# Patient Record
Sex: MALE | Race: WHITE | Employment: UNEMPLOYED | URBAN - NONMETROPOLITAN AREA
[De-identification: names, ages, dates, MRNs, and addresses within clinical notes are randomized per-mention and may not be internally consistent; named-entity substitution may affect disease eponyms.]

---

## 2017-02-09 RX ORDER — FLUTICASONE PROPIONATE 50 MCG
2 SPRAY, SUSPENSION (ML) NASAL DAILY
Qty: 16 G | Refills: 1 | Status: SHIPPED | OUTPATIENT
Start: 2017-02-09 | End: 2017-03-02 | Stop reason: SDUPTHER

## 2017-02-17 RX ORDER — IBUPROFEN 800 MG/1
800 TABLET ORAL EVERY 6 HOURS PRN
Qty: 120 TABLET | Refills: 0 | Status: SHIPPED | OUTPATIENT
Start: 2017-02-17 | End: 2017-03-22 | Stop reason: SDUPTHER

## 2017-02-21 ENCOUNTER — OFFICE VISIT (OUTPATIENT)
Dept: PRIMARY CARE CLINIC | Age: 52
End: 2017-02-21
Payer: MEDICAID

## 2017-02-21 VITALS
OXYGEN SATURATION: 97 % | HEIGHT: 71 IN | DIASTOLIC BLOOD PRESSURE: 68 MMHG | WEIGHT: 242 LBS | HEART RATE: 83 BPM | BODY MASS INDEX: 33.88 KG/M2 | SYSTOLIC BLOOD PRESSURE: 118 MMHG | TEMPERATURE: 97.3 F

## 2017-02-21 DIAGNOSIS — L73.9 FOLLICULITIS: Primary | ICD-10-CM

## 2017-02-21 PROCEDURE — 99213 OFFICE O/P EST LOW 20 MIN: CPT | Performed by: NURSE PRACTITIONER

## 2017-02-21 RX ORDER — HYDROCODONE BITARTRATE AND ACETAMINOPHEN 10; 325 MG/1; MG/1
1 TABLET ORAL EVERY 8 HOURS PRN
COMMUNITY
End: 2017-12-06

## 2017-02-21 RX ORDER — CEPHALEXIN 500 MG/1
500 CAPSULE ORAL 3 TIMES DAILY
Qty: 30 CAPSULE | Refills: 0 | Status: SHIPPED | OUTPATIENT
Start: 2017-02-21 | End: 2017-06-13

## 2017-02-21 ASSESSMENT — ENCOUNTER SYMPTOMS
BACK PAIN: 0
NAUSEA: 0
CONSTIPATION: 0
SHORTNESS OF BREATH: 0
COUGH: 0
SINUS PRESSURE: 0
DIARRHEA: 0
ABDOMINAL PAIN: 0
SORE THROAT: 0
CHOKING: 0
TROUBLE SWALLOWING: 0
VOICE CHANGE: 0
VOMITING: 0
ABDOMINAL DISTENTION: 0
WHEEZING: 0
CHEST TIGHTNESS: 0

## 2017-03-01 ENCOUNTER — OFFICE VISIT (OUTPATIENT)
Dept: PRIMARY CARE CLINIC | Age: 52
End: 2017-03-01
Payer: MEDICAID

## 2017-03-01 VITALS
WEIGHT: 232.25 LBS | HEIGHT: 70 IN | HEART RATE: 58 BPM | SYSTOLIC BLOOD PRESSURE: 130 MMHG | DIASTOLIC BLOOD PRESSURE: 82 MMHG | TEMPERATURE: 97.5 F | BODY MASS INDEX: 33.25 KG/M2 | OXYGEN SATURATION: 96 %

## 2017-03-01 DIAGNOSIS — L03.317 CELLULITIS OF BUTTOCK: Primary | ICD-10-CM

## 2017-03-01 PROCEDURE — 99213 OFFICE O/P EST LOW 20 MIN: CPT | Performed by: NURSE PRACTITIONER

## 2017-03-01 ASSESSMENT — ENCOUNTER SYMPTOMS
EYE DISCHARGE: 0
SORE THROAT: 0
COUGH: 0
SHORTNESS OF BREATH: 0
NAUSEA: 0
ABDOMINAL PAIN: 0
TROUBLE SWALLOWING: 0
VOMITING: 0

## 2017-03-02 DIAGNOSIS — L03.221 CELLULITIS OF NECK: ICD-10-CM

## 2017-03-03 ENCOUNTER — TELEPHONE (OUTPATIENT)
Dept: PRIMARY CARE CLINIC | Age: 52
End: 2017-03-03

## 2017-03-03 DIAGNOSIS — L03.221 CELLULITIS OF NECK: ICD-10-CM

## 2017-03-03 LAB
MRSA CULTURE ONLY: ABNORMAL
ORGANISM: ABNORMAL

## 2017-03-03 RX ORDER — FLUTICASONE PROPIONATE 50 MCG
2 SPRAY, SUSPENSION (ML) NASAL DAILY
Qty: 16 G | Refills: 1 | Status: SHIPPED | OUTPATIENT
Start: 2017-03-03 | End: 2017-06-07 | Stop reason: SDUPTHER

## 2017-03-06 RX ORDER — HYDROXYZINE HYDROCHLORIDE 25 MG/1
TABLET, FILM COATED ORAL
Qty: 60 TABLET | Refills: 0 | Status: SHIPPED | OUTPATIENT
Start: 2017-03-06 | End: 2017-12-06

## 2017-03-22 RX ORDER — IBUPROFEN 800 MG/1
TABLET ORAL
Qty: 120 TABLET | Refills: 0 | Status: SHIPPED | OUTPATIENT
Start: 2017-03-22 | End: 2017-04-24 | Stop reason: SDUPTHER

## 2017-04-24 RX ORDER — IBUPROFEN 800 MG/1
800 TABLET ORAL EVERY 6 HOURS PRN
Qty: 120 TABLET | Refills: 0 | Status: SHIPPED | OUTPATIENT
Start: 2017-04-24 | End: 2017-10-19 | Stop reason: SDUPTHER
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
## 2017-05-22 DIAGNOSIS — L30.9 DERMATITIS: ICD-10-CM

## 2017-05-22 RX ORDER — CETIRIZINE HYDROCHLORIDE 10 MG/1
10 TABLET ORAL DAILY
Qty: 30 TABLET | Refills: 5 | Status: SHIPPED | OUTPATIENT
Start: 2017-05-22 | End: 2018-01-05 | Stop reason: SDUPTHER

## 2017-06-08 RX ORDER — FLUTICASONE PROPIONATE 50 MCG
2 SPRAY, SUSPENSION (ML) NASAL DAILY
Qty: 1 BOTTLE | Refills: 5 | Status: SHIPPED | OUTPATIENT
Start: 2017-06-08 | End: 2018-07-30 | Stop reason: SDUPTHER

## 2017-06-13 ENCOUNTER — OFFICE VISIT (OUTPATIENT)
Dept: PRIMARY CARE CLINIC | Age: 52
End: 2017-06-13
Payer: MEDICAID

## 2017-06-13 VITALS
WEIGHT: 229 LBS | OXYGEN SATURATION: 95 % | HEIGHT: 72 IN | SYSTOLIC BLOOD PRESSURE: 138 MMHG | TEMPERATURE: 98.9 F | DIASTOLIC BLOOD PRESSURE: 80 MMHG | HEART RATE: 104 BPM | BODY MASS INDEX: 31.02 KG/M2

## 2017-06-13 DIAGNOSIS — I10 ESSENTIAL HYPERTENSION: Primary | ICD-10-CM

## 2017-06-13 DIAGNOSIS — Z12.11 COLON CANCER SCREENING: ICD-10-CM

## 2017-06-13 DIAGNOSIS — T56.0X1S LEAD-INDUCED GOUT, UNSPECIFIED CHRONICITY, UNSPECIFIED SITE, SEQUELA: ICD-10-CM

## 2017-06-13 DIAGNOSIS — M10.10 LEAD-INDUCED GOUT, UNSPECIFIED CHRONICITY, UNSPECIFIED SITE, SEQUELA: ICD-10-CM

## 2017-06-13 PROCEDURE — 99213 OFFICE O/P EST LOW 20 MIN: CPT | Performed by: NURSE PRACTITIONER

## 2017-06-13 RX ORDER — LISINOPRIL 40 MG/1
40 TABLET ORAL DAILY
Qty: 30 TABLET | Refills: 11 | Status: SHIPPED | OUTPATIENT
Start: 2017-06-13 | End: 2017-12-06

## 2017-06-13 RX ORDER — METOPROLOL TARTRATE 100 MG/1
100 TABLET ORAL 2 TIMES DAILY
Qty: 60 TABLET | Refills: 11 | Status: CANCELLED | OUTPATIENT
Start: 2017-06-13

## 2017-06-13 RX ORDER — ALLOPURINOL 300 MG/1
300 TABLET ORAL DAILY
Qty: 30 TABLET | Refills: 5 | Status: SHIPPED | OUTPATIENT
Start: 2017-06-13 | End: 2018-09-17

## 2017-06-13 RX ORDER — HYDROCHLOROTHIAZIDE 25 MG/1
25 TABLET ORAL DAILY
Qty: 30 TABLET | Refills: 11 | Status: SHIPPED | OUTPATIENT
Start: 2017-06-13 | End: 2017-08-23 | Stop reason: ALTCHOICE

## 2017-06-13 ASSESSMENT — ENCOUNTER SYMPTOMS
COUGH: 0
TROUBLE SWALLOWING: 0
ABDOMINAL PAIN: 0
SHORTNESS OF BREATH: 0
NAUSEA: 0
CONSTIPATION: 0
DIARRHEA: 0
VOMITING: 0
SORE THROAT: 0
RHINORRHEA: 0

## 2017-06-26 ENCOUNTER — TELEPHONE (OUTPATIENT)
Dept: GASTROENTEROLOGY | Age: 52
End: 2017-06-26

## 2017-08-23 ENCOUNTER — OFFICE VISIT (OUTPATIENT)
Dept: PRIMARY CARE CLINIC | Age: 52
End: 2017-08-23
Payer: MEDICAID

## 2017-08-23 ENCOUNTER — TELEPHONE (OUTPATIENT)
Dept: PRIMARY CARE CLINIC | Age: 52
End: 2017-08-23

## 2017-08-23 VITALS
DIASTOLIC BLOOD PRESSURE: 96 MMHG | BODY MASS INDEX: 28.99 KG/M2 | HEART RATE: 85 BPM | SYSTOLIC BLOOD PRESSURE: 144 MMHG | OXYGEN SATURATION: 98 % | HEIGHT: 72 IN | WEIGHT: 214 LBS | TEMPERATURE: 98 F

## 2017-08-23 DIAGNOSIS — I10 ESSENTIAL HYPERTENSION: ICD-10-CM

## 2017-08-23 DIAGNOSIS — N17.9 ACUTE RENAL FAILURE, UNSPECIFIED ACUTE RENAL FAILURE TYPE (HCC): Primary | ICD-10-CM

## 2017-08-23 DIAGNOSIS — F41.9 ANXIETY: ICD-10-CM

## 2017-08-23 DIAGNOSIS — N17.9 ACUTE RENAL FAILURE, UNSPECIFIED ACUTE RENAL FAILURE TYPE (HCC): ICD-10-CM

## 2017-08-23 DIAGNOSIS — T67.1XXD HEAT SYNCOPE, SUBSEQUENT ENCOUNTER: ICD-10-CM

## 2017-08-23 LAB
ALBUMIN SERPL-MCNC: 3.7 G/DL (ref 3.5–5.2)
ALP BLD-CCNC: 67 U/L (ref 40–130)
ALT SERPL-CCNC: 10 U/L (ref 5–41)
ANION GAP SERPL CALCULATED.3IONS-SCNC: 16 MMOL/L (ref 7–19)
AST SERPL-CCNC: 16 U/L (ref 5–40)
BASOPHILS ABSOLUTE: 0 K/UL (ref 0–0.2)
BASOPHILS RELATIVE PERCENT: 0.4 % (ref 0–1)
BILIRUB SERPL-MCNC: 0.4 MG/DL (ref 0.2–1.2)
BUN BLDV-MCNC: 14 MG/DL (ref 6–20)
CALCIUM SERPL-MCNC: 8.8 MG/DL (ref 8.6–10)
CHLORIDE BLD-SCNC: 107 MMOL/L (ref 98–111)
CO2: 21 MMOL/L (ref 22–29)
CREAT SERPL-MCNC: 0.8 MG/DL (ref 0.5–1.2)
EOSINOPHILS ABSOLUTE: 0.2 K/UL (ref 0–0.6)
EOSINOPHILS RELATIVE PERCENT: 2.1 % (ref 0–5)
GFR NON-AFRICAN AMERICAN: >60
GLUCOSE BLD-MCNC: 91 MG/DL (ref 74–109)
HCT VFR BLD CALC: 38.4 % (ref 42–52)
HEMOGLOBIN: 12.7 G/DL (ref 14–18)
LYMPHOCYTES ABSOLUTE: 2.8 K/UL (ref 1.1–4.5)
LYMPHOCYTES RELATIVE PERCENT: 27.9 % (ref 20–40)
MCH RBC QN AUTO: 31.8 PG (ref 27–31)
MCHC RBC AUTO-ENTMCNC: 33.1 G/DL (ref 33–37)
MCV RBC AUTO: 96 FL (ref 80–94)
MONOCYTES ABSOLUTE: 0.7 K/UL (ref 0–0.9)
MONOCYTES RELATIVE PERCENT: 7.2 % (ref 0–10)
NEUTROPHILS ABSOLUTE: 6.2 K/UL (ref 1.5–7.5)
NEUTROPHILS RELATIVE PERCENT: 62 % (ref 50–65)
PDW BLD-RTO: 13.4 % (ref 11.5–14.5)
PLATELET # BLD: 217 K/UL (ref 130–400)
PMV BLD AUTO: 10.4 FL (ref 9.4–12.4)
POTASSIUM SERPL-SCNC: 4 MMOL/L (ref 3.5–5)
RBC # BLD: 4 M/UL (ref 4.7–6.1)
SODIUM BLD-SCNC: 144 MMOL/L (ref 136–145)
TOTAL PROTEIN: 7.1 G/DL (ref 6.6–8.7)
WBC # BLD: 10.1 K/UL (ref 4.8–10.8)

## 2017-08-23 PROCEDURE — 99214 OFFICE O/P EST MOD 30 MIN: CPT | Performed by: NURSE PRACTITIONER

## 2017-08-23 RX ORDER — BUSPIRONE HYDROCHLORIDE 7.5 MG/1
7.5 TABLET ORAL 2 TIMES DAILY
Qty: 60 TABLET | Refills: 1 | Status: SHIPPED | OUTPATIENT
Start: 2017-08-23 | End: 2017-11-14 | Stop reason: SDUPTHER

## 2017-08-23 RX ORDER — AMLODIPINE BESYLATE 5 MG/1
5 TABLET ORAL DAILY
Qty: 30 TABLET | Refills: 11 | Status: SHIPPED | OUTPATIENT
Start: 2017-08-23 | End: 2018-07-30 | Stop reason: SDUPTHER

## 2017-08-23 ASSESSMENT — ENCOUNTER SYMPTOMS
COUGH: 0
RHINORRHEA: 0
CONSTIPATION: 0
SORE THROAT: 0
DIARRHEA: 0
NAUSEA: 0
SHORTNESS OF BREATH: 0
TROUBLE SWALLOWING: 0
ABDOMINAL PAIN: 0
VOMITING: 0

## 2017-08-24 ENCOUNTER — TELEPHONE (OUTPATIENT)
Dept: PRIMARY CARE CLINIC | Age: 52
End: 2017-08-24

## 2017-10-20 RX ORDER — IBUPROFEN 800 MG/1
800 TABLET ORAL EVERY 6 HOURS PRN
Qty: 120 TABLET | Refills: 0 | Status: SHIPPED | OUTPATIENT
Start: 2017-10-20 | End: 2017-11-25 | Stop reason: SDUPTHER

## 2017-10-26 ENCOUNTER — OFFICE VISIT (OUTPATIENT)
Dept: PRIMARY CARE CLINIC | Age: 52
End: 2017-10-26
Payer: MEDICAID

## 2017-10-26 VITALS
DIASTOLIC BLOOD PRESSURE: 88 MMHG | BODY MASS INDEX: 29.32 KG/M2 | TEMPERATURE: 99 F | HEIGHT: 72 IN | WEIGHT: 216.5 LBS | HEART RATE: 83 BPM | OXYGEN SATURATION: 96 % | SYSTOLIC BLOOD PRESSURE: 132 MMHG

## 2017-10-26 DIAGNOSIS — R45.4 IRRITABILITY AND ANGER: Primary | ICD-10-CM

## 2017-10-26 PROCEDURE — 99213 OFFICE O/P EST LOW 20 MIN: CPT | Performed by: NURSE PRACTITIONER

## 2017-10-26 PROCEDURE — G8417 CALC BMI ABV UP PARAM F/U: HCPCS | Performed by: NURSE PRACTITIONER

## 2017-10-26 PROCEDURE — G8427 DOCREV CUR MEDS BY ELIG CLIN: HCPCS | Performed by: NURSE PRACTITIONER

## 2017-10-26 PROCEDURE — 3017F COLORECTAL CA SCREEN DOC REV: CPT | Performed by: NURSE PRACTITIONER

## 2017-10-26 PROCEDURE — G8484 FLU IMMUNIZE NO ADMIN: HCPCS | Performed by: NURSE PRACTITIONER

## 2017-10-26 PROCEDURE — 4004F PT TOBACCO SCREEN RCVD TLK: CPT | Performed by: NURSE PRACTITIONER

## 2017-10-26 RX ORDER — ARIPIPRAZOLE 5 MG/1
5 TABLET ORAL DAILY
Qty: 30 TABLET | Refills: 1 | Status: SHIPPED | OUTPATIENT
Start: 2017-10-26 | End: 2017-12-14 | Stop reason: SDUPTHER

## 2017-10-26 ASSESSMENT — ENCOUNTER SYMPTOMS
CONSTIPATION: 0
SHORTNESS OF BREATH: 0
TROUBLE SWALLOWING: 0
SORE THROAT: 0
COUGH: 0
ABDOMINAL PAIN: 0
VOMITING: 0
NAUSEA: 0
DIARRHEA: 0
RHINORRHEA: 0

## 2017-10-26 NOTE — PROGRESS NOTES
(ADVIL;MOTRIN) 800 MG tablet Take 1 tablet by mouth every 6 hours as needed for Pain Yes CHUCKIE Barnes DO   amLODIPine (NORVASC) 5 MG tablet Take 1 tablet by mouth daily Yes CONSTANTINO Rider   busPIRone (BUSPAR) 7.5 MG tablet Take 1 tablet by mouth 2 times daily Yes CONSTANTINO Rider   lisinopril (PRINIVIL;ZESTRIL) 40 MG tablet Take 1 tablet by mouth daily Yes CONSTANTINO Rider   allopurinol (ZYLOPRIM) 300 MG tablet Take 1 tablet by mouth daily Yes CONSTANTINO Rider   fluticasone (FLONASE) 50 MCG/ACT nasal spray 2 sprays by Nasal route daily Yes CONSTANTINO Louie   cetirizine (ZYRTEC) 10 MG tablet Take 1 tablet by mouth daily Yes CONSTANTINO Spaulding   hydrOXYzine (ATARAX) 25 MG tablet TAKE 1 TO 2 TABLETS BY MOUTH AS NEEDED FOR SLEEP Yes CHUCKIE Barnes DO   HYDROcodone-acetaminophen (NORCO)  MG per tablet Take 1 tablet by mouth every 8 hours as needed for Pain . Yes Historical Provider, MD   FLUoxetine (PROZAC) 40 MG capsule Take 1 capsule by mouth daily Yes CONSTANTINO Rider   baclofen (LIORESAL) 10 MG tablet Take 10 mg by mouth 3 times daily  Yes Historical Provider, MD   gabapentin (NEURONTIN) 600 MG tablet 600 mg 3 times daily  Yes Historical Provider, MD       Allergies: Review of patient's allergies indicates no known allergies. Past Medical History:   Diagnosis Date    Chronic back pain     Chronic neck pain     Gout     Hypertension        No past surgical history on file. Social History   Substance Use Topics    Smoking status: Current Every Day Smoker     Packs/day: 0.50     Types: Cigarettes    Smokeless tobacco: Never Used      Comment: trying to quit    Alcohol use Yes      Comment: 2 days a week        Review of Systems   Constitutional: Negative for activity change, appetite change, fatigue, fever and unexpected weight change.    HENT: Negative for ear pain, rhinorrhea, sore throat and trouble

## 2017-10-26 NOTE — TELEPHONE ENCOUNTER
[facility] called, states pt thought he was getting an increase in his prozac and another rx other than abilify as well today.

## 2017-11-14 DIAGNOSIS — N17.9 ACUTE RENAL FAILURE, UNSPECIFIED ACUTE RENAL FAILURE TYPE (HCC): ICD-10-CM

## 2017-11-14 RX ORDER — BUSPIRONE HYDROCHLORIDE 7.5 MG/1
7.5 TABLET ORAL 2 TIMES DAILY
Qty: 60 TABLET | Refills: 11 | Status: SHIPPED | OUTPATIENT
Start: 2017-11-14 | End: 2018-07-30 | Stop reason: SDUPTHER

## 2017-11-14 NOTE — TELEPHONE ENCOUNTER
Pt last seen 10/26/17      Requested Prescriptions     Signed Prescriptions Disp Refills    busPIRone (BUSPAR) 7.5 MG tablet 60 tablet 11     Sig: Take 1 tablet by mouth 2 times daily     Authorizing Provider: Misbah Rousseua     Ordering User: JAMES SANTIAGO

## 2017-11-27 RX ORDER — IBUPROFEN 800 MG/1
800 TABLET ORAL EVERY 6 HOURS PRN
Qty: 120 TABLET | Refills: 0 | Status: SHIPPED | OUTPATIENT
Start: 2017-11-27 | End: 2018-08-03 | Stop reason: SDUPTHER

## 2017-12-06 ENCOUNTER — OFFICE VISIT (OUTPATIENT)
Dept: PRIMARY CARE CLINIC | Age: 52
End: 2017-12-06
Payer: MEDICAID

## 2017-12-06 VITALS
SYSTOLIC BLOOD PRESSURE: 138 MMHG | OXYGEN SATURATION: 97 % | HEART RATE: 91 BPM | DIASTOLIC BLOOD PRESSURE: 86 MMHG | WEIGHT: 218.75 LBS | HEIGHT: 72 IN | BODY MASS INDEX: 29.63 KG/M2 | TEMPERATURE: 97.5 F

## 2017-12-06 DIAGNOSIS — F41.1 GAD (GENERALIZED ANXIETY DISORDER): Primary | ICD-10-CM

## 2017-12-06 PROCEDURE — G8417 CALC BMI ABV UP PARAM F/U: HCPCS | Performed by: NURSE PRACTITIONER

## 2017-12-06 PROCEDURE — 99213 OFFICE O/P EST LOW 20 MIN: CPT | Performed by: NURSE PRACTITIONER

## 2017-12-06 PROCEDURE — 3017F COLORECTAL CA SCREEN DOC REV: CPT | Performed by: NURSE PRACTITIONER

## 2017-12-06 PROCEDURE — G8427 DOCREV CUR MEDS BY ELIG CLIN: HCPCS | Performed by: NURSE PRACTITIONER

## 2017-12-06 PROCEDURE — G8484 FLU IMMUNIZE NO ADMIN: HCPCS | Performed by: NURSE PRACTITIONER

## 2017-12-06 PROCEDURE — 4004F PT TOBACCO SCREEN RCVD TLK: CPT | Performed by: NURSE PRACTITIONER

## 2017-12-06 RX ORDER — DULOXETIN HYDROCHLORIDE 60 MG/1
60 CAPSULE, DELAYED RELEASE ORAL DAILY
Qty: 30 CAPSULE | Refills: 3 | Status: SHIPPED | OUTPATIENT
Start: 2017-12-06 | End: 2018-07-30 | Stop reason: SDUPTHER

## 2017-12-06 RX ORDER — OXYCODONE AND ACETAMINOPHEN 7.5; 325 MG/1; MG/1
1 TABLET ORAL 3 TIMES DAILY PRN
Refills: 0 | COMMUNITY
Start: 2017-11-22 | End: 2018-11-21

## 2017-12-06 ASSESSMENT — ENCOUNTER SYMPTOMS
COUGH: 0
TROUBLE SWALLOWING: 0
RHINORRHEA: 0
DIARRHEA: 0
CONSTIPATION: 0
ABDOMINAL PAIN: 0
SHORTNESS OF BREATH: 0
VOMITING: 0
SORE THROAT: 0
NAUSEA: 0

## 2017-12-06 NOTE — PROGRESS NOTES
[address]  [place], [address]  Phone [phone]   Fax [phone]      OFFICE VISIT: 2017    Hermilo Angel- : 1965        Reason For Visit:  Madelin Ramey is a 47 y.o. male who is here for Follow-up (medications do not seem to be helping. feels more nervous and anxious) and Hypertension (blood pressure has been running ok. hasnt taken lisinopril in a couple months. 137-70)         HPI    Pt is here for f/u on anxiety  Started abilify last visit. Pt doesn't feel like it is helping. He states he still feels nervous and anxious. Has been to counseling at [facility] - has been 4-5 years ago. Get around people get anxiety  Dr Dusty Chamberlain in the past put me on valium and it really helped. HTN  On lisinopril and norvasc  He states he hasn't been taking his lisinopril for a couple of months. Blood pressure has been running good. height is 6' (1.829 m) and weight is 218 lb 12 oz (99.2 kg). His temporal temperature is 97.5 °F (36.4 °C). His blood pressure is 138/86 and his pulse is 91. His oxygen saturation is 97%. Body mass index is 29.67 kg/m².     Results for orders placed or performed in visit on 17   CBC Auto Differential   Result Value Ref Range    WBC 10.1 4.8 - 10.8 K/uL    RBC 4.00 (L) 4.70 - 6.10 M/uL    Hemoglobin 12.7 (L) 14.0 - 18.0 g/dL    Hematocrit 38.4 (L) 42.0 - 52.0 %    MCV 96.0 (H) 80.0 - 94.0 fL    MCH 31.8 (H) 27.0 - 31.0 pg    MCHC 33.1 33.0 - 37.0 g/dL    RDW 13.4 11.5 - 14.5 %    Platelets 736 726 - 194 K/uL    MPV 10.4 9.4 - 12.4 fL    Neutrophils % 62.0 50.0 - 65.0 %    Lymphocytes % 27.9 20.0 - 40.0 %    Monocytes % 7.2 0.0 - 10.0 %    Eosinophils % 2.1 0.0 - 5.0 %    Basophils % 0.4 0.0 - 1.0 %    Neutrophils # 6.2 1.5 - 7.5 K/uL    Lymphocytes # 2.8 1.1 - 4.5 K/uL    Monocytes # 0.70 0.00 - 0.90 K/uL    Eosinophils # 0.20 0.00 - 0.60 K/uL    Basophils # 0.00 0.00 - 0.20 K/uL   Comprehensive Metabolic Panel   Result Value Ref Range    Sodium 144 136 - 145 nausea and vomiting. Genitourinary: Negative for flank pain. Musculoskeletal: Negative for arthralgias, myalgias, neck pain and neck stiffness. Neurological: Negative for headaches. Psychiatric/Behavioral: Negative for decreased concentration and sleep disturbance. The patient is nervous/anxious. Physical Exam   Constitutional: He is oriented to person, place, and time. He appears well-developed and well-nourished. HENT:   Head: Normocephalic and atraumatic. Eyes: No scleral icterus. Neck: Normal range of motion. Neck supple. No edema present. Cardiovascular: Normal rate, regular rhythm, normal heart sounds and intact distal pulses. No murmur heard. Pulmonary/Chest: Effort normal and breath sounds normal.   Abdominal: Soft. Normal appearance and bowel sounds are normal. He exhibits no distension. There is no hepatosplenomegaly. There is no tenderness. Musculoskeletal: Normal range of motion. He exhibits no edema. Neurological: He is alert and oriented to person, place, and time. Skin: Skin is warm, dry and intact. No rash noted. Psychiatric: He has a normal mood and affect. His speech is normal and behavior is normal. Judgment and thought content normal.   Vitals reviewed. ASSESSMENT      ICD-10-CM ICD-9-CM    1. MAI (generalized anxiety disorder) F41.1 300.02 DULoxetine (CYMBALTA) 60 MG extended release capsule      [facility] - [address]  1. MAI (generalized anxiety disorder)  D/c prozac  Starting on cymbalta  Going to have him see [facility] for counseling. I discussed that with him being on gabapentin and percocet would like to avoid benzos. - DULoxetine (CYMBALTA) 60 MG extended release capsule; Take 1 capsule by mouth daily  Dispense: 30 capsule;  Refill: 3  - [facility] - Chad FLOREZ      Orders Placed This Encounter   Procedures    [facility] - [address]        Return

## 2017-12-14 DIAGNOSIS — R45.4 IRRITABILITY AND ANGER: ICD-10-CM

## 2017-12-14 RX ORDER — ARIPIPRAZOLE 5 MG/1
5 TABLET ORAL DAILY
Qty: 30 TABLET | Refills: 1 | Status: SHIPPED | OUTPATIENT
Start: 2017-12-14 | End: 2018-07-30 | Stop reason: SDUPTHER
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
## 2018-01-05 DIAGNOSIS — L30.9 DERMATITIS: ICD-10-CM

## 2018-01-05 RX ORDER — CETIRIZINE HYDROCHLORIDE 10 MG/1
10 TABLET ORAL DAILY
Qty: 30 TABLET | Refills: 11 | Status: SHIPPED | OUTPATIENT
Start: 2018-01-05 | End: 2018-07-30 | Stop reason: SDUPTHER

## 2018-07-30 ENCOUNTER — OFFICE VISIT (OUTPATIENT)
Dept: PRIMARY CARE CLINIC | Age: 53
End: 2018-07-30
Payer: MEDICAID

## 2018-07-30 VITALS
OXYGEN SATURATION: 96 % | HEART RATE: 94 BPM | WEIGHT: 197 LBS | SYSTOLIC BLOOD PRESSURE: 154 MMHG | BODY MASS INDEX: 26.68 KG/M2 | TEMPERATURE: 97.6 F | DIASTOLIC BLOOD PRESSURE: 96 MMHG | HEIGHT: 72 IN

## 2018-07-30 DIAGNOSIS — B35.4 TINEA CORPORIS: ICD-10-CM

## 2018-07-30 DIAGNOSIS — F33.1 MODERATE EPISODE OF RECURRENT MAJOR DEPRESSIVE DISORDER (HCC): Primary | ICD-10-CM

## 2018-07-30 DIAGNOSIS — Z13.31 POSITIVE DEPRESSION SCREENING: ICD-10-CM

## 2018-07-30 DIAGNOSIS — J30.9 ALLERGIC RHINITIS, UNSPECIFIED CHRONICITY, UNSPECIFIED SEASONALITY, UNSPECIFIED TRIGGER: ICD-10-CM

## 2018-07-30 DIAGNOSIS — Z00.00 ROUTINE PHYSICAL EXAMINATION: ICD-10-CM

## 2018-07-30 DIAGNOSIS — I10 ESSENTIAL HYPERTENSION: ICD-10-CM

## 2018-07-30 DIAGNOSIS — F41.1 GAD (GENERALIZED ANXIETY DISORDER): ICD-10-CM

## 2018-07-30 PROCEDURE — G0444 DEPRESSION SCREEN ANNUAL: HCPCS | Performed by: NURSE PRACTITIONER

## 2018-07-30 PROCEDURE — 3017F COLORECTAL CA SCREEN DOC REV: CPT | Performed by: NURSE PRACTITIONER

## 2018-07-30 PROCEDURE — 4004F PT TOBACCO SCREEN RCVD TLK: CPT | Performed by: NURSE PRACTITIONER

## 2018-07-30 PROCEDURE — 99214 OFFICE O/P EST MOD 30 MIN: CPT | Performed by: NURSE PRACTITIONER

## 2018-07-30 PROCEDURE — G8431 POS CLIN DEPRES SCRN F/U DOC: HCPCS | Performed by: NURSE PRACTITIONER

## 2018-07-30 PROCEDURE — G8417 CALC BMI ABV UP PARAM F/U: HCPCS | Performed by: NURSE PRACTITIONER

## 2018-07-30 PROCEDURE — G8427 DOCREV CUR MEDS BY ELIG CLIN: HCPCS | Performed by: NURSE PRACTITIONER

## 2018-07-30 RX ORDER — DULOXETIN HYDROCHLORIDE 60 MG/1
60 CAPSULE, DELAYED RELEASE ORAL DAILY
Qty: 30 CAPSULE | Refills: 11 | Status: SHIPPED | OUTPATIENT
Start: 2018-07-30 | End: 2019-12-13

## 2018-07-30 RX ORDER — LISINOPRIL 40 MG/1
40 TABLET ORAL DAILY
Qty: 30 TABLET | Refills: 11 | Status: SHIPPED | OUTPATIENT
Start: 2018-07-30 | End: 2019-09-20 | Stop reason: SDUPTHER

## 2018-07-30 RX ORDER — KETOCONAZOLE 20 MG/G
CREAM TOPICAL 2 TIMES DAILY
Qty: 60 G | Refills: 0 | Status: SHIPPED | OUTPATIENT
Start: 2018-07-30 | End: 2018-11-21

## 2018-07-30 RX ORDER — ARIPIPRAZOLE 5 MG/1
5 TABLET ORAL DAILY
Qty: 30 TABLET | Refills: 11 | Status: SHIPPED | OUTPATIENT
Start: 2018-07-30 | End: 2019-12-13

## 2018-07-30 RX ORDER — LISINOPRIL 40 MG/1
40 TABLET ORAL DAILY
COMMUNITY
End: 2018-07-30 | Stop reason: SDUPTHER

## 2018-07-30 RX ORDER — CETIRIZINE HYDROCHLORIDE 10 MG/1
10 TABLET ORAL DAILY
Qty: 30 TABLET | Refills: 11 | Status: SHIPPED | OUTPATIENT
Start: 2018-07-30 | End: 2018-08-06 | Stop reason: ALTCHOICE

## 2018-07-30 RX ORDER — FLUTICASONE PROPIONATE 50 MCG
2 SPRAY, SUSPENSION (ML) NASAL DAILY
Qty: 1 BOTTLE | Refills: 5 | Status: SHIPPED | OUTPATIENT
Start: 2018-07-30 | End: 2019-12-13

## 2018-07-30 RX ORDER — BUSPIRONE HYDROCHLORIDE 7.5 MG/1
7.5 TABLET ORAL 2 TIMES DAILY
Qty: 60 TABLET | Refills: 11 | Status: SHIPPED | OUTPATIENT
Start: 2018-07-30 | End: 2019-03-27 | Stop reason: SDUPTHER

## 2018-07-30 RX ORDER — AMLODIPINE BESYLATE 5 MG/1
5 TABLET ORAL DAILY
Qty: 30 TABLET | Refills: 11 | Status: SHIPPED | OUTPATIENT
Start: 2018-07-30 | End: 2019-09-20 | Stop reason: SDUPTHER

## 2018-07-30 ASSESSMENT — PATIENT HEALTH QUESTIONNAIRE - PHQ9
3. TROUBLE FALLING OR STAYING ASLEEP: 2
SUM OF ALL RESPONSES TO PHQ9 QUESTIONS 1 & 2: 4
9. THOUGHTS THAT YOU WOULD BE BETTER OFF DEAD, OR OF HURTING YOURSELF: 0
5. POOR APPETITE OR OVEREATING: 0
4. FEELING TIRED OR HAVING LITTLE ENERGY: 3
8. MOVING OR SPEAKING SO SLOWLY THAT OTHER PEOPLE COULD HAVE NOTICED. OR THE OPPOSITE, BEING SO FIGETY OR RESTLESS THAT YOU HAVE BEEN MOVING AROUND A LOT MORE THAN USUAL: 2
2. FEELING DOWN, DEPRESSED OR HOPELESS: 3
6. FEELING BAD ABOUT YOURSELF - OR THAT YOU ARE A FAILURE OR HAVE LET YOURSELF OR YOUR FAMILY DOWN: 3
SUM OF ALL RESPONSES TO PHQ QUESTIONS 1-9: 17
10. IF YOU CHECKED OFF ANY PROBLEMS, HOW DIFFICULT HAVE THESE PROBLEMS MADE IT FOR YOU TO DO YOUR WORK, TAKE CARE OF THINGS AT HOME, OR GET ALONG WITH OTHER PEOPLE: 2
1. LITTLE INTEREST OR PLEASURE IN DOING THINGS: 1
7. TROUBLE CONCENTRATING ON THINGS, SUCH AS READING THE NEWSPAPER OR WATCHING TELEVISION: 3

## 2018-07-30 ASSESSMENT — ENCOUNTER SYMPTOMS
VOMITING: 0
SHORTNESS OF BREATH: 0
COUGH: 0
ABDOMINAL PAIN: 0
NAUSEA: 0
DIARRHEA: 0
CONSTIPATION: 0
RHINORRHEA: 0
TROUBLE SWALLOWING: 0
SORE THROAT: 0

## 2018-07-30 NOTE — PROGRESS NOTES
11    amLODIPine (NORVASC) 5 MG tablet Take 1 tablet by mouth daily 30 tablet 11    fluticasone (FLONASE) 50 MCG/ACT nasal spray 2 sprays by Nasal route daily 1 Bottle 5    ketoconazole (NIZORAL) 2 % cream Apply topically 2 times daily 60 g 0    allopurinol (ZYLOPRIM) 300 MG tablet Take 1 tablet by mouth daily 30 tablet 5    oxyCODONE-acetaminophen (PERCOCET) 7.5-325 MG per tablet Take 1 tablet by mouth 3 times daily as needed . 0    ibuprofen (ADVIL;MOTRIN) 800 MG tablet Take 1 tablet by mouth every 6 hours as needed for Pain 120 tablet 0    baclofen (LIORESAL) 10 MG tablet Take 10 mg by mouth 3 times daily       gabapentin (NEURONTIN) 600 MG tablet 600 mg 3 times daily        No current facility-administered medications for this visit. No Known Allergies    Health Maintenance   Topic Date Due    Potassium monitoring  1965    Creatinine monitoring  1965    HIV screen  08/17/1981    DTaP/Tdap/Td vaccine (1 - Tdap) 08/17/1985    Pneumococcal med risk (1 of 1 - PPSV23) 08/17/1985    Shingles Vaccine (1 of 2 - 2 Dose Series) 08/17/2016    Colon cancer screen colonoscopy  08/17/2016    Flu vaccine (1) 09/01/2018    Lipid screen  12/20/2021        Subjective:      Review of Systems   Constitutional: Negative for activity change, appetite change, fatigue, fever and unexpected weight change. HENT: Negative for ear pain, rhinorrhea, sore throat and trouble swallowing. Eyes: Negative for visual disturbance. Respiratory: Negative for cough and shortness of breath. Cardiovascular: Negative for chest pain, palpitations and leg swelling. Gastrointestinal: Negative for abdominal pain, constipation, diarrhea, nausea and vomiting. Genitourinary: Negative for flank pain. Musculoskeletal: Negative for arthralgias, myalgias, neck pain and neck stiffness. Neurological: Negative for headaches. Psychiatric/Behavioral: Positive for decreased concentration and dysphoric mood.  Negative for sleep disturbance and suicidal ideas. The patient is nervous/anxious. Objective:     Physical Exam   Constitutional: He is oriented to person, place, and time. He appears well-developed and well-nourished. HENT:   Head: Normocephalic and atraumatic. Right Ear: External ear normal.   Left Ear: External ear normal.   Nose: Nose normal.   Mouth/Throat: Oropharynx is clear and moist.   Eyes: Conjunctivae are normal. Pupils are equal, round, and reactive to light. No scleral icterus. Neck: Normal range of motion. Neck supple. No edema present. Cardiovascular: Normal rate, regular rhythm, normal heart sounds and intact distal pulses. No murmur heard. Pulmonary/Chest: Effort normal and breath sounds normal.   Abdominal: Soft. Normal appearance and bowel sounds are normal. He exhibits no distension. There is no hepatosplenomegaly. There is no tenderness. Musculoskeletal: Normal range of motion. He exhibits no edema. Hands:       Legs:  Neurological: He is alert and oriented to person, place, and time. Skin: Skin is warm, dry and intact. No rash noted. Psychiatric: He has a normal mood and affect. His speech is normal and behavior is normal. Judgment and thought content normal.   Vitals reviewed. BP (!) 154/96   Pulse 94   Temp 97.6 °F (36.4 °C) (Temporal)   Ht 6' (1.829 m)   Wt 197 lb (89.4 kg)   SpO2 96%   BMI 26.72 kg/m²     Assessment:        ICD-10-CM ICD-9-CM    1. Moderate episode of recurrent major depressive disorder (HCC) F33.1 296.32 DULoxetine (CYMBALTA) 60 MG extended release capsule      busPIRone (BUSPAR) 7.5 MG tablet   2. MAI (generalized anxiety disorder) F41.1 300.02 DULoxetine (CYMBALTA) 60 MG extended release capsule      busPIRone (BUSPAR) 7.5 MG tablet   3.  Essential hypertension I10 401.9 lisinopril (PRINIVIL;ZESTRIL) 40 MG tablet      amLODIPine (NORVASC) 5 MG tablet      CBC Auto Differential      Comprehensive Metabolic Panel      Lipid Panel Microalbumin / Creatinine Urine Ratio    Starting back on norvasc. Patient is asked to monitor BP at home or work, several times per month and return with written values at next office visit. 4. Tinea corporis B35.4 110.5 ketoconazole (NIZORAL) 2 % cream   5. Allergic rhinitis, unspecified chronicity, unspecified seasonality, unspecified trigger J30.9 477.9 cetirizine (ZYRTEC) 10 MG tablet      fluticasone (FLONASE) 50 MCG/ACT nasal spray   6. Routine physical examination Z00.00 V70.0 CBC Auto Differential      Comprehensive Metabolic Panel      Lipid Panel      TSH without Reflex      T4, Free      Microalbumin / Creatinine Urine Ratio   7. Positive depression screening Z13.89 796.4 Positive Screen for Clinical Depression with a Documented Follow-up Plan        Plan:      Return in about 6 weeks (around 9/10/2018) for mood follow up, high blood pressure.     Orders Placed This Encounter   Procedures    CBC Auto Differential     Standing Status:   Future     Standing Expiration Date:   7/31/2019    Comprehensive Metabolic Panel     Standing Status:   Future     Standing Expiration Date:   7/30/2019    Lipid Panel     Standing Status:   Future     Standing Expiration Date:   7/31/2019     Order Specific Question:   Is Patient Fasting?/# of Hours     Answer:   12 HOURS    TSH without Reflex     Standing Status:   Future     Standing Expiration Date:   7/31/2019    T4, Free     Standing Status:   Future     Standing Expiration Date:   7/31/2019    Microalbumin / Creatinine Urine Ratio     Standing Status:   Future     Standing Expiration Date:   7/31/2019    Positive Screen for Clinical Depression with a Documented Follow-up Plan      Orders Placed This Encounter   Medications    lisinopril (PRINIVIL;ZESTRIL) 40 MG tablet     Sig: Take 1 tablet by mouth daily     Dispense:  30 tablet     Refill:  11    cetirizine (ZYRTEC) 10 MG tablet     Sig: Take 1 tablet by mouth daily     Dispense:  30

## 2018-08-03 RX ORDER — IBUPROFEN 800 MG/1
800 TABLET ORAL EVERY 6 HOURS PRN
Qty: 120 TABLET | Refills: 0 | Status: SHIPPED | OUTPATIENT
Start: 2018-08-03 | End: 2018-09-17 | Stop reason: SDUPTHER

## 2018-08-03 NOTE — TELEPHONE ENCOUNTER
Pt seen 7/30/18 with a follow up on 9/10/18.       Requested Prescriptions     Pending Prescriptions Disp Refills    ibuprofen (ADVIL;MOTRIN) 800 MG tablet 120 tablet 0     Sig: Take 1 tablet by mouth every 6 hours as needed for Pain

## 2018-08-06 RX ORDER — LORATADINE 10 MG/1
10 TABLET ORAL DAILY
Qty: 30 TABLET | Refills: 11 | Status: SHIPPED | OUTPATIENT
Start: 2018-08-06 | End: 2019-12-13

## 2018-09-17 ENCOUNTER — OFFICE VISIT (OUTPATIENT)
Dept: PRIMARY CARE CLINIC | Age: 53
End: 2018-09-17
Payer: MEDICAID

## 2018-09-17 VITALS
HEART RATE: 94 BPM | WEIGHT: 185 LBS | HEIGHT: 72 IN | OXYGEN SATURATION: 94 % | DIASTOLIC BLOOD PRESSURE: 75 MMHG | SYSTOLIC BLOOD PRESSURE: 116 MMHG | BODY MASS INDEX: 25.06 KG/M2 | TEMPERATURE: 98.6 F

## 2018-09-17 DIAGNOSIS — Z00.00 ROUTINE PHYSICAL EXAMINATION: Primary | ICD-10-CM

## 2018-09-17 DIAGNOSIS — Z11.4 ENCOUNTER FOR SCREENING FOR HIV: ICD-10-CM

## 2018-09-17 DIAGNOSIS — M54.42 CHRONIC LEFT-SIDED LOW BACK PAIN WITH LEFT-SIDED SCIATICA: ICD-10-CM

## 2018-09-17 DIAGNOSIS — F41.1 GAD (GENERALIZED ANXIETY DISORDER): ICD-10-CM

## 2018-09-17 DIAGNOSIS — G89.29 CHRONIC LEFT-SIDED LOW BACK PAIN WITH LEFT-SIDED SCIATICA: ICD-10-CM

## 2018-09-17 DIAGNOSIS — Z12.11 SCREENING FOR COLON CANCER: ICD-10-CM

## 2018-09-17 PROCEDURE — 99396 PREV VISIT EST AGE 40-64: CPT | Performed by: NURSE PRACTITIONER

## 2018-09-17 RX ORDER — IBUPROFEN 800 MG/1
800 TABLET ORAL EVERY 6 HOURS PRN
Qty: 120 TABLET | Refills: 0 | Status: SHIPPED | OUTPATIENT
Start: 2018-09-17 | End: 2018-10-28 | Stop reason: SDUPTHER

## 2018-09-17 RX ORDER — M-VIT,TX,IRON,MINS/CALC/FOLIC 27MG-0.4MG
1 TABLET ORAL DAILY
COMMUNITY
End: 2019-12-13

## 2018-09-17 RX ORDER — CHLORAL HYDRATE 500 MG
1000 CAPSULE ORAL DAILY
COMMUNITY
End: 2019-12-13

## 2018-09-17 ASSESSMENT — ENCOUNTER SYMPTOMS
NAUSEA: 0
SHORTNESS OF BREATH: 0
VOMITING: 0
RHINORRHEA: 0
DIARRHEA: 0
COUGH: 0
SORE THROAT: 0
ABDOMINAL PAIN: 0
CONSTIPATION: 0
TROUBLE SWALLOWING: 0

## 2018-09-17 NOTE — PROGRESS NOTES
normal and behavior is normal. Judgment and thought content normal.   Vitals reviewed. /75 (Site: Left Upper Arm, Position: Sitting, Cuff Size: Large Adult)   Pulse 94   Temp 98.6 °F (37 °C) (Temporal)   Ht 5' 11.5\" (1.816 m)   Wt 185 lb (83.9 kg)   SpO2 94%   BMI 25.44 kg/m²     Assessment:        ICD-10-CM ICD-9-CM    1. Routine physical examination Z00.00 V70.0 PSA Screening      HIV Rapid 1&2      [facility]- Dwayne Elena MD   2. Encounter for screening for HIV Z11.4 V73.89 HIV Rapid 1&2   3. Chronic left-sided low back pain with left-sided sciatica M54.42 724.2 ibuprofen (ADVIL;MOTRIN) 800 MG tablet    G89.29 724.3      338.29    4. Screening for colon cancer Z12.11 V76.51 [facility]- Dwayne Elena MD   5. MAI (generalized anxiety disorder) F41.1 300.02 I explained to patient that I would not give him any benzos, if he needs them he will have to see psychiatry. Advised for him to see [address] [place] [address]. Plan:   Advised to check with health department or pharmacy for flu, pnuemonia, and shingles vaccines    Return in about 6 months (around 3/17/2019). Orders Placed This Encounter   Procedures    PSA Screening     Standing Status:   Future     Standing Expiration Date:   9/17/2019    HIV Rapid 1&2     Standing Status:   Future     Standing Expiration Date:   9/17/2019     Order Specific Question:   Specify Date & Time of Incident     Answer:   .    [facility]- Dwayne Eelna MD     Referral Priority:   Routine     Referral Type:   Eval and Treat     Referral Reason:   Specialty Services Required     Referred to Provider:   Sandra Galindo MD     Requested Specialty:   Gastroenterology     Number of Visits Requested:   1     Orders Placed This Encounter   Medications    ibuprofen (ADVIL;MOTRIN) 800 MG tablet     Sig: Take 1 tablet by mouth every 6 hours as needed for Pain     Dispense:  120 tablet     Refill:  0         Discussed use, benefit, and side effects of prescribed medications. All patient questions answered. Pt voiced understanding. Reviewed health maintenance. .  Patient agreed with treatment plan. Follow up as directed. Patient Instructions     Patient Education     Check with health department or pharmacy for flu, pnuemonia, and shingles vaccines    Fasting labs     Well Visit, Men 48 to 72: Care Instructions  Your Care Instructions    Physical exams can help you stay healthy. Your doctor has checked your overall health and may have suggested ways to take good care of yourself. He or she also may have recommended tests. At home, you can help prevent illness with healthy eating, regular exercise, and other steps. Follow-up care is a key part of your treatment and safety. Be sure to make and go to all appointments, and call your doctor if you are having problems. It's also a good idea to know your test results and keep a list of the medicines you take. How can you care for yourself at home? · Reach and stay at a healthy weight. This will lower your risk for many problems, such as obesity, diabetes, heart disease, and high blood pressure. · Get at least 30 minutes of exercise on most days of the week. Walking is a good choice. You also may want to do other activities, such as running, swimming, cycling, or playing tennis or team sports. · Do not smoke. Smoking can make health problems worse. If you need help quitting, talk to your doctor about stop-smoking programs and medicines. These can increase your chances of quitting for good. · Protect your skin from too much sun. When you're outdoors from 10 a.m. to 4 p.m., stay in the shade or cover up with clothing and a hat with a wide brim. Wear sunglasses that block UV rays. Even when it's cloudy, put broad-spectrum sunscreen (SPF 30 or higher) on any exposed skin. · See a dentist one or two times a year for checkups and to have your teeth cleaned. · Wear a seat belt in the car.   · Limit alcohol to 2 drinks a day. Too much alcohol can cause health problems. Follow your doctor's advice about when to have certain tests. These tests can spot problems early. · Cholesterol. Your doctor will tell you how often to have this done based on your overall health and other things that can increase your risk for heart attack and stroke. · Blood pressure. Have your blood pressure checked during a routine doctor visit. Your doctor will tell you how often to check your blood pressure based on your age, your blood pressure results, and other factors. · Prostate exam. Talk to your doctor about whether you should have a blood test (called a PSA test) for prostate cancer. Experts disagree on whether men should have this test. Some experts recommend that you discuss the benefits and risks of the test with your doctor. · Diabetes. Ask your doctor whether you should have tests for diabetes. · Vision. Some experts recommend that you have yearly exams for glaucoma and other age-related eye problems starting at age 48. · Hearing. Tell your doctor if you notice any change in your hearing. You can have tests to find out how well you hear. · Colon cancer. You should begin tests for colon cancer at age 48. You may have one of several tests. Your doctor will tell you how often to have tests based on your age and risk. Risks include whether you already had a precancerous polyp removed from your colon or whether your parent, brother, sister, or child has had colon cancer. · Heart attack and stroke risk. At least every 4 to 6 years, you should have your risk for heart attack and stroke assessed. Your doctor uses factors such as your age, blood pressure, cholesterol, and whether you smoke or have diabetes to show what your risk for a heart attack or stroke is over the next 10 years. · Abdominal aortic aneurysm. Ask your doctor whether you should have a test to check for an aneurysm.  You may need a test if you ever smoked or

## 2018-09-17 NOTE — PATIENT INSTRUCTIONS
often to have this done based on your overall health and other things that can increase your risk for heart attack and stroke. · Blood pressure. Have your blood pressure checked during a routine doctor visit. Your doctor will tell you how often to check your blood pressure based on your age, your blood pressure results, and other factors. · Prostate exam. Talk to your doctor about whether you should have a blood test (called a PSA test) for prostate cancer. Experts disagree on whether men should have this test. Some experts recommend that you discuss the benefits and risks of the test with your doctor. · Diabetes. Ask your doctor whether you should have tests for diabetes. · Vision. Some experts recommend that you have yearly exams for glaucoma and other age-related eye problems starting at age 48. · Hearing. Tell your doctor if you notice any change in your hearing. You can have tests to find out how well you hear. · Colon cancer. You should begin tests for colon cancer at age 48. You may have one of several tests. Your doctor will tell you how often to have tests based on your age and risk. Risks include whether you already had a precancerous polyp removed from your colon or whether your parent, brother, sister, or child has had colon cancer. · Heart attack and stroke risk. At least every 4 to 6 years, you should have your risk for heart attack and stroke assessed. Your doctor uses factors such as your age, blood pressure, cholesterol, and whether you smoke or have diabetes to show what your risk for a heart attack or stroke is over the next 10 years. · Abdominal aortic aneurysm. Ask your doctor whether you should have a test to check for an aneurysm. You may need a test if you ever smoked or if your parent, brother, sister, or child has had an aneurysm. When should you call for help?   Watch closely for changes in your health, and be sure to contact your doctor if you have any problems or symptoms that

## 2018-10-18 ENCOUNTER — TELEPHONE (OUTPATIENT)
Dept: GASTROENTEROLOGY | Age: 53
End: 2018-10-18

## 2018-10-28 DIAGNOSIS — G89.29 CHRONIC LEFT-SIDED LOW BACK PAIN WITH LEFT-SIDED SCIATICA: ICD-10-CM

## 2018-10-28 DIAGNOSIS — M54.42 CHRONIC LEFT-SIDED LOW BACK PAIN WITH LEFT-SIDED SCIATICA: ICD-10-CM

## 2018-10-29 RX ORDER — IBUPROFEN 800 MG/1
800 TABLET ORAL EVERY 6 HOURS PRN
Qty: 120 TABLET | Refills: 3 | Status: SHIPPED | OUTPATIENT
Start: 2018-10-29 | End: 2019-03-02 | Stop reason: SDUPTHER

## 2018-11-21 ENCOUNTER — OFFICE VISIT (OUTPATIENT)
Dept: PRIMARY CARE CLINIC | Age: 53
End: 2018-11-21
Payer: MEDICAID

## 2018-11-21 VITALS
SYSTOLIC BLOOD PRESSURE: 125 MMHG | OXYGEN SATURATION: 97 % | BODY MASS INDEX: 27.09 KG/M2 | DIASTOLIC BLOOD PRESSURE: 83 MMHG | HEART RATE: 94 BPM | TEMPERATURE: 97.8 F | HEIGHT: 72 IN | WEIGHT: 200 LBS

## 2018-11-21 DIAGNOSIS — M54.2 CHRONIC NECK PAIN: ICD-10-CM

## 2018-11-21 DIAGNOSIS — M51.26 LUMBAR DISC HERNIATION: Primary | ICD-10-CM

## 2018-11-21 DIAGNOSIS — G89.29 CHRONIC NECK PAIN: ICD-10-CM

## 2018-11-21 DIAGNOSIS — M50.20 CERVICAL DISC HERNIATION: ICD-10-CM

## 2018-11-21 DIAGNOSIS — M54.42 CHRONIC LEFT-SIDED LOW BACK PAIN WITH LEFT-SIDED SCIATICA: ICD-10-CM

## 2018-11-21 DIAGNOSIS — G89.29 CHRONIC LEFT-SIDED LOW BACK PAIN WITH LEFT-SIDED SCIATICA: ICD-10-CM

## 2018-11-21 PROCEDURE — G8484 FLU IMMUNIZE NO ADMIN: HCPCS | Performed by: NURSE PRACTITIONER

## 2018-11-21 PROCEDURE — G8417 CALC BMI ABV UP PARAM F/U: HCPCS | Performed by: NURSE PRACTITIONER

## 2018-11-21 PROCEDURE — 3017F COLORECTAL CA SCREEN DOC REV: CPT | Performed by: NURSE PRACTITIONER

## 2018-11-21 PROCEDURE — 99213 OFFICE O/P EST LOW 20 MIN: CPT | Performed by: NURSE PRACTITIONER

## 2018-11-21 PROCEDURE — G8427 DOCREV CUR MEDS BY ELIG CLIN: HCPCS | Performed by: NURSE PRACTITIONER

## 2018-11-21 PROCEDURE — 4004F PT TOBACCO SCREEN RCVD TLK: CPT | Performed by: NURSE PRACTITIONER

## 2018-11-21 ASSESSMENT — ENCOUNTER SYMPTOMS
DIARRHEA: 0
BACK PAIN: 1
SORE THROAT: 0
RHINORRHEA: 0
CONSTIPATION: 0
TROUBLE SWALLOWING: 0
COUGH: 0
VOMITING: 0
ABDOMINAL PAIN: 0
SHORTNESS OF BREATH: 0
NAUSEA: 0

## 2018-12-04 ENCOUNTER — TELEPHONE (OUTPATIENT)
Dept: PRIMARY CARE CLINIC | Age: 53
End: 2018-12-04

## 2018-12-13 ENCOUNTER — HOSPITAL ENCOUNTER (OUTPATIENT)
Dept: PAIN MANAGEMENT | Age: 53
Discharge: HOME | End: 2018-12-13
Payer: MEDICAID

## 2018-12-13 ENCOUNTER — HOSPITAL ENCOUNTER (OUTPATIENT)
Dept: GENERAL RADIOLOGY | Age: 53
Discharge: HOME | End: 2018-12-13
Payer: MEDICAID

## 2018-12-13 VITALS
WEIGHT: 200 LBS | HEIGHT: 72 IN | HEART RATE: 82 BPM | OXYGEN SATURATION: 98 % | TEMPERATURE: 98.6 F | BODY MASS INDEX: 27.09 KG/M2 | RESPIRATION RATE: 18 BRPM | SYSTOLIC BLOOD PRESSURE: 146 MMHG | DIASTOLIC BLOOD PRESSURE: 92 MMHG

## 2018-12-13 DIAGNOSIS — M54.42 CHRONIC LEFT-SIDED LOW BACK PAIN WITH LEFT-SIDED SCIATICA: ICD-10-CM

## 2018-12-13 DIAGNOSIS — M79.18 BILATERAL MYOFASCIAL PAIN: ICD-10-CM

## 2018-12-13 DIAGNOSIS — M46.1 SACROILIITIS (HCC): ICD-10-CM

## 2018-12-13 DIAGNOSIS — G89.29 CHRONIC LEFT-SIDED LOW BACK PAIN WITH LEFT-SIDED SCIATICA: ICD-10-CM

## 2018-12-13 DIAGNOSIS — M54.16 LUMBAR RADICULOPATHY: Primary | ICD-10-CM

## 2018-12-13 PROCEDURE — 99214 OFFICE O/P EST MOD 30 MIN: CPT | Performed by: NURSE PRACTITIONER

## 2018-12-13 PROCEDURE — 99205 OFFICE O/P NEW HI 60 MIN: CPT

## 2018-12-13 RX ORDER — GABAPENTIN 300 MG/1
900 CAPSULE ORAL 3 TIMES DAILY
Qty: 90 CAPSULE | Refills: 2 | Status: SHIPPED | OUTPATIENT
Start: 2018-12-13 | End: 2019-02-08 | Stop reason: SDUPTHER

## 2018-12-13 RX ORDER — EMOLLIENT BASE
CREAM (GRAM) TOPICAL
Qty: 300 G | Refills: 5 | Status: SHIPPED | OUTPATIENT
Start: 2018-12-13

## 2018-12-13 RX ORDER — HYDROCODONE BITARTRATE AND ACETAMINOPHEN 7.5; 325 MG/1; MG/1
1 TABLET ORAL EVERY 8 HOURS PRN
Qty: 90 TABLET | Refills: 0 | Status: SHIPPED | OUTPATIENT
Start: 2018-12-13 | End: 2019-01-10 | Stop reason: SDUPTHER

## 2018-12-13 RX ORDER — AMITRIPTYLINE HYDROCHLORIDE 25 MG/1
TABLET, FILM COATED ORAL
Qty: 75 TABLET | Refills: 1 | Status: SHIPPED | OUTPATIENT
Start: 2018-12-13 | End: 2019-02-11 | Stop reason: SDUPTHER

## 2018-12-13 ASSESSMENT — PAIN DESCRIPTION - ONSET: ONSET: ON-GOING

## 2018-12-13 ASSESSMENT — ENCOUNTER SYMPTOMS
CONSTIPATION: 0
BACK PAIN: 1

## 2018-12-13 ASSESSMENT — PAIN DESCRIPTION - ORIENTATION: ORIENTATION: LOWER

## 2018-12-13 ASSESSMENT — ACTIVITIES OF DAILY LIVING (ADL): EFFECT OF PAIN ON DAILY ACTIVITIES: LIMITS ACTIVITIES

## 2018-12-13 ASSESSMENT — PAIN DESCRIPTION - PROGRESSION: CLINICAL_PROGRESSION: NOT CHANGED

## 2018-12-13 ASSESSMENT — PAIN DESCRIPTION - LOCATION: LOCATION: BACK

## 2018-12-13 ASSESSMENT — PAIN DESCRIPTION - PAIN TYPE: TYPE: CHRONIC PAIN

## 2018-12-13 ASSESSMENT — PAIN SCALES - GENERAL: PAINLEVEL_OUTOF10: 6

## 2018-12-13 ASSESSMENT — PAIN DESCRIPTION - FREQUENCY: FREQUENCY: CONTINUOUS

## 2018-12-13 NOTE — H&P
no suspicious focal bony mass or marrow edema. There is no abnormal enhancement of the vertebra. There is no abnormal signal seen within the cervical spinal cord. A  small arachnoid cyst may be present within the posterior fossa based on the sagittal images. There is no abnormal enhancement within the  cervical spinal cord. At C2/3, there is no central canal stenosis or neural foramen narrowing. At C3/4, there is mild posterior lateral endplate spurring and facet  osteoarthropathy. There is no central spinal canal stenosis with mild to  moderate right neural foramen narrowing. At C4/5, there is mild posterior lateral endplate spurring and mild to  moderate facet osteoarthropathy. There is no central spinal canal  stenosis. There is mild left and borderline right neural foramen  narrowing. At C5/6, there is right paracentral endplate spurring and disc bulging. There is overall moderate central spinal canal stenosis. There is  uncinate process hypertrophy with mild facet osteoarthropathy. I suspect there is moderate bilateral neural foramen narrowing at this level. At C6/7, there is left foraminal endplate spurring and disc bulging. There is only mild central spinal canal stenosis. There is severe left  and moderate right neural foramen narrowing. At C7-T1, there is no central canal stenosis or neural foramen narrowing identified. IMPRESSION-  1. Multilevel degenerative disc disease with uncinate process  hypertrophy and facet osteoarthropathy. There is moderate spinal canal  stenosis at C5/6 due to right paracentral endplate spurring and disc  bulging. There is moderate bilateral C5/6 neural foramen narrowing. The degenerative changes at C6/7 result in severe left-sided neural foramen narrowing. Please refer to dictation above. 2. No abnormal signal seen in the cervical spinal cord. No abnormal  enhancement. 3. Questionable arachnoid cyst in the posterior fossa.     Physical exam: Patient Vitals for the past 24 hrs:   BP Temp Temp src Pulse Resp SpO2 Height Weight   12/13/18 1145 (!) 146/92 98.6 °F (37 °C) Oral 82 18 98 % 6' (1.829 m) 200 lb (90.7 kg)       Body mass index is 27.12 kg/m². Physical Exam   Constitutional: He is oriented to person, place, and time. He appears well-developed and well-nourished. No distress. HENT:   Head: Normocephalic. Right Ear: External ear normal.   Left Ear: External ear normal.   Nose: Nose normal.   Eyes: Pupils are equal, round, and reactive to light. Conjunctivae and EOM are normal.   Neck: Normal range of motion. Neck supple. Cardiovascular: Normal rate. Pulmonary/Chest: Effort normal.   Abdominal: Soft. Bowel sounds are normal. There is no hepatosplenomegaly. Musculoskeletal:        Lumbar back: He exhibits decreased range of motion, tenderness, pain and spasm. Back:    Patient has positive straight leg raises with increased lower back pain and on the left he does have pain that goes down the back of his leg. Patient has positive Rainbow maneuver with left being worse than right. patient is tender to palpation in both SI joints with left worse than right   Neurological: He is alert and oriented to person, place, and time. He has normal strength. No sensory deficit. Skin: Skin is warm and dry. Psychiatric: He has a normal mood and affect. His behavior is normal. Judgment and thought content normal.   Nursing note and vitals reviewed. Assessment:                                                                                                               Active Problems:    Restless leg syndrome    Chronic left-sided low back pain with left-sided sciatica    Sacroiliitis (HCC)    Bilateral myofascial pain  Resolved Problems:    * No resolved hospital problems. *       PLAN:  1. Schedule bilateral SI injections with US guidance  2. Obtain x-ray of lumbar spine with flexion and extension pelvis and bilateral hips.   3. PT to

## 2019-01-09 ENCOUNTER — HOSPITAL ENCOUNTER (OUTPATIENT)
Dept: GENERAL RADIOLOGY | Age: 54
Discharge: HOME | End: 2019-01-09
Payer: MEDICAID

## 2019-01-09 ENCOUNTER — HOSPITAL ENCOUNTER (OUTPATIENT)
Dept: PAIN MANAGEMENT | Age: 54
Discharge: HOME | End: 2019-01-09
Payer: MEDICAID

## 2019-01-09 VITALS
HEART RATE: 107 BPM | RESPIRATION RATE: 20 BRPM | DIASTOLIC BLOOD PRESSURE: 79 MMHG | SYSTOLIC BLOOD PRESSURE: 126 MMHG | TEMPERATURE: 97.9 F | OXYGEN SATURATION: 96 %

## 2019-01-09 DIAGNOSIS — G89.29 CHRONIC LEFT-SIDED LOW BACK PAIN WITH LEFT-SIDED SCIATICA: ICD-10-CM

## 2019-01-09 DIAGNOSIS — M54.42 CHRONIC LEFT-SIDED LOW BACK PAIN WITH LEFT-SIDED SCIATICA: ICD-10-CM

## 2019-01-09 PROCEDURE — 72100 X-RAY EXAM L-S SPINE 2/3 VWS: CPT

## 2019-01-09 PROCEDURE — 76942 ECHO GUIDE FOR BIOPSY: CPT | Performed by: NURSE PRACTITIONER

## 2019-01-09 PROCEDURE — 20611 DRAIN/INJ JOINT/BURSA W/US: CPT

## 2019-01-09 PROCEDURE — 2500000003 HC RX 250 WO HCPCS

## 2019-01-09 PROCEDURE — 72170 X-RAY EXAM OF PELVIS: CPT

## 2019-01-09 PROCEDURE — 20553 NJX 1/MLT TRIGGER POINTS 3/>: CPT | Performed by: NURSE PRACTITIONER

## 2019-01-09 PROCEDURE — 6360000002 HC RX W HCPCS

## 2019-01-09 RX ORDER — LIDOCAINE HYDROCHLORIDE 10 MG/ML
INJECTION, SOLUTION EPIDURAL; INFILTRATION; INTRACAUDAL; PERINEURAL
Status: COMPLETED | OUTPATIENT
Start: 2019-01-09 | End: 2019-01-09

## 2019-01-09 RX ORDER — BUPIVACAINE HYDROCHLORIDE 5 MG/ML
INJECTION, SOLUTION EPIDURAL; INTRACAUDAL
Status: COMPLETED | OUTPATIENT
Start: 2019-01-09 | End: 2019-01-09

## 2019-01-09 RX ADMIN — BUPIVACAINE HYDROCHLORIDE 2 ML: 5 INJECTION, SOLUTION EPIDURAL; INTRACAUDAL at 14:49

## 2019-01-09 RX ADMIN — LIDOCAINE HYDROCHLORIDE 2 ML: 10 INJECTION, SOLUTION EPIDURAL; INFILTRATION; INTRACAUDAL; PERINEURAL at 14:49

## 2019-01-09 ASSESSMENT — PAIN - FUNCTIONAL ASSESSMENT: PAIN_FUNCTIONAL_ASSESSMENT: 0-10

## 2019-01-10 DIAGNOSIS — M51.36 DDD (DEGENERATIVE DISC DISEASE), LUMBAR: Primary | ICD-10-CM

## 2019-01-10 DIAGNOSIS — M54.16 LUMBAR RADICULOPATHY: ICD-10-CM

## 2019-01-10 RX ORDER — HYDROCODONE BITARTRATE AND ACETAMINOPHEN 7.5; 325 MG/1; MG/1
1 TABLET ORAL EVERY 8 HOURS PRN
Qty: 90 TABLET | Refills: 0 | Status: SHIPPED | OUTPATIENT
Start: 2019-01-12 | End: 2019-02-11 | Stop reason: SDUPTHER

## 2019-01-17 ENCOUNTER — TELEPHONE (OUTPATIENT)
Dept: PAIN MANAGEMENT | Age: 54
End: 2019-01-17

## 2019-01-17 DIAGNOSIS — M54.16 LUMBAR RADICULOPATHY: Primary | ICD-10-CM

## 2019-01-25 ENCOUNTER — HOSPITAL ENCOUNTER (OUTPATIENT)
Dept: MRI IMAGING | Age: 54
Discharge: HOME | End: 2019-01-25
Payer: MEDICAID

## 2019-01-25 DIAGNOSIS — M54.16 LUMBAR RADICULOPATHY: ICD-10-CM

## 2019-01-25 PROCEDURE — 72148 MRI LUMBAR SPINE W/O DYE: CPT

## 2019-02-08 DIAGNOSIS — M54.16 LUMBAR RADICULOPATHY: ICD-10-CM

## 2019-02-09 RX ORDER — GABAPENTIN 300 MG/1
900 CAPSULE ORAL 3 TIMES DAILY
Qty: 270 CAPSULE | Refills: 2 | Status: SHIPPED | OUTPATIENT
Start: 2019-02-11 | End: 2019-02-21 | Stop reason: SDUPTHER

## 2019-02-11 DIAGNOSIS — M54.16 LUMBAR RADICULOPATHY: ICD-10-CM

## 2019-02-11 RX ORDER — HYDROCODONE BITARTRATE AND ACETAMINOPHEN 7.5; 325 MG/1; MG/1
1 TABLET ORAL EVERY 8 HOURS PRN
Qty: 90 TABLET | Refills: 0 | Status: SHIPPED | OUTPATIENT
Start: 2019-02-12 | End: 2019-02-21 | Stop reason: SDUPTHER

## 2019-02-11 RX ORDER — AMITRIPTYLINE HYDROCHLORIDE 25 MG/1
TABLET, FILM COATED ORAL
Qty: 75 TABLET | Refills: 2 | Status: SHIPPED | OUTPATIENT
Start: 2019-02-11 | End: 2019-02-21 | Stop reason: SDUPTHER

## 2019-02-21 ENCOUNTER — HOSPITAL ENCOUNTER (OUTPATIENT)
Dept: PAIN MANAGEMENT | Age: 54
Discharge: HOME | End: 2019-02-21
Payer: MEDICAID

## 2019-02-21 VITALS
RESPIRATION RATE: 18 BRPM | HEART RATE: 85 BPM | BODY MASS INDEX: 28.44 KG/M2 | OXYGEN SATURATION: 97 % | SYSTOLIC BLOOD PRESSURE: 141 MMHG | DIASTOLIC BLOOD PRESSURE: 76 MMHG | HEIGHT: 72 IN | WEIGHT: 210 LBS | TEMPERATURE: 98.6 F

## 2019-02-21 DIAGNOSIS — M54.16 LUMBAR RADICULOPATHY: ICD-10-CM

## 2019-02-21 DIAGNOSIS — F15.10 METHAMPHETAMINE USE (HCC): Primary | ICD-10-CM

## 2019-02-21 PROCEDURE — 99213 OFFICE O/P EST LOW 20 MIN: CPT

## 2019-02-21 PROCEDURE — 99214 OFFICE O/P EST MOD 30 MIN: CPT | Performed by: NURSE PRACTITIONER

## 2019-02-21 RX ORDER — HYDROCODONE BITARTRATE AND ACETAMINOPHEN 7.5; 325 MG/1; MG/1
1 TABLET ORAL EVERY 8 HOURS PRN
Qty: 90 TABLET | Refills: 0 | Status: SHIPPED | OUTPATIENT
Start: 2019-03-12 | End: 2019-06-19

## 2019-02-21 RX ORDER — AMITRIPTYLINE HYDROCHLORIDE 25 MG/1
TABLET, FILM COATED ORAL
Qty: 75 TABLET | Refills: 2 | Status: SHIPPED | OUTPATIENT
Start: 2019-02-21 | End: 2019-12-13

## 2019-02-21 RX ORDER — GABAPENTIN 300 MG/1
900 CAPSULE ORAL 3 TIMES DAILY
Qty: 270 CAPSULE | Refills: 2 | Status: SHIPPED | OUTPATIENT
Start: 2019-03-10 | End: 2019-08-27

## 2019-02-21 ASSESSMENT — PAIN DESCRIPTION - LOCATION: LOCATION: BACK

## 2019-02-21 ASSESSMENT — PAIN - FUNCTIONAL ASSESSMENT: PAIN_FUNCTIONAL_ASSESSMENT: PREVENTS OR INTERFERES SOME ACTIVE ACTIVITIES AND ADLS

## 2019-02-21 ASSESSMENT — PAIN DESCRIPTION - PROGRESSION: CLINICAL_PROGRESSION: NOT CHANGED

## 2019-02-21 ASSESSMENT — PAIN SCALES - GENERAL: PAINLEVEL_OUTOF10: 5

## 2019-02-21 ASSESSMENT — PAIN DESCRIPTION - ONSET: ONSET: ON-GOING

## 2019-02-21 ASSESSMENT — PAIN DESCRIPTION - FREQUENCY: FREQUENCY: CONTINUOUS

## 2019-02-21 ASSESSMENT — PAIN DESCRIPTION - ORIENTATION: ORIENTATION: LOWER

## 2019-02-21 ASSESSMENT — PAIN DESCRIPTION - PAIN TYPE: TYPE: CHRONIC PAIN

## 2019-02-21 ASSESSMENT — ACTIVITIES OF DAILY LIVING (ADL): EFFECT OF PAIN ON DAILY ACTIVITIES: LIMITS ACTIVITY

## 2019-02-25 ASSESSMENT — ENCOUNTER SYMPTOMS
BACK PAIN: 1
CONSTIPATION: 0

## 2019-02-26 ENCOUNTER — TELEPHONE (OUTPATIENT)
Dept: PAIN MANAGEMENT | Age: 54
End: 2019-02-26

## 2019-03-02 DIAGNOSIS — M54.42 CHRONIC LEFT-SIDED LOW BACK PAIN WITH LEFT-SIDED SCIATICA: ICD-10-CM

## 2019-03-02 DIAGNOSIS — G89.29 CHRONIC LEFT-SIDED LOW BACK PAIN WITH LEFT-SIDED SCIATICA: ICD-10-CM

## 2019-03-04 RX ORDER — IBUPROFEN 800 MG/1
800 TABLET ORAL EVERY 6 HOURS PRN
Qty: 120 TABLET | Refills: 3 | Status: SHIPPED | OUTPATIENT
Start: 2019-03-04 | End: 2019-08-05 | Stop reason: SDUPTHER

## 2019-03-11 ENCOUNTER — TELEPHONE (OUTPATIENT)
Dept: PAIN MANAGEMENT | Age: 54
End: 2019-03-11

## 2019-03-27 ENCOUNTER — OFFICE VISIT (OUTPATIENT)
Dept: PRIMARY CARE CLINIC | Age: 54
End: 2019-03-27
Payer: MEDICAID

## 2019-03-27 VITALS
OXYGEN SATURATION: 98 % | TEMPERATURE: 97.4 F | SYSTOLIC BLOOD PRESSURE: 125 MMHG | BODY MASS INDEX: 29.12 KG/M2 | WEIGHT: 215 LBS | HEIGHT: 72 IN | HEART RATE: 78 BPM | DIASTOLIC BLOOD PRESSURE: 76 MMHG

## 2019-03-27 DIAGNOSIS — Z12.11 COLON CANCER SCREENING: ICD-10-CM

## 2019-03-27 DIAGNOSIS — M20.5X2 OVERLAPPING TOE, LEFT: ICD-10-CM

## 2019-03-27 DIAGNOSIS — M20.5X1 OVERLAPPING TOE, RIGHT: Primary | ICD-10-CM

## 2019-03-27 DIAGNOSIS — F41.1 GAD (GENERALIZED ANXIETY DISORDER): ICD-10-CM

## 2019-03-27 DIAGNOSIS — F33.1 MODERATE EPISODE OF RECURRENT MAJOR DEPRESSIVE DISORDER (HCC): ICD-10-CM

## 2019-03-27 PROCEDURE — 3017F COLORECTAL CA SCREEN DOC REV: CPT | Performed by: NURSE PRACTITIONER

## 2019-03-27 PROCEDURE — 99214 OFFICE O/P EST MOD 30 MIN: CPT | Performed by: NURSE PRACTITIONER

## 2019-03-27 PROCEDURE — G8427 DOCREV CUR MEDS BY ELIG CLIN: HCPCS | Performed by: NURSE PRACTITIONER

## 2019-03-27 PROCEDURE — G8417 CALC BMI ABV UP PARAM F/U: HCPCS | Performed by: NURSE PRACTITIONER

## 2019-03-27 PROCEDURE — 4004F PT TOBACCO SCREEN RCVD TLK: CPT | Performed by: NURSE PRACTITIONER

## 2019-03-27 PROCEDURE — G8484 FLU IMMUNIZE NO ADMIN: HCPCS | Performed by: NURSE PRACTITIONER

## 2019-03-27 RX ORDER — BUSPIRONE HYDROCHLORIDE 7.5 MG/1
7.5 TABLET ORAL 2 TIMES DAILY
Qty: 60 TABLET | Refills: 11 | Status: SHIPPED | OUTPATIENT
Start: 2019-03-27 | End: 2019-08-27 | Stop reason: DRUGHIGH

## 2019-03-27 ASSESSMENT — ENCOUNTER SYMPTOMS
TROUBLE SWALLOWING: 0
VOMITING: 0
COUGH: 0
ABDOMINAL PAIN: 0
RHINORRHEA: 0
NAUSEA: 0
DIARRHEA: 0
SHORTNESS OF BREATH: 0
CONSTIPATION: 0
SORE THROAT: 0

## 2019-03-27 ASSESSMENT — PATIENT HEALTH QUESTIONNAIRE - PHQ9
SUM OF ALL RESPONSES TO PHQ9 QUESTIONS 1 & 2: 2
1. LITTLE INTEREST OR PLEASURE IN DOING THINGS: 1
2. FEELING DOWN, DEPRESSED OR HOPELESS: 1
SUM OF ALL RESPONSES TO PHQ QUESTIONS 1-9: 2
SUM OF ALL RESPONSES TO PHQ QUESTIONS 1-9: 2

## 2019-05-01 ENCOUNTER — TELEPHONE (OUTPATIENT)
Dept: PRIMARY CARE CLINIC | Age: 54
End: 2019-05-01

## 2019-05-27 ENCOUNTER — APPOINTMENT (OUTPATIENT)
Dept: CT IMAGING | Facility: HOSPITAL | Age: 54
End: 2019-05-27

## 2019-05-27 ENCOUNTER — HOSPITAL ENCOUNTER (EMERGENCY)
Facility: HOSPITAL | Age: 54
Discharge: HOME | End: 2019-05-28
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
## 2019-05-27 LAB
ALBUMIN SERPL-MCNC: 4.2 G/DL (ref 3.5–5)
ALBUMIN/GLOB SERPL: 1 G/DL (ref 1.1–2.5)
ALP SERPL-CCNC: 84 U/L (ref 24–120)
ALT SERPL W P-5'-P-CCNC: 16 U/L (ref 0–54)
ANION GAP SERPL CALCULATED.3IONS-SCNC: 11 MMOL/L (ref 4–13)
APTT PPP: 32.2 SECONDS (ref 24.1–35)
AST SERPL-CCNC: 42 U/L (ref 7–45)
BASOPHILS # BLD AUTO: 0.05 10*3/MM3 (ref 0–0.2)
BASOPHILS NFR BLD AUTO: 0.3 % (ref 0–2)
BILIRUB SERPL-MCNC: 0.4 MG/DL (ref 0.1–1)
BUN BLD-MCNC: 16 MG/DL (ref 5–21)
BUN/CREAT SERPL: 17.4 (ref 7–25)
CALCIUM SPEC-SCNC: 9.4 MG/DL (ref 8.4–10.4)
CHLORIDE SERPL-SCNC: 107 MMOL/L (ref 98–110)
CO2 SERPL-SCNC: 22 MMOL/L (ref 24–31)
CREAT BLD-MCNC: 0.92 MG/DL (ref 0.5–1.4)
DEPRECATED RDW RBC AUTO: 40.6 FL (ref 40–54)
EOSINOPHIL # BLD AUTO: 0.1 10*3/MM3 (ref 0–0.7)
EOSINOPHIL NFR BLD AUTO: 0.6 % (ref 0–4)
ERYTHROCYTE [DISTWIDTH] IN BLOOD BY AUTOMATED COUNT: 13.2 % (ref 12–15)
ETHANOL UR QL: <0.01 %
GFR SERPL CREATININE-BSD FRML MDRD: 86 ML/MIN/1.73
GLOBULIN UR ELPH-MCNC: 4.2 GM/DL
GLUCOSE BLD-MCNC: 110 MG/DL (ref 70–100)
HCT VFR BLD AUTO: 35.2 % (ref 40–52)
HGB BLD-MCNC: 11.8 G/DL (ref 14–18)
IMM GRANULOCYTES # BLD AUTO: 0.05 10*3/MM3 (ref 0–0.05)
IMM GRANULOCYTES NFR BLD AUTO: 0.3 % (ref 0–5)
INR PPP: 1 (ref 0.91–1.09)
LYMPHOCYTES # BLD AUTO: 2.04 10*3/MM3 (ref 0.72–4.86)
LYMPHOCYTES NFR BLD AUTO: 13 % (ref 15–45)
MCH RBC QN AUTO: 28.3 PG (ref 28–32)
MCHC RBC AUTO-ENTMCNC: 33.5 G/DL (ref 33–36)
MCV RBC AUTO: 84.4 FL (ref 82–95)
MONOCYTES # BLD AUTO: 0.94 10*3/MM3 (ref 0.19–1.3)
MONOCYTES NFR BLD AUTO: 6 % (ref 4–12)
NEUTROPHILS # BLD AUTO: 12.53 10*3/MM3 (ref 1.87–8.4)
NEUTROPHILS NFR BLD AUTO: 79.8 % (ref 39–78)
NRBC BLD AUTO-RTO: 0 /100 WBC (ref 0–0.2)
PLATELET # BLD AUTO: 280 10*3/MM3 (ref 130–400)
PMV BLD AUTO: 10.1 FL (ref 6–12)
POTASSIUM BLD-SCNC: 4.2 MMOL/L (ref 3.5–5.3)
PROT SERPL-MCNC: 8.4 G/DL (ref 6.3–8.7)
PROTHROMBIN TIME: 13.5 SECONDS (ref 11.9–14.6)
RBC # BLD AUTO: 4.17 10*6/MM3 (ref 4.8–5.9)
SODIUM BLD-SCNC: 140 MMOL/L (ref 135–145)
WBC NRBC COR # BLD: 15.71 10*3/MM3 (ref 4.8–10.8)

## 2019-05-27 PROCEDURE — 96365 THER/PROPH/DIAG IV INF INIT: CPT

## 2019-05-27 PROCEDURE — 70450 CT HEAD/BRAIN W/O DYE: CPT

## 2019-05-27 PROCEDURE — 85025 COMPLETE CBC W/AUTO DIFF WBC: CPT | Performed by: NURSE PRACTITIONER

## 2019-05-27 PROCEDURE — 25010000002 RABIES VACCINE,HUMAN DIPLOID INJECTION: Performed by: EMERGENCY MEDICINE

## 2019-05-27 PROCEDURE — 90375 RABIES IG IM/SC: CPT | Performed by: EMERGENCY MEDICINE

## 2019-05-27 PROCEDURE — 90472 IMMUNIZATION ADMIN EACH ADD: CPT

## 2019-05-27 PROCEDURE — 87147 CULTURE TYPE IMMUNOLOGIC: CPT | Performed by: NURSE PRACTITIONER

## 2019-05-27 PROCEDURE — 90715 TDAP VACCINE 7 YRS/> IM: CPT | Performed by: NURSE PRACTITIONER

## 2019-05-27 PROCEDURE — 87040 BLOOD CULTURE FOR BACTERIA: CPT | Performed by: NURSE PRACTITIONER

## 2019-05-27 PROCEDURE — 25010000003 CEFAZOLIN PER 500 MG: Performed by: NURSE PRACTITIONER

## 2019-05-27 PROCEDURE — 85610 PROTHROMBIN TIME: CPT | Performed by: NURSE PRACTITIONER

## 2019-05-27 PROCEDURE — 85730 THROMBOPLASTIN TIME PARTIAL: CPT | Performed by: NURSE PRACTITIONER

## 2019-05-27 PROCEDURE — 90471 IMMUNIZATION ADMIN: CPT | Performed by: NURSE PRACTITIONER

## 2019-05-27 PROCEDURE — 25010000002 ONDANSETRON PER 1 MG: Performed by: NURSE PRACTITIONER

## 2019-05-27 PROCEDURE — 96375 TX/PRO/DX INJ NEW DRUG ADDON: CPT

## 2019-05-27 PROCEDURE — 87150 DNA/RNA AMPLIFIED PROBE: CPT | Performed by: NURSE PRACTITIONER

## 2019-05-27 PROCEDURE — 72125 CT NECK SPINE W/O DYE: CPT

## 2019-05-27 PROCEDURE — 96372 THER/PROPH/DIAG INJ SC/IM: CPT

## 2019-05-27 PROCEDURE — 25010000002 TDAP 5-2.5-18.5 LF-MCG/0.5 SUSPENSION: Performed by: NURSE PRACTITIONER

## 2019-05-27 PROCEDURE — 70486 CT MAXILLOFACIAL W/O DYE: CPT

## 2019-05-27 PROCEDURE — 80307 DRUG TEST PRSMV CHEM ANLYZR: CPT | Performed by: NURSE PRACTITIONER

## 2019-05-27 PROCEDURE — 99285 EMERGENCY DEPT VISIT HI MDM: CPT

## 2019-05-27 PROCEDURE — 80053 COMPREHEN METABOLIC PANEL: CPT | Performed by: NURSE PRACTITIONER

## 2019-05-27 PROCEDURE — 25010000002 RABIES IMMUNE GLOBULIN PER 150 INT'L UNITS: Performed by: EMERGENCY MEDICINE

## 2019-05-27 PROCEDURE — 90675 RABIES VACCINE IM: CPT | Performed by: EMERGENCY MEDICINE

## 2019-05-27 RX ORDER — ONDANSETRON 2 MG/ML
4 INJECTION INTRAMUSCULAR; INTRAVENOUS ONCE
Status: COMPLETED | OUTPATIENT
Start: 2019-05-27 | End: 2019-05-27

## 2019-05-27 RX ORDER — LIDOCAINE HYDROCHLORIDE 10 MG/ML
5 INJECTION, SOLUTION EPIDURAL; INFILTRATION; INTRACAUDAL; PERINEURAL ONCE
Status: COMPLETED | OUTPATIENT
Start: 2019-05-27 | End: 2019-05-27

## 2019-05-27 RX ADMIN — LIDOCAINE HYDROCHLORIDE 5 ML: 10 INJECTION, SOLUTION EPIDURAL; INFILTRATION; INTRACAUDAL; PERINEURAL at 23:51

## 2019-05-27 RX ADMIN — RABIES IMMUNE GLOBULIN (HUMAN) 1905 UNITS: 300 INJECTION, SOLUTION INFILTRATION; INTRAMUSCULAR at 23:05

## 2019-05-27 RX ADMIN — SODIUM CHLORIDE 1 G: 900 INJECTION INTRAVENOUS at 22:54

## 2019-05-27 RX ADMIN — TETANUS TOXOID, REDUCED DIPHTHERIA TOXOID AND ACELLULAR PERTUSSIS VACCINE, ADSORBED 0.5 ML: 5; 2.5; 8; 8; 2.5 SUSPENSION INTRAMUSCULAR at 22:14

## 2019-05-27 RX ADMIN — RABIES VIRUS STRAIN PM-1503-3M ANTIGEN (PROPIOLACTONE INACTIVATED) AND WATER 2.5 UNITS: KIT at 23:24

## 2019-05-27 RX ADMIN — ONDANSETRON 4 MG: 2 INJECTION INTRAMUSCULAR; INTRAVENOUS at 22:11

## 2019-05-27 RX ADMIN — HYDROMORPHONE HYDROCHLORIDE 1 MG: 1 INJECTION, SOLUTION INTRAMUSCULAR; INTRAVENOUS; SUBCUTANEOUS at 23:49

## 2019-05-27 RX ADMIN — ONDANSETRON 4 MG: 2 INJECTION INTRAMUSCULAR; INTRAVENOUS at 23:48

## 2019-05-27 RX ADMIN — HYDROMORPHONE HYDROCHLORIDE 1 MG: 1 INJECTION, SOLUTION INTRAMUSCULAR; INTRAVENOUS; SUBCUTANEOUS at 22:11

## 2019-05-28 ENCOUNTER — ANESTHESIA EVENT (OUTPATIENT)
Dept: PERIOP | Facility: HOSPITAL | Age: 54
End: 2019-05-28

## 2019-05-28 ENCOUNTER — PREP FOR SURGERY (OUTPATIENT)
Dept: OTHER | Facility: HOSPITAL | Age: 54
End: 2019-05-28

## 2019-05-28 ENCOUNTER — HOSPITAL ENCOUNTER (EMERGENCY)
Facility: HOSPITAL | Age: 54
End: 2019-05-28

## 2019-05-28 ENCOUNTER — ANESTHESIA (OUTPATIENT)
Dept: PERIOP | Facility: HOSPITAL | Age: 54
End: 2019-05-28

## 2019-05-28 ENCOUNTER — HOSPITAL ENCOUNTER (OUTPATIENT)
Facility: HOSPITAL | Age: 54
Setting detail: HOSPITAL OUTPATIENT SURGERY
Discharge: HOME | End: 2019-05-28
Attending: OTOLARYNGOLOGY | Admitting: OTOLARYNGOLOGY

## 2019-05-28 VITALS
BODY MASS INDEX: 29.23 KG/M2 | HEART RATE: 86 BPM | TEMPERATURE: 98.9 F | RESPIRATION RATE: 16 BRPM | OXYGEN SATURATION: 95 % | SYSTOLIC BLOOD PRESSURE: 127 MMHG | HEIGHT: 71 IN | WEIGHT: 208.78 LBS | DIASTOLIC BLOOD PRESSURE: 88 MMHG

## 2019-05-28 VITALS
SYSTOLIC BLOOD PRESSURE: 140 MMHG | OXYGEN SATURATION: 98 % | HEART RATE: 98 BPM | WEIGHT: 210 LBS | BODY MASS INDEX: 28.44 KG/M2 | RESPIRATION RATE: 22 BRPM | TEMPERATURE: 98.6 F | HEIGHT: 72 IN | DIASTOLIC BLOOD PRESSURE: 84 MMHG

## 2019-05-28 DIAGNOSIS — S08.0XXA: Primary | ICD-10-CM

## 2019-05-28 LAB — BACTERIA BLD CULT: ABNORMAL

## 2019-05-28 PROCEDURE — 93010 ELECTROCARDIOGRAM REPORT: CPT | Performed by: INTERNAL MEDICINE

## 2019-05-28 PROCEDURE — 25010000002 ONDANSETRON PER 1 MG: Performed by: OTOLARYNGOLOGY

## 2019-05-28 PROCEDURE — 25010000002 FENTANYL CITRATE (PF) 100 MCG/2ML SOLUTION: Performed by: NURSE ANESTHETIST, CERTIFIED REGISTERED

## 2019-05-28 PROCEDURE — 25010000002 SUCCINYLCHOLINE PER 20 MG: Performed by: NURSE ANESTHETIST, CERTIFIED REGISTERED

## 2019-05-28 PROCEDURE — 93005 ELECTROCARDIOGRAM TRACING: CPT | Performed by: OTOLARYNGOLOGY

## 2019-05-28 PROCEDURE — 99203 OFFICE O/P NEW LOW 30 MIN: CPT | Performed by: OTOLARYNGOLOGY

## 2019-05-28 PROCEDURE — 25010000002 DEXAMETHASONE PER 1 MG: Performed by: ANESTHESIOLOGY

## 2019-05-28 PROCEDURE — 25010000002 ONDANSETRON PER 1 MG: Performed by: NURSE ANESTHETIST, CERTIFIED REGISTERED

## 2019-05-28 PROCEDURE — 25010000003 CEFAZOLIN PER 500 MG: Performed by: NURSE ANESTHETIST, CERTIFIED REGISTERED

## 2019-05-28 PROCEDURE — 14301 TIS TRNFR ANY 30.1-60 SQ CM: CPT | Performed by: OTOLARYNGOLOGY

## 2019-05-28 PROCEDURE — 25010000002 DEXAMETHASONE PER 1 MG: Performed by: NURSE ANESTHETIST, CERTIFIED REGISTERED

## 2019-05-28 PROCEDURE — 25010000002 FENTANYL CITRATE (PF) 100 MCG/2ML SOLUTION: Performed by: ANESTHESIOLOGY

## 2019-05-28 PROCEDURE — 25010000002 MIDAZOLAM PER 1 MG: Performed by: ANESTHESIOLOGY

## 2019-05-28 PROCEDURE — 25010000002 PROPOFOL 10 MG/ML EMULSION: Performed by: NURSE ANESTHETIST, CERTIFIED REGISTERED

## 2019-05-28 RX ORDER — ONDANSETRON 2 MG/ML
4 INJECTION INTRAMUSCULAR; INTRAVENOUS ONCE AS NEEDED
Status: COMPLETED | OUTPATIENT
Start: 2019-05-28 | End: 2019-05-28

## 2019-05-28 RX ORDER — BUSPIRONE HYDROCHLORIDE 10 MG/1
10 TABLET ORAL 3 TIMES DAILY
Status: ON HOLD | COMMUNITY
End: 2019-05-28

## 2019-05-28 RX ORDER — ACETAMINOPHEN 325 MG/1
650 TABLET ORAL EVERY 4 HOURS PRN
COMMUNITY
Start: 2019-05-28 | End: 2021-04-08

## 2019-05-28 RX ORDER — BUSPIRONE HYDROCHLORIDE 7.5 MG/1
7.5 TABLET ORAL 2 TIMES DAILY
COMMUNITY
End: 2020-03-26

## 2019-05-28 RX ORDER — SODIUM CHLORIDE 0.9 % (FLUSH) 0.9 %
1-10 SYRINGE (ML) INJECTION AS NEEDED
Status: DISCONTINUED | OUTPATIENT
Start: 2019-05-28 | End: 2019-05-28 | Stop reason: HOSPADM

## 2019-05-28 RX ORDER — FLUTICASONE PROPIONATE 50 MCG
2 SPRAY, SUSPENSION (ML) NASAL DAILY
COMMUNITY
End: 2020-06-18 | Stop reason: SDUPTHER

## 2019-05-28 RX ORDER — MEPERIDINE HYDROCHLORIDE 25 MG/ML
12.5 INJECTION INTRAMUSCULAR; INTRAVENOUS; SUBCUTANEOUS
Status: DISCONTINUED | OUTPATIENT
Start: 2019-05-28 | End: 2019-05-28 | Stop reason: HOSPADM

## 2019-05-28 RX ORDER — HYDROCODONE BITARTRATE AND ACETAMINOPHEN 5; 325 MG/1; MG/1
1-2 TABLET ORAL EVERY 4 HOURS PRN
Qty: 15 TABLET | Refills: 0 | Status: SHIPPED | OUTPATIENT
Start: 2019-05-28 | End: 2019-05-31

## 2019-05-28 RX ORDER — LIDOCAINE HYDROCHLORIDE 40 MG/ML
SOLUTION TOPICAL AS NEEDED
Status: DISCONTINUED | OUTPATIENT
Start: 2019-05-28 | End: 2019-05-28 | Stop reason: SURG

## 2019-05-28 RX ORDER — SODIUM CHLORIDE, SODIUM LACTATE, POTASSIUM CHLORIDE, CALCIUM CHLORIDE 600; 310; 30; 20 MG/100ML; MG/100ML; MG/100ML; MG/100ML
1000 INJECTION, SOLUTION INTRAVENOUS CONTINUOUS
Status: DISCONTINUED | OUTPATIENT
Start: 2019-05-28 | End: 2019-05-28 | Stop reason: HOSPADM

## 2019-05-28 RX ORDER — NALOXONE HCL 0.4 MG/ML
0.4 VIAL (ML) INJECTION AS NEEDED
Status: DISCONTINUED | OUTPATIENT
Start: 2019-05-28 | End: 2019-05-28 | Stop reason: HOSPADM

## 2019-05-28 RX ORDER — MIDAZOLAM HYDROCHLORIDE 1 MG/ML
1 INJECTION INTRAMUSCULAR; INTRAVENOUS
Status: DISCONTINUED | OUTPATIENT
Start: 2019-05-28 | End: 2019-05-28 | Stop reason: HOSPADM

## 2019-05-28 RX ORDER — ROCURONIUM BROMIDE 10 MG/ML
INJECTION, SOLUTION INTRAVENOUS AS NEEDED
Status: DISCONTINUED | OUTPATIENT
Start: 2019-05-28 | End: 2019-05-28 | Stop reason: SURG

## 2019-05-28 RX ORDER — ONDANSETRON 2 MG/ML
INJECTION INTRAMUSCULAR; INTRAVENOUS AS NEEDED
Status: DISCONTINUED | OUTPATIENT
Start: 2019-05-28 | End: 2019-05-28 | Stop reason: SURG

## 2019-05-28 RX ORDER — AMLODIPINE BESYLATE 5 MG/1
5 TABLET ORAL DAILY
COMMUNITY
End: 2021-04-08

## 2019-05-28 RX ORDER — ACETAMINOPHEN 500 MG
1000 TABLET ORAL ONCE
Status: COMPLETED | OUTPATIENT
Start: 2019-05-28 | End: 2019-05-28

## 2019-05-28 RX ORDER — DEXAMETHASONE SODIUM PHOSPHATE 4 MG/ML
4 INJECTION, SOLUTION INTRA-ARTICULAR; INTRALESIONAL; INTRAMUSCULAR; INTRAVENOUS; SOFT TISSUE ONCE AS NEEDED
Status: COMPLETED | OUTPATIENT
Start: 2019-05-28 | End: 2019-05-28

## 2019-05-28 RX ORDER — SUCCINYLCHOLINE CHLORIDE 20 MG/ML
INJECTION INTRAMUSCULAR; INTRAVENOUS AS NEEDED
Status: DISCONTINUED | OUTPATIENT
Start: 2019-05-28 | End: 2019-05-28 | Stop reason: SURG

## 2019-05-28 RX ORDER — HYDROCODONE BITARTRATE AND ACETAMINOPHEN 7.5; 325 MG/1; MG/1
1 TABLET ORAL EVERY 4 HOURS PRN
Qty: 15 TABLET | Refills: 0 | Status: SHIPPED | OUTPATIENT
Start: 2019-05-28 | End: 2019-07-29

## 2019-05-28 RX ORDER — FLUOXETINE HYDROCHLORIDE 20 MG/1
20 CAPSULE ORAL DAILY
Status: ON HOLD | COMMUNITY
End: 2019-05-28

## 2019-05-28 RX ORDER — SODIUM CHLORIDE, SODIUM LACTATE, POTASSIUM CHLORIDE, CALCIUM CHLORIDE 600; 310; 30; 20 MG/100ML; MG/100ML; MG/100ML; MG/100ML
100 INJECTION, SOLUTION INTRAVENOUS CONTINUOUS
Status: DISCONTINUED | OUTPATIENT
Start: 2019-05-28 | End: 2019-05-28 | Stop reason: HOSPADM

## 2019-05-28 RX ORDER — LISINOPRIL 10 MG/1
40 TABLET ORAL DAILY
COMMUNITY
End: 2020-06-18 | Stop reason: SDUPTHER

## 2019-05-28 RX ORDER — ARIPIPRAZOLE 5 MG/1
5 TABLET ORAL DAILY
COMMUNITY
End: 2021-04-08

## 2019-05-28 RX ORDER — DULOXETIN HYDROCHLORIDE 60 MG/1
60 CAPSULE, DELAYED RELEASE ORAL DAILY
COMMUNITY
End: 2021-04-02 | Stop reason: SDUPTHER

## 2019-05-28 RX ORDER — ONDANSETRON 2 MG/ML
4 INJECTION INTRAMUSCULAR; INTRAVENOUS ONCE AS NEEDED
Status: DISCONTINUED | OUTPATIENT
Start: 2019-05-28 | End: 2019-05-28 | Stop reason: HOSPADM

## 2019-05-28 RX ORDER — CETIRIZINE HYDROCHLORIDE 10 MG/1
10 TABLET ORAL DAILY
Status: ON HOLD | COMMUNITY
End: 2019-05-28

## 2019-05-28 RX ORDER — IPRATROPIUM BROMIDE AND ALBUTEROL SULFATE 2.5; .5 MG/3ML; MG/3ML
3 SOLUTION RESPIRATORY (INHALATION) ONCE AS NEEDED
Status: DISCONTINUED | OUTPATIENT
Start: 2019-05-28 | End: 2019-05-28 | Stop reason: HOSPADM

## 2019-05-28 RX ORDER — DEXAMETHASONE SODIUM PHOSPHATE 4 MG/ML
INJECTION, SOLUTION INTRA-ARTICULAR; INTRALESIONAL; INTRAMUSCULAR; INTRAVENOUS; SOFT TISSUE AS NEEDED
Status: DISCONTINUED | OUTPATIENT
Start: 2019-05-28 | End: 2019-05-28 | Stop reason: SURG

## 2019-05-28 RX ORDER — IBUPROFEN 600 MG/1
600 TABLET ORAL ONCE AS NEEDED
Status: DISCONTINUED | OUTPATIENT
Start: 2019-05-28 | End: 2019-05-28 | Stop reason: HOSPADM

## 2019-05-28 RX ORDER — BACITRACIN ZINC 500 [USP'U]/G
OINTMENT TOPICAL AS NEEDED
Status: DISCONTINUED | OUTPATIENT
Start: 2019-05-28 | End: 2019-05-28 | Stop reason: HOSPADM

## 2019-05-28 RX ORDER — CHLORAL HYDRATE 500 MG
1000 CAPSULE ORAL
COMMUNITY

## 2019-05-28 RX ORDER — LIDOCAINE HYDROCHLORIDE AND EPINEPHRINE 10; 10 MG/ML; UG/ML
INJECTION, SOLUTION INFILTRATION; PERINEURAL AS NEEDED
Status: DISCONTINUED | OUTPATIENT
Start: 2019-05-28 | End: 2019-05-28 | Stop reason: HOSPADM

## 2019-05-28 RX ORDER — LABETALOL HYDROCHLORIDE 5 MG/ML
5 INJECTION, SOLUTION INTRAVENOUS
Status: DISCONTINUED | OUTPATIENT
Start: 2019-05-28 | End: 2019-05-28 | Stop reason: HOSPADM

## 2019-05-28 RX ORDER — MIDAZOLAM HYDROCHLORIDE 1 MG/ML
2 INJECTION INTRAMUSCULAR; INTRAVENOUS
Status: DISCONTINUED | OUTPATIENT
Start: 2019-05-28 | End: 2019-05-28 | Stop reason: HOSPADM

## 2019-05-28 RX ORDER — OXYCODONE AND ACETAMINOPHEN 10; 325 MG/1; MG/1
1 TABLET ORAL ONCE AS NEEDED
Status: DISCONTINUED | OUTPATIENT
Start: 2019-05-28 | End: 2019-05-28 | Stop reason: HOSPADM

## 2019-05-28 RX ORDER — OXYCODONE AND ACETAMINOPHEN 7.5; 325 MG/1; MG/1
2 TABLET ORAL EVERY 4 HOURS PRN
Status: DISCONTINUED | OUTPATIENT
Start: 2019-05-28 | End: 2019-05-28 | Stop reason: HOSPADM

## 2019-05-28 RX ORDER — SODIUM CHLORIDE 0.9 % (FLUSH) 0.9 %
3 SYRINGE (ML) INJECTION AS NEEDED
Status: DISCONTINUED | OUTPATIENT
Start: 2019-05-28 | End: 2019-05-28 | Stop reason: HOSPADM

## 2019-05-28 RX ORDER — OXYCODONE AND ACETAMINOPHEN 7.5; 325 MG/1; MG/1
1 TABLET ORAL ONCE AS NEEDED
Status: DISCONTINUED | OUTPATIENT
Start: 2019-05-28 | End: 2019-05-28 | Stop reason: HOSPADM

## 2019-05-28 RX ORDER — CEFAZOLIN SODIUM 1 G/3ML
INJECTION, POWDER, FOR SOLUTION INTRAMUSCULAR; INTRAVENOUS AS NEEDED
Status: DISCONTINUED | OUTPATIENT
Start: 2019-05-28 | End: 2019-05-28 | Stop reason: SURG

## 2019-05-28 RX ORDER — PROPOFOL 10 MG/ML
VIAL (ML) INTRAVENOUS AS NEEDED
Status: DISCONTINUED | OUTPATIENT
Start: 2019-05-28 | End: 2019-05-28 | Stop reason: SURG

## 2019-05-28 RX ORDER — METOCLOPRAMIDE HYDROCHLORIDE 5 MG/ML
5 INJECTION INTRAMUSCULAR; INTRAVENOUS
Status: DISCONTINUED | OUTPATIENT
Start: 2019-05-28 | End: 2019-05-28 | Stop reason: HOSPADM

## 2019-05-28 RX ORDER — LIDOCAINE HYDROCHLORIDE 20 MG/ML
INJECTION, SOLUTION INFILTRATION; PERINEURAL AS NEEDED
Status: DISCONTINUED | OUTPATIENT
Start: 2019-05-28 | End: 2019-05-28 | Stop reason: SURG

## 2019-05-28 RX ORDER — SODIUM CHLORIDE 0.9 % (FLUSH) 0.9 %
3 SYRINGE (ML) INJECTION EVERY 12 HOURS SCHEDULED
Status: DISCONTINUED | OUTPATIENT
Start: 2019-05-28 | End: 2019-05-28 | Stop reason: HOSPADM

## 2019-05-28 RX ORDER — BACITRACIN ZINC AND POLYMYXIN B SULFATE 500; 1000 [USP'U]/G; [USP'U]/G
OINTMENT TOPICAL 2 TIMES DAILY
Qty: 14.2 G | Refills: 0 | Status: SHIPPED | OUTPATIENT
Start: 2019-05-28 | End: 2019-06-25 | Stop reason: SDUPTHER

## 2019-05-28 RX ORDER — AMOXICILLIN AND CLAVULANATE POTASSIUM 875; 125 MG/1; MG/1
1 TABLET, FILM COATED ORAL EVERY 12 HOURS
Qty: 20 TABLET | Refills: 0 | Status: SHIPPED | OUTPATIENT
Start: 2019-05-28 | End: 2019-06-07

## 2019-05-28 RX ORDER — FENTANYL CITRATE 50 UG/ML
INJECTION, SOLUTION INTRAMUSCULAR; INTRAVENOUS AS NEEDED
Status: DISCONTINUED | OUTPATIENT
Start: 2019-05-28 | End: 2019-05-28 | Stop reason: SURG

## 2019-05-28 RX ORDER — FENTANYL CITRATE 50 UG/ML
25 INJECTION, SOLUTION INTRAMUSCULAR; INTRAVENOUS AS NEEDED
Status: COMPLETED | OUTPATIENT
Start: 2019-05-28 | End: 2019-05-28

## 2019-05-28 RX ADMIN — FENTANYL CITRATE 25 MCG: 50 INJECTION INTRAMUSCULAR; INTRAVENOUS at 12:48

## 2019-05-28 RX ADMIN — ONDANSETRON HYDROCHLORIDE 4 MG: 2 SOLUTION INTRAMUSCULAR; INTRAVENOUS at 11:37

## 2019-05-28 RX ADMIN — FENTANYL CITRATE 25 MCG: 50 INJECTION INTRAMUSCULAR; INTRAVENOUS at 12:43

## 2019-05-28 RX ADMIN — DEXAMETHASONE SODIUM PHOSPHATE 4 MG: 4 INJECTION, SOLUTION INTRAMUSCULAR; INTRAVENOUS at 10:30

## 2019-05-28 RX ADMIN — LIDOCAINE HYDROCHLORIDE 100 MG: 20 INJECTION, SOLUTION INFILTRATION; PERINEURAL at 10:49

## 2019-05-28 RX ADMIN — PROPOFOL 200 MG: 10 INJECTION, EMULSION INTRAVENOUS at 10:49

## 2019-05-28 RX ADMIN — ACETAMINOPHEN 1000 MG: 500 TABLET, FILM COATED ORAL at 10:30

## 2019-05-28 RX ADMIN — OXYCODONE HYDROCHLORIDE AND ACETAMINOPHEN 2 TABLET: 7.5; 325 TABLET ORAL at 12:43

## 2019-05-28 RX ADMIN — SUCCINYLCHOLINE CHLORIDE 200 MG: 20 INJECTION, SOLUTION INTRAMUSCULAR; INTRAVENOUS at 10:49

## 2019-05-28 RX ADMIN — DEXAMETHASONE SODIUM PHOSPHATE 4 MG: 4 INJECTION, SOLUTION INTRAMUSCULAR; INTRAVENOUS at 11:37

## 2019-05-28 RX ADMIN — MIDAZOLAM 2 MG: 1 INJECTION INTRAMUSCULAR; INTRAVENOUS at 10:30

## 2019-05-28 RX ADMIN — LIDOCAINE HYDROCHLORIDE 1 EACH: 40 SOLUTION TOPICAL at 10:49

## 2019-05-28 RX ADMIN — ROCURONIUM BROMIDE 10 MG: 10 INJECTION INTRAVENOUS at 10:49

## 2019-05-28 RX ADMIN — CEFAZOLIN 2 G: 1 INJECTION, POWDER, FOR SOLUTION INTRAVENOUS at 11:03

## 2019-05-28 RX ADMIN — FENTANYL CITRATE 25 MCG: 50 INJECTION INTRAMUSCULAR; INTRAVENOUS at 12:50

## 2019-05-28 RX ADMIN — FENTANYL CITRATE 25 MCG: 50 INJECTION INTRAMUSCULAR; INTRAVENOUS at 12:45

## 2019-05-28 RX ADMIN — FENTANYL CITRATE 100 MCG: 50 INJECTION, SOLUTION INTRAMUSCULAR; INTRAVENOUS at 10:49

## 2019-05-28 RX ADMIN — FENTANYL CITRATE 100 MCG: 50 INJECTION, SOLUTION INTRAMUSCULAR; INTRAVENOUS at 11:37

## 2019-05-28 RX ADMIN — SODIUM CHLORIDE, POTASSIUM CHLORIDE, SODIUM LACTATE AND CALCIUM CHLORIDE 1000 ML: 600; 310; 30; 20 INJECTION, SOLUTION INTRAVENOUS at 09:54

## 2019-05-28 RX ADMIN — ONDANSETRON 4 MG: 2 INJECTION INTRAMUSCULAR; INTRAVENOUS at 13:04

## 2019-05-28 NOTE — ANESTHESIA PREPROCEDURE EVALUATION
Anesthesia Evaluation     Patient summary reviewed   no history of anesthetic complications:  NPO Solid Status: > 8 hours  NPO Liquid Status: > 4 hours           Airway   Mallampati: III  TM distance: >3 FB  Neck ROM: limited  Comment: Neck extension and mouth opening limited 2/2 pain and pulling of wound  Dental          Pulmonary - normal exam    breath sounds clear to auscultation  (+) a smoker (1 ppd) Current Smoked day of surgery,   (-) asthma, recent URI, sleep apnea  Cardiovascular - normal exam  Exercise tolerance: good (4-7 METS)    ECG reviewed  Rhythm: regular  Rate: normal    (+) hypertension, hyperlipidemia,   (-) pacemaker, past MI, angina, cardiac stents, CABG      Neuro/Psych  (+) psychiatric history Anxiety and Depression,     (-) seizures, TIA, CVA  GI/Hepatic/Renal/Endo    (-) GERD, liver disease, no renal disease, diabetes, hypothyroidism, hyperthyroidism    Musculoskeletal     Abdominal    Substance History      OB/GYN          Other                        Anesthesia Plan    ASA 2     general     intravenous induction   Anesthetic plan, all risks, benefits, and alternatives have been provided, discussed and informed consent has been obtained with: patient.

## 2019-05-28 NOTE — ANESTHESIA PROCEDURE NOTES
Airway  Urgency: elective    Airway not difficult    General Information and Staff    Patient location during procedure: OR  CRNA: Jasmyn Broussard CRNA    Indications and Patient Condition  Indications for airway management: airway protection    Preoxygenated: yes  MILS maintained throughout  Mask difficulty assessment: 1 - vent by mask    Final Airway Details  Final airway type: endotracheal airway      Successful airway: ETT  Cuffed: yes   Successful intubation technique: direct laryngoscopy  Facilitating devices/methods: intubating stylet  Endotracheal tube insertion site: oral  Blade: Mc  Blade size: 2  ETT size (mm): 7.5  Cormack-Lehane Classification: grade I - full view of glottis  Placement verified by: chest auscultation, capnometry and palpation of cuff   Cuff volume (mL): 6  Measured from: teeth  Number of attempts at approach: 1

## 2019-05-28 NOTE — ANESTHESIA POSTPROCEDURE EVALUATION
"Patient: Raimundo Ramos    Procedure Summary     Date:  05/28/19 Room / Location:  [facility] OR  /  [facility] OR    Anesthesia Start:  1046 Anesthesia Stop:  1229    Procedure:  Complex closure of open scalp wound avulsion defect 7x 4.5 cm with rotation/ advancement flap closure 9cm x 5 cm (Right ) Diagnosis:       Scalp avulsion      (Scalp avulsion [S08.0XXA])    Surgeon:  Fam Cardoso MD Provider:  Jasmyn Broussard CRNA    Anesthesia Type:  general ASA Status:  2          Anesthesia Type: general  Last vitals  BP   127/88 (05/28/19 1358)   Temp   98.9 °F (37.2 °C) (05/28/19 1310)   Pulse   86 (05/28/19 1358)   Resp   16 (05/28/19 1358)     SpO2   95 % (05/28/19 1358)     Post Anesthesia Care and Evaluation    PONV Status: none  Comments: Patient d/c from PACU prior to anes eval based on Desiree score.  Please see RN notes for details of d/c criteria.    Blood pressure 127/88, pulse 86, temperature 98.9 °F (37.2 °C), temperature source Temporal, resp. rate 16, height 180 cm (70.87\"), weight 94.7 kg (208 lb 12.4 oz), SpO2 95 %.          "

## 2019-05-29 LAB
BACTERIA SPEC AEROBE CULT: ABNORMAL
GRAM STN SPEC: ABNORMAL
ISOLATED FROM: ABNORMAL

## 2019-05-30 ENCOUNTER — OFFICE VISIT (OUTPATIENT)
Dept: PRIMARY CARE CLINIC | Age: 54
End: 2019-05-30
Payer: MEDICAID

## 2019-05-30 VITALS
HEART RATE: 80 BPM | DIASTOLIC BLOOD PRESSURE: 77 MMHG | WEIGHT: 212 LBS | HEIGHT: 72 IN | OXYGEN SATURATION: 98 % | BODY MASS INDEX: 28.71 KG/M2 | SYSTOLIC BLOOD PRESSURE: 124 MMHG | TEMPERATURE: 96.6 F
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
## 2019-05-30 DIAGNOSIS — F43.10 PTSD (POST-TRAUMATIC STRESS DISORDER): ICD-10-CM

## 2019-05-30 PROCEDURE — 4004F PT TOBACCO SCREEN RCVD TLK: CPT | Performed by: NURSE PRACTITIONER

## 2019-05-30 PROCEDURE — G8427 DOCREV CUR MEDS BY ELIG CLIN: HCPCS | Performed by: NURSE PRACTITIONER

## 2019-05-30 PROCEDURE — G8417 CALC BMI ABV UP PARAM F/U: HCPCS | Performed by: NURSE PRACTITIONER

## 2019-05-30 PROCEDURE — 99214 OFFICE O/P EST MOD 30 MIN: CPT | Performed by: NURSE PRACTITIONER

## 2019-05-30 PROCEDURE — 1111F DSCHRG MED/CURRENT MED MERGE: CPT | Performed by: NURSE PRACTITIONER

## 2019-05-30 PROCEDURE — 3017F COLORECTAL CA SCREEN DOC REV: CPT | Performed by: NURSE PRACTITIONER

## 2019-05-30 RX ORDER — PRAZOSIN HYDROCHLORIDE 1 MG/1
1 CAPSULE ORAL NIGHTLY
Qty: 30 CAPSULE | Refills: 1 | Status: SHIPPED | OUTPATIENT
Start: 2019-05-30 | End: 2019-07-21 | Stop reason: SDUPTHER

## 2019-05-30 RX ORDER — AMOXICILLIN AND CLAVULANATE POTASSIUM 875; 125 MG/1; MG/1
1 TABLET, FILM COATED ORAL 2 TIMES DAILY
COMMUNITY
Start: 2019-05-28 | End: 2019-06-07

## 2019-05-30 ASSESSMENT — ENCOUNTER SYMPTOMS
DIARRHEA: 0
SHORTNESS OF BREATH: 0
VOMITING: 0
TROUBLE SWALLOWING: 0
ABDOMINAL PAIN: 0
COUGH: 0
NAUSEA: 0
CONSTIPATION: 0
SORE THROAT: 0
RHINORRHEA: 0
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
## 2019-05-30 NOTE — PATIENT INSTRUCTIONS
Follow up with Dr Samm Pires as scheduled for suture/staple removal.   Finish antibiotics  Return for any signs of infection

## 2019-06-01 LAB — BACTERIA SPEC AEROBE CULT: NORMAL

## 2019-06-05 ENCOUNTER — TELEPHONE (OUTPATIENT)
Dept: OTOLARYNGOLOGY | Facility: CLINIC | Age: 54
End: 2019-06-05

## 2019-06-05 RX ORDER — OXYCODONE HYDROCHLORIDE AND ACETAMINOPHEN 5; 325 MG/1; MG/1
1 TABLET ORAL EVERY 4 HOURS PRN
Qty: 15 TABLET | Refills: 0 | Status: SHIPPED | OUTPATIENT
Start: 2019-06-05 | End: 2019-06-08

## 2019-06-05 NOTE — TELEPHONE ENCOUNTER
New Medications Ordered This Visit   Medications   • oxyCODONE-acetaminophen (PERCOCET) 5-325 MG per tablet     Sig: Take 1 tablet by mouth Every 4 (Four) Hours As Needed for Moderate Pain  or Severe Pain  for up to 3 days.     Dispense:  15 tablet     Refill:  0     Convert to tylenol and Motrin,   Follow up as scheduled unless fever, drainage or breakdown of wound    Fam Cardoso MD  06/05/19  12:14 PM

## 2019-06-05 NOTE — TELEPHONE ENCOUNTER
Patient called with complaints of pain from surgical site.  He has been taking tylenol and motrin without significant relief.  He is requesting refill of pain medication.

## 2019-06-10 ENCOUNTER — OFFICE VISIT (OUTPATIENT)
Dept: OTOLARYNGOLOGY | Facility: CLINIC | Age: 54
End: 2019-06-10

## 2019-06-10 DIAGNOSIS — S08.0XXS AVULSION OF SCALP, SEQUELA: Primary | ICD-10-CM

## 2019-06-10 PROCEDURE — 99024 POSTOP FOLLOW-UP VISIT: CPT | Performed by: OTOLARYNGOLOGY

## 2019-06-10 NOTE — PROGRESS NOTES
Procedure   Suture Removal  Date/Time: 6/10/2019 10:37 AM  Performed by: Catarina Lim RN  Authorized by: Fam Cardoso MD   Consent: Verbal consent obtained.  Consent given by: patient  Patient identity confirmed: verbally with patient  Body area: head/neck  Location details: scalp  Comments: Patient presents for staple removal. The scalp is intact with moderate scabbing. There is no redness or swelling. Patient instructed on wound care with vaseline gauze to help wound healing

## 2019-06-19 ENCOUNTER — OFFICE VISIT (OUTPATIENT)
Dept: PRIMARY CARE CLINIC | Age: 54
End: 2019-06-19
Payer: MEDICAID

## 2019-06-19 VITALS
DIASTOLIC BLOOD PRESSURE: 78 MMHG | HEART RATE: 83 BPM | TEMPERATURE: 98.1 F | SYSTOLIC BLOOD PRESSURE: 131 MMHG | WEIGHT: 218 LBS | OXYGEN SATURATION: 98 % | BODY MASS INDEX: 29.57 KG/M2

## 2019-06-19 DIAGNOSIS — S01.05XD: Primary | ICD-10-CM

## 2019-06-19 PROCEDURE — G8417 CALC BMI ABV UP PARAM F/U: HCPCS | Performed by: NURSE PRACTITIONER

## 2019-06-19 PROCEDURE — G8427 DOCREV CUR MEDS BY ELIG CLIN: HCPCS | Performed by: NURSE PRACTITIONER

## 2019-06-19 PROCEDURE — 4004F PT TOBACCO SCREEN RCVD TLK: CPT | Performed by: NURSE PRACTITIONER

## 2019-06-19 PROCEDURE — 3017F COLORECTAL CA SCREEN DOC REV: CPT | Performed by: NURSE PRACTITIONER

## 2019-06-19 PROCEDURE — 99213 OFFICE O/P EST LOW 20 MIN: CPT | Performed by: NURSE PRACTITIONER

## 2019-06-19 NOTE — PROGRESS NOTES
[address], [address]  Phone [phone]   Fax [phone]      OFFICE VISIT: 6/19/2019    Cece Melgoza is a 47 y.o. male whopresents today for his medical conditions/complaints as noted below. Cece Melgoza isc/o of Wound Check (here for follow up on head wound from dog bite. )        :     HPI  Here for wound recheck  He is concerned about scalp wound from dog bite that occurred Memorial day. No fever or drainage. States that his head has been numb since incident. He has been using antibiotic duane every other day. Past Medical History:   Diagnosis Date    Chronic back pain     Chronic neck pain     Gout     Hypertension       No past surgical history on file. Family History   Problem Relation Age of Onset    High Blood Pressure Mother     Arthritis Mother     Diabetes Mother     High Blood Pressure Father     Arthritis Father        Social History     Tobacco Use    Smoking status: Current Every Day Smoker     Packs/day: 0.50     Years: 37.00     Pack years: 18.50     Types: Cigarettes     Start date: 1981    Smokeless tobacco: Never Used   Substance Use Topics    Alcohol use: No     Comment: Quit 2 years ago 2016      Current Outpatient Medications   Medication Sig Dispense Refill    prazosin (MINIPRESS) 1 MG capsule Take 1 capsule by mouth nightly 30 capsule 1    busPIRone (BUSPAR) 7.5 MG tablet Take 1 tablet by mouth 2 times daily 60 tablet 11    ibuprofen (ADVIL;MOTRIN) 800 MG tablet Take 1 tablet by mouth every 6 hours as needed for Pain 120 tablet 3    amitriptyline (ELAVIL) 25 MG tablet Take 1-4 tablets at bedtime for sleep and radicular pain 75 tablet 2    gabapentin (NEURONTIN) 300 MG capsule Take 3 capsules by mouth 3 times daily. . 270 capsule 2    Cream Base CREA [place] Pain Cream #4 Add Gabapentin 6% Apply 1-2 pumps to affected area 3-4 times a day 300 g 5    Multiple Vitamins-Minerals (THERAPEUTIC MULTIVITAMIN-MINERALS) tablet Take 1 tablet by mouth daily      Omega-3 Fatty Acids (FISH OIL) 1000 MG CAPS Take 1,000 mg by mouth daily      loratadine (CLARITIN) 10 MG tablet Take 1 tablet by mouth daily 30 tablet 11    lisinopril (PRINIVIL;ZESTRIL) 40 MG tablet Take 1 tablet by mouth daily 30 tablet 11    ARIPiprazole (ABILIFY) 5 MG tablet Take 1 tablet by mouth daily 30 tablet 11    DULoxetine (CYMBALTA) 60 MG extended release capsule Take 1 capsule by mouth daily 30 capsule 11    amLODIPine (NORVASC) 5 MG tablet Take 1 tablet by mouth daily 30 tablet 11    fluticasone (FLONASE) 50 MCG/ACT nasal spray 2 sprays by Nasal route daily 1 Bottle 5    baclofen (LIORESAL) 10 MG tablet Take 10 mg by mouth 3 times daily        No current facility-administered medications for this visit. Allergies   Allergen Reactions    Meloxicam     Oxazepam        Health Maintenance   Topic Date Due    Pneumococcal 0-64 years Vaccine (1 of 1 - PPSV23) 08/17/1972    HIV screen  08/17/1981    DTaP/Tdap/Td vaccine (1 - Tdap) 08/17/1985    Shingles Vaccine (1 of 2) 08/17/2016    Colon cancer screen colonoscopy  08/17/2016    Potassium monitoring  08/23/2018    Creatinine monitoring  08/23/2018    Flu vaccine (Season Ended) 09/01/2019    Lipid screen  12/20/2021        :     Review of Systems   All other systems reviewed and are negative.      :     Physical Exam   Constitutional: He appears well-developed and well-nourished. HENT:   Head: Normocephalic. Neck: Normal range of motion. Neck supple. Cardiovascular: Normal rate, regular rhythm, normal heart sounds and intact distal pulses. Pulmonary/Chest: Effort normal and breath sounds normal.     /78 (Site: Left Upper Arm, Position: Sitting, Cuff Size: Large Adult)   Pulse 83   Temp 98.1 °F (36.7 °C) (Temporal)   Wt 218 lb (98.9 kg)   SpO2 98%   BMI 29.57 kg/m²     :        ICD-10-CM    1. Open bite of scalp, subsequent encounter S01. 05XD        :      Return if symptoms worsen or fail to improve. No orders of the defined types were placed in this encounter. No orders of the defined types were placed in this encounter. Discussed use, benefit, and side effectsof prescribed medications. All patient questions answered. Pt voiced understanding. Reviewed health maintenance. .  Patient agreed with treatment plan. Follow up asdirected. There are no Patient Instructions on file for this visit. No flowsheet data found.     Electronically signed by CONSTANTINO Sethi on6/19/2019 at 11:03 AM

## 2019-06-24 ENCOUNTER — OFFICE VISIT (OUTPATIENT)
Dept: OTOLARYNGOLOGY | Facility: CLINIC | Age: 54
End: 2019-06-24

## 2019-06-24 DIAGNOSIS — S08.0XXS AVULSION OF SCALP, SEQUELA: Primary | ICD-10-CM

## 2019-06-24 PROCEDURE — 99024 POSTOP FOLLOW-UP VISIT: CPT | Performed by: OTOLARYNGOLOGY

## 2019-06-24 NOTE — PROGRESS NOTES
Procedure   Wound Assessment  Date/Time: 6/24/2019 9:58 AM  Performed by: Catarina Lim RN  Authorized by: Fam Cardoso MD   Consent: Verbal consent obtained.  Consent given by: patient  Patient identity confirmed: verbally with patient  Comments: Patient presents for wound check. Patient has moderate scabbing which has improved since the time of his staple removal. He still has numbness of scalp and lip. He has been using ointment and vaseline gauze and will continue to do so. He has no signs of infection. Patient will follow up with Dr. Cardoso and call if any problems.

## 2019-06-25 RX ORDER — BACITRACIN ZINC, POLYMYXIN B SULFATE 500; 10000 [USP'U]/G; [USP'U]/G
OINTMENT TOPICAL
Qty: 28 G | Refills: 0 | Status: SHIPPED | OUTPATIENT
Start: 2019-06-25 | End: 2021-06-10
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
## 2019-07-21 DIAGNOSIS — F43.10 PTSD (POST-TRAUMATIC STRESS DISORDER): ICD-10-CM
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
## 2019-07-22 RX ORDER — PRAZOSIN HYDROCHLORIDE 1 MG/1
CAPSULE ORAL
Qty: 30 CAPSULE | Refills: 1 | Status: SHIPPED | OUTPATIENT
Start: 2019-07-22 | End: 2019-09-13 | Stop reason: SDUPTHER

## 2019-07-29 ENCOUNTER — OFFICE VISIT (OUTPATIENT)
Dept: OTOLARYNGOLOGY | Facility: CLINIC | Age: 54
End: 2019-07-29

## 2019-07-29 VITALS
WEIGHT: 230 LBS | SYSTOLIC BLOOD PRESSURE: 125 MMHG | TEMPERATURE: 98.8 F | BODY MASS INDEX: 32.2 KG/M2 | HEART RATE: 91 BPM | HEIGHT: 71 IN | DIASTOLIC BLOOD PRESSURE: 48 MMHG

## 2019-07-29 DIAGNOSIS — S08.0XXS AVULSION OF SCALP, SEQUELA: Primary | ICD-10-CM

## 2019-07-29 PROCEDURE — 99024 POSTOP FOLLOW-UP VISIT: CPT | Performed by: NURSE PRACTITIONER

## 2019-07-29 NOTE — PROGRESS NOTES
"Raimundo Ramos returns to the office for a routine postoperative visit following complex closure of open scalp wound avulsion defect with rotation/advancement flap closure on 5/28/19.    SUBJECTIVE:  Since surgery, he has been doing relatively well. He denies pain, fever, chills, bleeding, drainage.     OBJECTIVE:  /48   Pulse 91   Temp 98.8 °F (37.1 °C)   Ht 180 cm (70.87\")   Wt 104 kg (230 lb)   BMI 32.20 kg/m²   Incisions are well healed. There is large area approximately 5 cm to the right parietal scalp with pink, healthy granulation tissue in the wound bed. Some crusting was debrided around the peripheral edges of the wound bed. No purulent drainage noted.     ASSESSMENT:  Raimundo was seen today for follow-up.    Diagnoses and all orders for this visit:    Avulsion of scalp, sequela    Other orders  -     mupirocin (BACTROBAN) 2 % ointment; Apply  topically to the appropriate area as directed 3 (Three) Times a Day for 7 days.      PLAN:     Clean wound with gentle soap and water BID. Apply Mupirocin QID. Call for any problems.  Follow-up in 2 weeks.       Alia Mendieta, PEPITO   07/29/2019  9:57 AM      "

## 2019-08-05 DIAGNOSIS — G89.29 CHRONIC LEFT-SIDED LOW BACK PAIN WITH LEFT-SIDED SCIATICA: ICD-10-CM

## 2019-08-05 DIAGNOSIS — M54.42 CHRONIC LEFT-SIDED LOW BACK PAIN WITH LEFT-SIDED SCIATICA: ICD-10-CM

## 2019-08-06 RX ORDER — IBUPROFEN 800 MG/1
800 TABLET ORAL EVERY 6 HOURS PRN
Qty: 120 TABLET | Refills: 3 | Status: SHIPPED | OUTPATIENT
Start: 2019-08-06 | End: 2019-11-25 | Stop reason: SDUPTHER

## 2019-08-13 ENCOUNTER — OFFICE VISIT (OUTPATIENT)
Dept: PRIMARY CARE CLINIC | Age: 54
End: 2019-08-13
Payer: MEDICAID

## 2019-08-13 VITALS
DIASTOLIC BLOOD PRESSURE: 78 MMHG | SYSTOLIC BLOOD PRESSURE: 134 MMHG | HEART RATE: 74 BPM | WEIGHT: 224 LBS | TEMPERATURE: 98.4 F | HEIGHT: 72 IN | OXYGEN SATURATION: 95 % | BODY MASS INDEX: 30.34 KG/M2

## 2019-08-13 DIAGNOSIS — J01.00 ACUTE MAXILLARY SINUSITIS, RECURRENCE NOT SPECIFIED: Primary | ICD-10-CM

## 2019-08-13 PROCEDURE — G8417 CALC BMI ABV UP PARAM F/U: HCPCS | Performed by: NURSE PRACTITIONER

## 2019-08-13 PROCEDURE — 4004F PT TOBACCO SCREEN RCVD TLK: CPT | Performed by: NURSE PRACTITIONER

## 2019-08-13 PROCEDURE — 99213 OFFICE O/P EST LOW 20 MIN: CPT | Performed by: NURSE PRACTITIONER

## 2019-08-13 PROCEDURE — 3017F COLORECTAL CA SCREEN DOC REV: CPT | Performed by: NURSE PRACTITIONER

## 2019-08-13 PROCEDURE — G8427 DOCREV CUR MEDS BY ELIG CLIN: HCPCS | Performed by: NURSE PRACTITIONER

## 2019-08-13 RX ORDER — AMOXICILLIN AND CLAVULANATE POTASSIUM 875; 125 MG/1; MG/1
1 TABLET, FILM COATED ORAL 2 TIMES DAILY
Qty: 20 TABLET | Refills: 0 | Status: SHIPPED | OUTPATIENT
Start: 2019-08-13 | End: 2019-08-23

## 2019-08-13 ASSESSMENT — ENCOUNTER SYMPTOMS
NAUSEA: 0
VOMITING: 0
CONSTIPATION: 0
ABDOMINAL PAIN: 0
COUGH: 1
RHINORRHEA: 0
SORE THROAT: 1
TROUBLE SWALLOWING: 0
DIARRHEA: 0
SHORTNESS OF BREATH: 0

## 2019-08-27 ENCOUNTER — OFFICE VISIT (OUTPATIENT)
Dept: PRIMARY CARE CLINIC | Age: 54
End: 2019-08-27
Payer: MEDICAID

## 2019-08-27 VITALS
SYSTOLIC BLOOD PRESSURE: 148 MMHG | WEIGHT: 236 LBS | BODY MASS INDEX: 31.97 KG/M2 | HEIGHT: 72 IN | TEMPERATURE: 98.1 F | DIASTOLIC BLOOD PRESSURE: 74 MMHG | OXYGEN SATURATION: 96 % | HEART RATE: 93 BPM

## 2019-08-27 DIAGNOSIS — B02.9 HERPES ZOSTER WITHOUT COMPLICATION: Primary | ICD-10-CM

## 2019-08-27 PROCEDURE — 3017F COLORECTAL CA SCREEN DOC REV: CPT | Performed by: NURSE PRACTITIONER

## 2019-08-27 PROCEDURE — 4004F PT TOBACCO SCREEN RCVD TLK: CPT | Performed by: NURSE PRACTITIONER

## 2019-08-27 PROCEDURE — 99213 OFFICE O/P EST LOW 20 MIN: CPT | Performed by: NURSE PRACTITIONER

## 2019-08-27 PROCEDURE — G8417 CALC BMI ABV UP PARAM F/U: HCPCS | Performed by: NURSE PRACTITIONER

## 2019-08-27 PROCEDURE — G8427 DOCREV CUR MEDS BY ELIG CLIN: HCPCS | Performed by: NURSE PRACTITIONER

## 2019-08-27 RX ORDER — MIRTAZAPINE 30 MG/1
TABLET, FILM COATED ORAL
Refills: 1 | COMMUNITY
Start: 2019-08-06 | End: 2019-12-13

## 2019-08-27 RX ORDER — BUSPIRONE HYDROCHLORIDE 30 MG/1
TABLET ORAL
Refills: 1 | COMMUNITY
Start: 2019-08-07 | End: 2019-12-13

## 2019-08-27 RX ORDER — VALACYCLOVIR HYDROCHLORIDE 1 G/1
1000 TABLET, FILM COATED ORAL 3 TIMES DAILY
Qty: 21 TABLET | Refills: 0 | Status: SHIPPED | OUTPATIENT
Start: 2019-08-27 | End: 2019-09-03

## 2019-08-27 RX ORDER — BUPRENORPHINE HYDROCHLORIDE AND NALOXONE HYDROCHLORIDE DIHYDRATE 8; 2 MG/1; MG/1
TABLET SUBLINGUAL
Refills: 0 | COMMUNITY
Start: 2019-08-19

## 2019-08-27 ASSESSMENT — ENCOUNTER SYMPTOMS
EYE REDNESS: 0
VOMITING: 0
COUGH: 0
CONSTIPATION: 0
SORE THROAT: 0
SHORTNESS OF BREATH: 0
RHINORRHEA: 0
DIARRHEA: 0

## 2019-08-27 NOTE — PATIENT INSTRUCTIONS
acetaminophen (Tylenol), ibuprofen (Advil, Motrin), or naproxen (Aleve). Read and follow all instructions on the label. · Avoid close contact with people until the blisters have healed. It is very important for you to avoid contact with anyone who has never had chickenpox or the chickenpox vaccine. Pregnant women, young babies, and anyone else who has a hard time fighting infection (such as someone with HIV, diabetes, or cancer) is especially at risk. When should you call for help? Call your doctor now or seek immediate medical care if:    · You have a new or higher fever.     · You have a severe headache and a stiff neck.     · You lose the ability to think clearly.     · The rash spreads to your forehead, nose, eyes, or eyelids.     · You have eye pain, or your vision gets worse.     · You have new pain in your face, or you cannot move the muscles in your face.     · Blisters spread to new parts of your body.    Watch closely for changes in your health, and be sure to contact your doctor if:    · The rash has not healed after 2 to 4 weeks.     · You still have pain after the rash has healed. Where can you learn more? Go to [email or web address] and sign in to your [employer] account. Shahram Butler in the [facility] box to learn more about \"Shingles: Care Instructions. \"     If you do not have an account, please click on the \"Sign Up Now\" link. Current as of: July 30, 2018  Content Version: 12.1  © 7160-3844 Healthwise, Incorporated. Care instructions adapted under license by [facility] [place] ([facility]). If you have questions about a medical condition or this instruction, always ask your healthcare professional. [address] any warranty or liability for your use of this information.

## 2019-08-27 NOTE — PROGRESS NOTES
Total Bilirubin 0.4 0.2 - 1.2 mg/dL    Alkaline Phosphatase 67 40 - 130 U/L    ALT 10 5 - 41 U/L    AST 16 5 - 40 U/L     have reviewed the following with the Mr. Juliette Montelongo   Lab Review   No visits with results within 6 Month(s) from this visit. Latest known visit with results is:   Orders Only on 08/23/2017   Component Date Value    WBC 08/23/2017 10.1     RBC 08/23/2017 4.00*    Hemoglobin 08/23/2017 12.7*    Hematocrit 08/23/2017 38.4*    MCV 08/23/2017 96.0*    MCH 08/23/2017 31.8*    MCHC 08/23/2017 33.1     RDW 08/23/2017 13.4     Platelets 01/11/1467 217     MPV 08/23/2017 10.4     Neutrophils % 08/23/2017 62.0     Lymphocytes % 08/23/2017 27.9     Monocytes % 08/23/2017 7.2     Eosinophils % 08/23/2017 2.1     Basophils % 08/23/2017 0.4     Neutrophils Absolute 08/23/2017 6.2     Lymphocytes Absolute 08/23/2017 2.8     Monocytes Absolute 08/23/2017 0.70     Eosinophils Absolute 08/23/2017 0.20     Basophils Absolute 08/23/2017 0.00     Sodium 08/23/2017 144     Potassium 08/23/2017 4.0     Chloride 08/23/2017 107     CO2 08/23/2017 21*    Anion Gap 08/23/2017 16     Glucose 08/23/2017 91     BUN 08/23/2017 14     CREATININE 08/23/2017 0.8     GFR Non- 08/23/2017 >60     Calcium 08/23/2017 8.8     Total Protein 08/23/2017 7.1     Alb 08/23/2017 3.7     Total Bilirubin 08/23/2017 0.4     Alkaline Phosphatase 08/23/2017 67     ALT 08/23/2017 10     AST 08/23/2017 16      Copies of these are in the chart. Prior to Visit Medications    Medication Sig Taking?  Authorizing Provider   buprenorphine-naloxone (SUBOXONE) 8-2 MG SUBL SL tablet TAKE 1 TABLET UNDER THE TONGUE FOR 2 DAYS, THEN TAKE 2 TABLETS UNDER THE TONGUE EVERY DAY Yes Historical Provider, MD   busPIRone (BUSPAR) 30 MG tablet TAKE 1 TABLET BY ORAL ROUTE 2 TIMES PER DAY Yes Historical Provider, MD   mirtazapine (REMERON) 30 MG tablet TAKE 1 TABLET BY MOUTH BEFORE BEDTIME Yes Historical Provider, MD
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
## 2019-09-13 DIAGNOSIS — F43.10 PTSD (POST-TRAUMATIC STRESS DISORDER): ICD-10-CM

## 2019-09-13 RX ORDER — PRAZOSIN HYDROCHLORIDE 1 MG/1
1 CAPSULE ORAL NIGHTLY
Qty: 30 CAPSULE | Refills: 5 | Status: SHIPPED | OUTPATIENT
Start: 2019-09-13 | End: 2019-12-13

## 2019-09-20 DIAGNOSIS — I10 ESSENTIAL HYPERTENSION: ICD-10-CM

## 2019-09-20 RX ORDER — AMLODIPINE BESYLATE 5 MG/1
5 TABLET ORAL DAILY
Qty: 90 TABLET | Refills: 3 | Status: SHIPPED | OUTPATIENT
Start: 2019-09-20 | End: 2020-08-25

## 2019-09-20 RX ORDER — LISINOPRIL 40 MG/1
40 TABLET ORAL DAILY
Qty: 90 TABLET | Refills: 3 | Status: SHIPPED | OUTPATIENT
Start: 2019-09-20

## 2019-11-25 DIAGNOSIS — M54.42 CHRONIC LEFT-SIDED LOW BACK PAIN WITH LEFT-SIDED SCIATICA: ICD-10-CM

## 2019-11-25 DIAGNOSIS — G89.29 CHRONIC LEFT-SIDED LOW BACK PAIN WITH LEFT-SIDED SCIATICA: ICD-10-CM

## 2019-11-26 RX ORDER — IBUPROFEN 800 MG/1
800 TABLET ORAL EVERY 6 HOURS PRN
Qty: 120 TABLET | Refills: 3 | Status: SHIPPED | OUTPATIENT
Start: 2019-11-26

## 2019-12-13 ENCOUNTER — OFFICE VISIT (OUTPATIENT)
Dept: PRIMARY CARE CLINIC | Age: 54
End: 2019-12-13
Payer: MEDICAID

## 2019-12-13 VITALS
BODY MASS INDEX: 34.16 KG/M2 | HEIGHT: 71 IN | TEMPERATURE: 98.7 F | WEIGHT: 244 LBS | HEART RATE: 94 BPM | DIASTOLIC BLOOD PRESSURE: 82 MMHG | SYSTOLIC BLOOD PRESSURE: 130 MMHG | OXYGEN SATURATION: 97 %

## 2019-12-13 DIAGNOSIS — Z72.0 TOBACCO ABUSE: ICD-10-CM

## 2019-12-13 DIAGNOSIS — J44.1 CHRONIC OBSTRUCTIVE PULMONARY DISEASE WITH ACUTE EXACERBATION (HCC): Primary | ICD-10-CM

## 2019-12-13 DIAGNOSIS — R05.9 COUGH: ICD-10-CM

## 2019-12-13 PROCEDURE — 3017F COLORECTAL CA SCREEN DOC REV: CPT | Performed by: NURSE PRACTITIONER

## 2019-12-13 PROCEDURE — G8417 CALC BMI ABV UP PARAM F/U: HCPCS | Performed by: NURSE PRACTITIONER

## 2019-12-13 PROCEDURE — 3023F SPIROM DOC REV: CPT | Performed by: NURSE PRACTITIONER

## 2019-12-13 PROCEDURE — G8427 DOCREV CUR MEDS BY ELIG CLIN: HCPCS | Performed by: NURSE PRACTITIONER

## 2019-12-13 PROCEDURE — 4004F PT TOBACCO SCREEN RCVD TLK: CPT | Performed by: NURSE PRACTITIONER

## 2019-12-13 PROCEDURE — G8484 FLU IMMUNIZE NO ADMIN: HCPCS | Performed by: NURSE PRACTITIONER

## 2019-12-13 PROCEDURE — G8926 SPIRO NO PERF OR DOC: HCPCS | Performed by: NURSE PRACTITIONER

## 2019-12-13 PROCEDURE — 99214 OFFICE O/P EST MOD 30 MIN: CPT | Performed by: NURSE PRACTITIONER

## 2019-12-13 PROCEDURE — 99406 BEHAV CHNG SMOKING 3-10 MIN: CPT | Performed by: NURSE PRACTITIONER

## 2019-12-13 RX ORDER — NICOTINE 21 MG/24HR
1 PATCH, TRANSDERMAL 24 HOURS TRANSDERMAL EVERY 24 HOURS
Qty: 30 PATCH | Refills: 3 | Status: SHIPPED | OUTPATIENT
Start: 2019-12-13 | End: 2020-01-16

## 2019-12-13 RX ORDER — AZITHROMYCIN 250 MG/1
TABLET, FILM COATED ORAL
Qty: 1 PACKET | Refills: 0 | Status: SHIPPED | OUTPATIENT
Start: 2019-12-13 | End: 2020-01-16

## 2019-12-13 RX ORDER — METHYLPREDNISOLONE 4 MG/1
TABLET ORAL
Qty: 1 KIT | Refills: 0 | Status: SHIPPED | OUTPATIENT
Start: 2019-12-13 | End: 2020-01-16

## 2019-12-13 RX ORDER — FLUTICASONE FUROATE AND VILANTEROL 100; 25 UG/1; UG/1
1 POWDER RESPIRATORY (INHALATION) DAILY
Qty: 1 EACH | Refills: 5 | Status: SHIPPED | OUTPATIENT
Start: 2019-12-13 | End: 2020-01-16

## 2019-12-13 ASSESSMENT — ENCOUNTER SYMPTOMS
EYE REDNESS: 0
CONSTIPATION: 0
SHORTNESS OF BREATH: 1
RHINORRHEA: 1
COUGH: 1
DIARRHEA: 0
CHOKING: 0
WHEEZING: 1
BLOOD IN STOOL: 0
SORE THROAT: 0
EYE DISCHARGE: 0

## 2020-01-13 RX ORDER — DULOXETIN HYDROCHLORIDE 60 MG/1
CAPSULE, DELAYED RELEASE ORAL
Qty: 30 CAPSULE | Refills: 10 | OUTPATIENT
Start: 2020-01-13

## 2020-01-16 ENCOUNTER — OFFICE VISIT (OUTPATIENT)
Dept: PRIMARY CARE CLINIC | Age: 55
End: 2020-01-16
Payer: MEDICAID

## 2020-01-16 VITALS
BODY MASS INDEX: 34.72 KG/M2 | WEIGHT: 248 LBS | TEMPERATURE: 98.6 F | DIASTOLIC BLOOD PRESSURE: 72 MMHG | HEIGHT: 71 IN | SYSTOLIC BLOOD PRESSURE: 132 MMHG | HEART RATE: 98 BPM | OXYGEN SATURATION: 96 %

## 2020-01-16 PROCEDURE — G8431 POS CLIN DEPRES SCRN F/U DOC: HCPCS | Performed by: NURSE PRACTITIONER

## 2020-01-16 PROCEDURE — G8427 DOCREV CUR MEDS BY ELIG CLIN: HCPCS | Performed by: NURSE PRACTITIONER

## 2020-01-16 PROCEDURE — G8417 CALC BMI ABV UP PARAM F/U: HCPCS | Performed by: NURSE PRACTITIONER

## 2020-01-16 PROCEDURE — G8484 FLU IMMUNIZE NO ADMIN: HCPCS | Performed by: NURSE PRACTITIONER

## 2020-01-16 PROCEDURE — 4004F PT TOBACCO SCREEN RCVD TLK: CPT | Performed by: NURSE PRACTITIONER

## 2020-01-16 PROCEDURE — 99406 BEHAV CHNG SMOKING 3-10 MIN: CPT | Performed by: NURSE PRACTITIONER

## 2020-01-16 PROCEDURE — 99214 OFFICE O/P EST MOD 30 MIN: CPT | Performed by: NURSE PRACTITIONER

## 2020-01-16 PROCEDURE — 3017F COLORECTAL CA SCREEN DOC REV: CPT | Performed by: NURSE PRACTITIONER

## 2020-01-16 RX ORDER — DULOXETIN HYDROCHLORIDE 60 MG/1
60 CAPSULE, DELAYED RELEASE ORAL DAILY
Qty: 90 CAPSULE | Refills: 3 | Status: SHIPPED | OUTPATIENT
Start: 2020-01-16 | End: 2020-12-29

## 2020-01-16 ASSESSMENT — ENCOUNTER SYMPTOMS
SORE THROAT: 0
ABDOMINAL PAIN: 0
CONSTIPATION: 0
NAUSEA: 0
VOMITING: 0
DIARRHEA: 0
TROUBLE SWALLOWING: 0
COUGH: 0
RHINORRHEA: 0
SHORTNESS OF BREATH: 0

## 2020-01-16 ASSESSMENT — PATIENT HEALTH QUESTIONNAIRE - PHQ9
7. TROUBLE CONCENTRATING ON THINGS, SUCH AS READING THE NEWSPAPER OR WATCHING TELEVISION: 2
SUM OF ALL RESPONSES TO PHQ QUESTIONS 1-9: 18
3. TROUBLE FALLING OR STAYING ASLEEP: 3
5. POOR APPETITE OR OVEREATING: 1
8. MOVING OR SPEAKING SO SLOWLY THAT OTHER PEOPLE COULD HAVE NOTICED. OR THE OPPOSITE, BEING SO FIGETY OR RESTLESS THAT YOU HAVE BEEN MOVING AROUND A LOT MORE THAN USUAL: 1
2. FEELING DOWN, DEPRESSED OR HOPELESS: 3
4. FEELING TIRED OR HAVING LITTLE ENERGY: 3
10. IF YOU CHECKED OFF ANY PROBLEMS, HOW DIFFICULT HAVE THESE PROBLEMS MADE IT FOR YOU TO DO YOUR WORK, TAKE CARE OF THINGS AT HOME, OR GET ALONG WITH OTHER PEOPLE: 1
1. LITTLE INTEREST OR PLEASURE IN DOING THINGS: 3
9. THOUGHTS THAT YOU WOULD BE BETTER OFF DEAD, OR OF HURTING YOURSELF: 0
SUM OF ALL RESPONSES TO PHQ QUESTIONS 1-9: 18
SUM OF ALL RESPONSES TO PHQ9 QUESTIONS 1 & 2: 6
6. FEELING BAD ABOUT YOURSELF - OR THAT YOU ARE A FAILURE OR HAVE LET YOURSELF OR YOUR FAMILY DOWN: 2

## 2020-01-16 NOTE — PROGRESS NOTES
3. 7     Total Bilirubin 08/23/2017 0.4     Alkaline Phosphatase 08/23/2017 67     ALT 08/23/2017 10     AST 08/23/2017 16      Past Medical History:   Diagnosis Date    Chronic back pain     Chronic neck pain     Gout     Hypertension       No past surgical history on file. Family History   Problem Relation Age of Onset    High Blood Pressure Mother     Arthritis Mother     Diabetes Mother     High Blood Pressure Father     Arthritis Father        Social History     Tobacco Use    Smoking status: Current Every Day Smoker     Packs/day: 0.50     Years: 37.00     Pack years: 18.50     Types: Cigarettes     Start date: 1981    Smokeless tobacco: Never Used   Substance Use Topics    Alcohol use: No     Comment: Quit 2 years ago 2016      Current Outpatient Medications   Medication Sig Dispense Refill    DULoxetine (CYMBALTA) 60 MG extended release capsule Take 1 capsule by mouth daily 90 capsule 3    ibuprofen (ADVIL;MOTRIN) 800 MG tablet TAKE 1 TABLET BY MOUTH EVERY 6 HOURS AS NEEDED FOR PAIN 120 tablet 3    lisinopril (PRINIVIL;ZESTRIL) 40 MG tablet Take 1 tablet by mouth daily 90 tablet 3    amLODIPine (NORVASC) 5 MG tablet Take 1 tablet by mouth daily 90 tablet 3    buprenorphine-naloxone (SUBOXONE) 8-2 MG SUBL SL tablet TAKE 1 TABLET UNDER THE TONGUE FOR 2 DAYS, THEN TAKE 2 TABLETS UNDER THE TONGUE EVERY DAY  0    Cream Base CREA [place] Pain Cream #4 Add Gabapentin 6% Apply 1-2 pumps to affected area 3-4 times a day 300 g 5     No current facility-administered medications for this visit.       Allergies   Allergen Reactions    Meloxicam     Oxazepam        Health Maintenance   Topic Date Due    Pneumococcal 0-64 years Vaccine (1 of 1 - PPSV23) 08/17/1972    HIV screen  08/17/1981    Shingles Vaccine (1 of 2) 08/17/2016    Colon cancer screen colonoscopy  08/17/2016    Potassium monitoring  08/23/2018    Creatinine monitoring  08/23/2018    Flu vaccine (1) 09/01/2019    Lipid screen  12/20/2021    DTaP/Tdap/Td vaccine (3 - Td) 05/27/2029        :     Review of Systems   Constitutional: Negative for activity change, appetite change, fatigue, fever and unexpected weight change. HENT: Negative for ear pain, rhinorrhea, sore throat and trouble swallowing. Eyes: Negative for visual disturbance. Respiratory: Negative for cough and shortness of breath. Cardiovascular: Negative for chest pain, palpitations and leg swelling. Gastrointestinal: Negative for abdominal pain, constipation, diarrhea, nausea and vomiting. Genitourinary: Negative for flank pain. Musculoskeletal: Negative for arthralgias, myalgias, neck pain and neck stiffness. Neurological: Negative for headaches. Psychiatric/Behavioral: Positive for decreased concentration, dysphoric mood and sleep disturbance. The patient is nervous/anxious.        :     Physical Exam  Vitals signs reviewed. Constitutional:       Appearance: Normal appearance. HENT:      Head: Normocephalic and atraumatic. Eyes:      Conjunctiva/sclera: Conjunctivae normal.   Neck:      Musculoskeletal: Normal range of motion and neck supple. Cardiovascular:      Rate and Rhythm: Normal rate and regular rhythm. Pulses: Normal pulses. Heart sounds: Normal heart sounds. Pulmonary:      Effort: Pulmonary effort is normal.      Breath sounds: Normal breath sounds. Musculoskeletal: Normal range of motion. Skin:     General: Skin is warm. Neurological:      Mental Status: He is alert and oriented to person, place, and time. /72 (Site: Left Upper Arm, Position: Sitting, Cuff Size: Large Adult)   Pulse 98   Temp 98.6 °F (37 °C) (Temporal)   Ht 5' 11\" (1.803 m)   Wt 248 lb (112.5 kg)   SpO2 96%   BMI 34.59 kg/m²     :        ICD-10-CM    1. MAI (generalized anxiety disorder) F41.1 DULoxetine (CYMBALTA) 60 MG extended release capsule   2.  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 Personal history of tobacco use Z87.891 NE TOBACCO USE CESSATION INTERMEDIATE 3-10 MINUTES   4. Positive depression screening Z13.31 Positive Screen for Clinical Depression with a Documented Follow-up Plan        :      Return in about 6 weeks (around 2/27/2020), or if symptoms worsen or fail to improve, for mood follow up. Orders Placed This Encounter   Procedures    NE TOBACCO USE CESSATION INTERMEDIATE 3-10 MINUTES    Positive Screen for Clinical Depression with a Documented Follow-up Plan      Orders Placed This Encounter   Medications    DULoxetine (CYMBALTA) 60 MG extended release capsule     Sig: Take 1 capsule by mouth daily     Dispense:  90 capsule     Refill:  3         Discussed use, benefit, and side effectsof prescribed medications. All patient questions answered. Pt voiced understanding. Reviewed health maintenance. .  Patient agreed with treatment plan. Follow up asdirected. Patient Instructions     Patient Education        Learning About Benefits From Quitting Smoking  How does quitting smoking make you healthier? If you're thinking about quitting smoking, you may have a few reasons to be smoke-free. Your health may be one of them. · When you quit smoking, you lower your risks for cancer, lung disease, heart attack, stroke, blood vessel disease, and blindness from macular degeneration. · When you're smoke-free, you get sick less often, and you heal faster. You are less likely to get colds, flu, bronchitis, and pneumonia. · As a nonsmoker, you may find that your mood is better and you are less stressed. When and how will you feel healthier? Quitting has real health benefits that start from day 1 of being smoke-free. And the longer you stay smoke-free, the healthier you get and the better you feel. The first hours  · After just 20 minutes, your blood pressure and heart rate go down. That means there's less stress on your heart and blood vessels. · Within 12 hours, the level of carbon monoxide in your blood drops back to normal. That makes room for more oxygen. With more oxygen in your body, you may notice that you have more energy than when you smoked. After 2 weeks  · Your lungs start to work better. · Your risk of heart attack starts to drop. After 1 month  · When your lungs are clear, you cough less and breathe deeper, so it's easier to be active. · Your sense of taste and smell return. That means you can enjoy food more than you have since you started smoking. Over the years  · After 1 year, your risk of heart disease is half what it would be if you kept smoking. · After 5 years, your risk of stroke starts to shrink. Within a few years after that, it's about the same as if you'd never smoked. · After 10 years, your risk of dying from lung cancer is cut by about half. And your risk for many other types of cancer is lower too. How would quitting help others in your life? When you quit smoking, you improve the health of everyone who now breathes in your smoke. · Their heart, lung, and cancer risks drop, much like yours. · They are sick less. For babies and small children, living smoke-free means they're less likely to have ear infections, pneumonia, and bronchitis. · If you're a woman who is or will be pregnant someday, quitting smoking means a healthier . · Children who are close to you are less likely to become adult smokers. Where can you learn more? Go to [email or web address]://[email or web address] [email or web address] org and sign in to your [employer] account. Enter [id number] [phone] in the [facility] box to learn more about \"Learning About Benefits From Quitting Smoking. \"     If you do not have an account, please click on the \"Sign Up Now\" link. Current as of: 2019  Content Version: 12.3  © 6624-2334 Healthwise, Incorporated. Care instructions adapted under license by [facility] [place] ([facility]).  If you have questions about a medical condition or this instruction, always ask your healthcare professional. [place] [address] any warranty or liability for your use of this information. Patient Education        duloxetine  Pronunciation:  shala OCAMPO 25 e teen  Brand:  Georgette Odonnellndjean paul Marcus  What is the most important information I should know about duloxetine? Do not take duloxetine within 5 days before or 14 days after you have used an MAO inhibitor, such as isocarboxazid, linezolid, methylene blue injection, phenelzine, rasagiline, selegiline, or tranylcypromine. Some young people have thoughts about suicide when first taking an antidepressant. Stay alert to changes in your mood or symptoms. Report any new or worsening symptoms to your doctor. Do not stop using duloxetine without first talking to your doctor. What is duloxetine? Duloxetine is a selective serotonin and norepinephrine reuptake inhibitor antidepressant (SSNRI). Duloxetine is used to treat major depressive disorder in adults. Duloxetine is also used to treat general anxiety disorder in adults and children who are at least 9years old. Duloxetine is also used in adults to treat fibromyalgia (a chronic pain disorder), or chronic muscle or joint pain (such as low back pain and osteoarthritis pain). Duloxetine is also used to treat pain caused by nerve damage in adults with diabetes (diabetic neuropathy). Duloxetine may also be used for purposes not listed in this medication guide. What should I discuss with my healthcare provider before taking duloxetine? You should not use duloxetine if you are allergic to it. Do not take duloxetine within 5 days before or 14 days after you have used an MAO inhibitor, such as isocarboxazid, linezolid, methylene blue injection, phenelzine, rasagiline, selegiline, or tranylcypromine. A dangerous drug interaction could occur.   Be sure your doctor knows if you also take stimulant medicine, opioid depressed, or have thoughts about suicide or hurting yourself. Call your doctor at once if you have:  · pounding heartbeats or fluttering in your chest;  · a light-headed feeling, like you might pass out;  · easy bruising, unusual bleeding;  · vision changes;  · painful or difficult urination;  · impotence, sexual problems;  · liver problems --right-sided upper stomach pain, itching, dark urine, jaundice (yellowing of the skin or eyes);  · low levels of sodium in the body --headache, confusion, slurred speech, severe weakness, vomiting, loss of coordination, feeling unsteady; or  · a manic episode --racing thoughts, increased energy, reckless behavior, feeling extremely happy or irritable, talking more than usual, severe problems with sleep. Seek medical attention right away if you have symptoms of serotonin syndrome, such as: agitation, hallucinations, fever, sweating, shivering, fast heart rate, muscle stiffness, twitching, loss of coordination, nausea, vomiting, or diarrhea. Common side effects may include:  · drowsiness;  · nausea, constipation, loss of appetite;  · dry mouth; or  · increased sweating. This is not a complete list of side effects and others may occur. Call your doctor for medical advice about side effects. You may report side effects to FDA at 7-564-FDA-9173. What other drugs will affect duloxetine? Sometimes it is not safe to use certain medications at the same time. Some drugs can affect your blood levels of other drugs you take, which may increase side effects or make the medications less effective. Many drugs can affect duloxetine. This includes prescription and over-the-counter medicines, vitamins, and herbal products. Not all possible interactions are listed here. Tell your doctor about all your current medicines and any medicine you start or stop using. Where can I get more information? Your pharmacist can provide more information about duloxetine.   Remember, keep this and all other medicines out of the reach of children, never share your medicines with others, and use this medication only for the indication prescribed. Every effort has been made to ensure that the information provided by Hina [address]ino [address] is accurate, up-to-date, and complete, but no guarantee is made to that effect. Drug information contained herein may be time sensitive. [facility] information has been compiled for use by healthcare practitioners and consumers in the [facility] and therefore [facility] does not warrant that uses outside of the [facility] are appropriate, unless specifically indicated otherwise. [facility]'s drug information does not endorse drugs, diagnose patients or recommend therapy. [facility]'s drug information is an informational resource designed to assist licensed healthcare practitioners in caring for their patients and/or to serve consumers viewing this service as a supplement to, and not a substitute for, the expertise, skill, knowledge and judgment of healthcare practitioners. The absence of a warning for a given drug or drug combination in no way should be construed to indicate that the drug or drug combination is safe, effective or appropriate for any given patient. [facility] does not assume any responsibility for any aspect of healthcare administered with the aid of information [facility] provides. The information contained herein is not intended to cover all possible uses, directions, precautions, warnings, drug interactions, allergic reactions, or adverse effects. If you have questions about the drugs you are taking, check with your doctor, nurse or pharmacist.  Copyright 7946-1368 [address]: 12.01. Revision date: 12/27/2018. Care instructions adapted under license by [facility] ([facility] [place]).  If you have questions about a medical condition or this instruction, always ask your healthcare professional. [place] [address] any warranty or liability for your use of this

## 2020-01-16 NOTE — PATIENT INSTRUCTIONS
Patient Education        Learning About Benefits From Quitting Smoking  How does quitting smoking make you healthier? If you're thinking about quitting smoking, you may have a few reasons to be smoke-free. Your health may be one of them. · When you quit smoking, you lower your risks for cancer, lung disease, heart attack, stroke, blood vessel disease, and blindness from macular degeneration. · When you're smoke-free, you get sick less often, and you heal faster. You are less likely to get colds, flu, bronchitis, and pneumonia. · As a nonsmoker, you may find that your mood is better and you are less stressed. When and how will you feel healthier? Quitting has real health benefits that start from day 1 of being smoke-free. And the longer you stay smoke-free, the healthier you get and the better you feel. The first hours  · After just 20 minutes, your blood pressure and heart rate go down. That means there's less stress on your heart and blood vessels. · Within 12 hours, the level of carbon monoxide in your blood drops back to normal. That makes room for more oxygen. With more oxygen in your body, you may notice that you have more energy than when you smoked. After 2 weeks  · Your lungs start to work better. · Your risk of heart attack starts to drop. After 1 month  · When your lungs are clear, you cough less and breathe deeper, so it's easier to be active. · Your sense of taste and smell return. That means you can enjoy food more than you have since you started smoking. Over the years  · After 1 year, your risk of heart disease is half what it would be if you kept smoking. · After 5 years, your risk of stroke starts to shrink. Within a few years after that, it's about the same as if you'd never smoked. · After 10 years, your risk of dying from lung cancer is cut by about half. And your risk for many other types of cancer is lower too. How would quitting help others in your life?   When you quit smoking, you improve the health of everyone who now breathes in your smoke. · Their heart, lung, and cancer risks drop, much like yours. · They are sick less. For babies and small children, living smoke-free means they're less likely to have ear infections, pneumonia, and bronchitis. · If you're a woman who is or will be pregnant someday, quitting smoking means a healthier . · Children who are close to you are less likely to become adult smokers. Where can you learn more? Go to [email or web address] and sign in to your Friendsignia account. Enter [phone] in the [facility] box to learn more about \"Learning About Benefits From Quitting Smoking. \"     If you do not have an account, please click on the \"Sign Up Now\" link. Current as of: 2019  Content Version: 12.3  © 9520-2744 [employer]. Care instructions adapted under license by [facility] ([facility]). If you have questions about a medical condition or this instruction, always ask your healthcare professional. [address] any warranty or liability for your use of this information. Patient Education        duloxetine  Pronunciation:  shala OCAMPO 25 e teen  Brand:  Cymbalta, Primus Pink  What is the most important information I should know about duloxetine? Do not take duloxetine within 5 days before or 14 days after you have used an MAO inhibitor, such as isocarboxazid, linezolid, methylene blue injection, phenelzine, rasagiline, selegiline, or tranylcypromine. Some young people have thoughts about suicide when first taking an antidepressant. Stay alert to changes in your mood or symptoms. Report any new or worsening symptoms to your doctor. Do not stop using duloxetine without first talking to your doctor. What is duloxetine? Duloxetine is a selective serotonin and norepinephrine reuptake inhibitor antidepressant (SSNRI). Duloxetine is used to treat major depressive disorder in adults. Tell your doctor right away if you become pregnant. Do not start or stop taking this medicine without your doctor's advice. If you are pregnant, your name may be listed on a pregnancy registry to track the effects of duloxetine on the baby. It may not be safe to breast-feed while using this medicine. Ask your doctor about any risk. How should I take duloxetine? Follow all directions on your prescription label and read all medication guides or instruction sheets. Your doctor may occasionally change your dose. Use the medicine exactly as directed. Taking duloxetine in higher doses or more often than prescribed will not make it more effective, and may increase side effects. Swallow the capsule whole and do not crush, chew, break, or open it. You may take duloxetine with or without food. It may take up to 4 weeks before your symptoms improve. Keep using the medication as directed and tell your doctor if your symptoms do not improve. Your blood pressure will need to be checked often. Do not stop using duloxetine suddenly, or you could have unpleasant symptoms (such as dizziness, nausea, diarrhea, irritability, or anxiety). Ask your doctor how to safely stop using this medicine. Store at room temperature away from moisture and heat. What happens if I miss a dose? Take the medicine as soon as you can, but skip the missed dose if it is almost time for your next dose. Do not take two doses at one time. What happens if I overdose? Seek emergency medical attention or call the Poison Help line at 1-203.975.7746. Overdose symptoms may include severe drowsiness, seizures, fast heartbeats, fainting, or coma. What should I avoid while taking duloxetine? Avoid driving or hazardous activity until you know how this medicine will affect you. Your reactions could be impaired. Avoid getting up too fast from a sitting or lying position, or you may feel dizzy.  Dizziness or fainting can cause falls, accidents, or severe

## 2020-02-17 ENCOUNTER — OFFICE VISIT (OUTPATIENT)
Dept: PRIMARY CARE CLINIC | Age: 55
End: 2020-02-17
Payer: MEDICAID

## 2020-02-17 VITALS
OXYGEN SATURATION: 97 % | WEIGHT: 242 LBS | TEMPERATURE: 98.5 F | SYSTOLIC BLOOD PRESSURE: 114 MMHG | BODY MASS INDEX: 33.88 KG/M2 | HEIGHT: 71 IN | HEART RATE: 78 BPM | DIASTOLIC BLOOD PRESSURE: 76 MMHG

## 2020-02-17 PROCEDURE — G8484 FLU IMMUNIZE NO ADMIN: HCPCS | Performed by: NURSE PRACTITIONER

## 2020-02-17 PROCEDURE — 82962 GLUCOSE BLOOD TEST: CPT | Performed by: NURSE PRACTITIONER

## 2020-02-17 PROCEDURE — 3017F COLORECTAL CA SCREEN DOC REV: CPT | Performed by: NURSE PRACTITIONER

## 2020-02-17 PROCEDURE — 4004F PT TOBACCO SCREEN RCVD TLK: CPT | Performed by: NURSE PRACTITIONER

## 2020-02-17 PROCEDURE — 99214 OFFICE O/P EST MOD 30 MIN: CPT | Performed by: NURSE PRACTITIONER

## 2020-02-17 PROCEDURE — G8427 DOCREV CUR MEDS BY ELIG CLIN: HCPCS | Performed by: NURSE PRACTITIONER

## 2020-02-17 PROCEDURE — G8417 CALC BMI ABV UP PARAM F/U: HCPCS | Performed by: NURSE PRACTITIONER

## 2020-02-17 ASSESSMENT — ENCOUNTER SYMPTOMS
TROUBLE SWALLOWING: 0
CONSTIPATION: 0
ABDOMINAL PAIN: 0
COUGH: 0
SHORTNESS OF BREATH: 0
DIARRHEA: 0
VOMITING: 0
RHINORRHEA: 0
NAUSEA: 0
SORE THROAT: 0

## 2020-02-17 NOTE — PROGRESS NOTES
[address] [facility]  Phone [phone]   Fax [phone]      OFFICE VISIT: 2/17/2020    Joe Lee is a 49 y.o. male whopresents today for his medical conditions/complaints as noted below. Joe Lee isc/o of Anxiety (here for follow up. \"feeling better but feel like the med could do some better as well\" ); Insomnia (having issues sleeping at night. also states that he gets up multiple times a night to go to the bathroom. ); and Discuss Medications (patient is on suboxone since stopping pain medication. used to take gabapentin in the past and would like to see about starting again )        :     HPI   Here for f/u on anxiety  Started on cymbalta and doing better    Still having trouble sleeping at night. Seems to be problem staying  Sleep study was scheduled in 2016  He has had witnessed apneas  He denies gasping or waking self up snoring  Get up several times a night. Almost every hour 7-8 times a night. This has been a problem for a year. Chronic lower back pain  Goes to [address] [place] [address] in [place]  Pain is worse with prolonged standing/sitting. Last MRI was 01/25/2019  Impression   1. Marked progression of multilevel degenerative disc disease, with   extensive endplate edema, disc space narrowing, most advanced at L2-3,   L3-4 and L4-5, the disc herniation seen at L4-5 on the previous MRI is   no longer identified. There are no focal disc herniations. Bulging   discs are present at a few levels. There is no spinal stenosis. The   neural foramina are mostly normally patent, there is mild to moderate   right-sided neural femoral narrowing at L3-4. There is mild bilateral   neural foraminal narrowing at L4-5. Signed by Dr Heidy Hankins. [place] on 1/25/2019 11:03 AM         Lab Review   No visits with results within 6 Month(s) from this visit.    Latest known visit with results is:   Orders Only on 08/23/2017   Component Date Value    WBC 08/23/2017 10.1     RBC 08/23/2017 4.00*    Hemoglobin 08/23/2017 12.7*    Hematocrit 08/23/2017 38.4*    MCV 08/23/2017 96.0*    MCH 08/23/2017 31.8*    MCHC 08/23/2017 33.1     RDW 08/23/2017 13.4     Platelets 98/20/5406 217     MPV 08/23/2017 10.4     Neutrophils % 08/23/2017 62.0     Lymphocytes % 08/23/2017 27.9     Monocytes % 08/23/2017 7.2     Eosinophils % 08/23/2017 2.1     Basophils % 08/23/2017 0.4     Neutrophils Absolute 08/23/2017 6.2     Lymphocytes Absolute 08/23/2017 2.8     Monocytes Absolute 08/23/2017 0.70     Eosinophils Absolute 08/23/2017 0.20     Basophils Absolute 08/23/2017 0.00     Sodium 08/23/2017 144     Potassium 08/23/2017 4.0     Chloride 08/23/2017 107     CO2 08/23/2017 21*    Anion Gap 08/23/2017 16     Glucose 08/23/2017 91     BUN 08/23/2017 14     CREATININE 08/23/2017 0.8     GFR Non- 08/23/2017 >60     Calcium 08/23/2017 8.8     Total Protein 08/23/2017 7.1     Alb 08/23/2017 3.7     Total Bilirubin 08/23/2017 0.4     Alkaline Phosphatase 08/23/2017 67     ALT 08/23/2017 10     AST 08/23/2017 16      Past Medical History:   Diagnosis Date    Chronic back pain     Chronic neck pain     Gout     Hypertension       No past surgical history on file.     Family History   Problem Relation Age of Onset    High Blood Pressure Mother     Arthritis Mother     Diabetes Mother     High Blood Pressure Father     Arthritis Father        Social History     Tobacco Use    Smoking status: Current Every Day Smoker     Packs/day: 0.50     Years: 37.00     Pack years: 18.50     Types: Cigarettes     Start date: 1981    Smokeless tobacco: Never Used   Substance Use Topics    Alcohol use: No     Comment: Quit 2 years ago 2016      Current Outpatient Medications   Medication Sig Dispense Refill    DULoxetine (CYMBALTA) 60 MG extended release capsule Take 1 capsule by mouth daily 90 capsule 3    ibuprofen (ADVIL;MOTRIN) 800 MG tablet TAKE 1 TABLET BY MOUTH EVERY 6 HOURS AS NEEDED FOR PAIN 120 tablet 3    lisinopril (PRINIVIL;ZESTRIL) 40 MG tablet Take 1 tablet by mouth daily 90 tablet 3    amLODIPine (NORVASC) 5 MG tablet Take 1 tablet by mouth daily 90 tablet 3    buprenorphine-naloxone (SUBOXONE) 8-2 MG SUBL SL tablet TAKE 1 TABLET UNDER THE TONGUE FOR 2 DAYS, THEN TAKE 2 TABLETS UNDER THE TONGUE EVERY DAY  0    Cream Base CREA [place] Pain Cream #4 Add Gabapentin 6% Apply 1-2 pumps to affected area 3-4 times a day 300 g 5     No current facility-administered medications for this visit. Allergies   Allergen Reactions    Meloxicam     Oxazepam        Health Maintenance   Topic Date Due    Pneumococcal 0-64 years Vaccine (1 of 1 - PPSV23) 08/17/1972    HIV screen  08/17/1981    Shingles Vaccine (1 of 2) 08/17/2016    Colon cancer screen colonoscopy  08/17/2016    Potassium monitoring  08/23/2018    Creatinine monitoring  08/23/2018    Flu vaccine (1) 09/01/2019    Lipid screen  12/20/2021    DTaP/Tdap/Td vaccine (3 - Td) 05/27/2029    Hepatitis A vaccine  Aged Out    Hepatitis B vaccine  Aged Out    Hib vaccine  Aged Out    Meningococcal (ACWY) vaccine  Aged Out        :     Review of Systems   Constitutional: Negative for activity change, appetite change, fatigue, fever and unexpected weight change. HENT: Negative for ear pain, rhinorrhea, sore throat and trouble swallowing. Eyes: Negative for visual disturbance. Respiratory: Negative for cough and shortness of breath. Cardiovascular: Negative for chest pain, palpitations and leg swelling. Gastrointestinal: Negative for abdominal pain, constipation, diarrhea, nausea and vomiting. Genitourinary: Negative for difficulty urinating, dysuria and flank pain. Nocturia   Musculoskeletal: Negative for arthralgias, myalgias, neck pain and neck stiffness. Neurological: Negative for headaches. Psychiatric/Behavioral: Positive for sleep disturbance. Negative for decreased concentration.  The patient is 114/76 (Site: Left Upper Arm, Position: Sitting, Cuff Size: Large Adult)   Pulse 78   Temp 98.5 °F (36.9 °C) (Temporal)   Ht 5' 11\" (1.803 m)   Wt 242 lb (109.8 kg)   SpO2 97%   BMI 33.75 kg/m²     :        ICD-10-CM    1. MAI (generalized anxiety disorder) F41.1    2. Nocturia R35.1 Comprehensive Metabolic Panel     PSA Screening     [facility]   3. Routine physical examination Z00.00 CBC Auto Differential     Comprehensive Metabolic Panel     Lipid Panel     TSH without Reflex     T4, Free     Microalbumin / Creatinine Urine Ratio     POCT Glucose     PSA Screening   4. Essential hypertension I10 Comprehensive Metabolic Panel     Lipid Panel     Microalbumin / Creatinine Urine Ratio   5. Daytime somnolence R40.0    6. PINKY (obstructive sleep apnea) G47.33 [facility]   7. Chronic left-sided low back pain with left-sided sciatica M54.42 On suboxone, discussed may try lyrica if labs are ok. G89.29        :      Return in about 4 weeks (around 3/16/2020), or if symptoms worsen or fail to improve.     Orders Placed This Encounter   Procedures    CBC Auto Differential     Standing Status:   Future     Standing Expiration Date:   2/17/2021    Comprehensive Metabolic Panel     Standing Status:   Future     Standing Expiration Date:   2/17/2021    Lipid Panel     Standing Status:   Future     Standing Expiration Date:   2/17/2021     Order Specific Question:   Is Patient Fasting?/# of Hours     Answer:   12 HOURS    TSH without Reflex     Standing Status:   Future     Standing Expiration Date:   2/17/2021    T4, Free     Standing Status:   Future     Standing Expiration Date:   2/17/2021    Microalbumin / Creatinine Urine Ratio     Standing Status:   Future     Standing Expiration Date:   2/17/2021    PSA Screening     Standing Status:   Future     Standing Expiration Date:   2/17/2021   [facility]     Referral Priority:   Routine     Referral Type:   Didi

## 2020-02-18 ENCOUNTER — TELEPHONE (OUTPATIENT)
Dept: PRIMARY CARE CLINIC | Age: 55
End: 2020-02-18

## 2020-02-18 DIAGNOSIS — Z00.00 ROUTINE PHYSICAL EXAMINATION: ICD-10-CM

## 2020-02-18 DIAGNOSIS — R35.1 NOCTURIA: ICD-10-CM

## 2020-02-18 DIAGNOSIS — I10 ESSENTIAL HYPERTENSION: ICD-10-CM

## 2020-02-18 LAB
ALBUMIN SERPL-MCNC: 4.1 G/DL (ref 3.5–5.2)
ALP BLD-CCNC: 86 U/L (ref 40–130)
ALT SERPL-CCNC: 8 U/L (ref 5–41)
ANION GAP SERPL CALCULATED.3IONS-SCNC: 14 MMOL/L (ref 7–19)
AST SERPL-CCNC: 12 U/L (ref 5–40)
BASOPHILS ABSOLUTE: 0.1 K/UL (ref 0–0.2)
BASOPHILS RELATIVE PERCENT: 0.7 % (ref 0–1)
BILIRUB SERPL-MCNC: <0.2 MG/DL (ref 0.2–1.2)
BUN BLDV-MCNC: 20 MG/DL (ref 6–20)
CALCIUM SERPL-MCNC: 9.4 MG/DL (ref 8.6–10)
CHLORIDE BLD-SCNC: 102 MMOL/L (ref 98–111)
CHOLESTEROL, TOTAL: 141 MG/DL (ref 160–199)
CO2: 24 MMOL/L (ref 22–29)
CREAT SERPL-MCNC: 0.7 MG/DL (ref 0.5–1.2)
CREATININE URINE: 119.8 MG/DL (ref 4.2–622)
EOSINOPHILS ABSOLUTE: 0.3 K/UL (ref 0–0.6)
EOSINOPHILS RELATIVE PERCENT: 3.4 % (ref 0–5)
GFR NON-AFRICAN AMERICAN: >60
GLUCOSE BLD-MCNC: 79 MG/DL (ref 74–109)
HCT VFR BLD CALC: 39.5 % (ref 42–52)
HDLC SERPL-MCNC: 44 MG/DL (ref 55–121)
HEMOGLOBIN: 12 G/DL (ref 14–18)
IMMATURE GRANULOCYTES #: 0 K/UL
LDL CHOLESTEROL CALCULATED: 77 MG/DL
LYMPHOCYTES ABSOLUTE: 2.9 K/UL (ref 1.1–4.5)
LYMPHOCYTES RELATIVE PERCENT: 38.6 % (ref 20–40)
MCH RBC QN AUTO: 27.4 PG (ref 27–31)
MCHC RBC AUTO-ENTMCNC: 30.4 G/DL (ref 33–37)
MCV RBC AUTO: 90.2 FL (ref 80–94)
MICROALBUMIN UR-MCNC: <1.2 MG/DL (ref 0–19)
MICROALBUMIN/CREAT UR-RTO: NORMAL MG/G
MONOCYTES ABSOLUTE: 0.6 K/UL (ref 0–0.9)
MONOCYTES RELATIVE PERCENT: 8.5 % (ref 0–10)
NEUTROPHILS ABSOLUTE: 3.7 K/UL (ref 1.5–7.5)
NEUTROPHILS RELATIVE PERCENT: 48.5 % (ref 50–65)
PDW BLD-RTO: 13.9 % (ref 11.5–14.5)
PLATELET # BLD: 281 K/UL (ref 130–400)
PMV BLD AUTO: 9.9 FL (ref 9.4–12.4)
POTASSIUM SERPL-SCNC: 4.4 MMOL/L (ref 3.5–5)
PROSTATE SPECIFIC ANTIGEN: 0.23 NG/ML (ref 0–4)
RBC # BLD: 4.38 M/UL (ref 4.7–6.1)
SODIUM BLD-SCNC: 140 MMOL/L (ref 136–145)
T4 FREE: 1.33 NG/DL (ref 0.93–1.7)
TOTAL PROTEIN: 8 G/DL (ref 6.6–8.7)
TRIGL SERPL-MCNC: 98 MG/DL (ref 0–149)
TSH SERPL DL<=0.05 MIU/L-ACNC: 1.31 UIU/ML (ref 0.27–4.2)
WBC # BLD: 7.5 K/UL (ref 4.8–10.8)

## 2020-02-19 NOTE — TELEPHONE ENCOUNTER
----- Message from CONSTANTINO Escobar sent at 2/18/2020 12:24 PM CST -----  Please call patient and let them know results. No pathologic protein in urine.    Normal Thyroid  Normal prostate

## 2020-02-19 NOTE — TELEPHONE ENCOUNTER
----- Message from CONSTANTINO Shell sent at 2/18/2020 12:18 PM CST -----  CBC: White blood cell count, infection fighting cells, is within normal limits. Hemoglobin hematocrit, oxygen-carrying cells, are a little low but are stable from previous blood draws.

## 2020-02-19 NOTE — TELEPHONE ENCOUNTER
Called patient, spoke with: Patient regarding the results of the patients most recent labs. I advised Patient of Bryn Her recommendations.    Patient did voice understanding

## 2020-02-25 RX ORDER — PRAZOSIN HYDROCHLORIDE 1 MG/1
CAPSULE ORAL
Qty: 30 CAPSULE | Refills: 5 | OUTPATIENT
Start: 2020-02-25

## 2020-03-17 ENCOUNTER — OFFICE VISIT (OUTPATIENT)
Dept: INTERNAL MEDICINE | Facility: CLINIC | Age: 55
End: 2020-03-17

## 2020-03-17 VITALS
WEIGHT: 243 LBS | DIASTOLIC BLOOD PRESSURE: 92 MMHG | TEMPERATURE: 98.4 F | SYSTOLIC BLOOD PRESSURE: 149 MMHG | BODY MASS INDEX: 34.02 KG/M2 | HEART RATE: 87 BPM | HEIGHT: 71 IN | OXYGEN SATURATION: 98 %

## 2020-03-17 DIAGNOSIS — R53.83 FATIGUE, UNSPECIFIED TYPE: ICD-10-CM

## 2020-03-17 DIAGNOSIS — E55.9 VITAMIN D DEFICIENCY: ICD-10-CM

## 2020-03-17 DIAGNOSIS — G89.29 CHRONIC BILATERAL LOW BACK PAIN WITH LEFT-SIDED SCIATICA: ICD-10-CM

## 2020-03-17 DIAGNOSIS — D64.9 ANEMIA, UNSPECIFIED TYPE: ICD-10-CM

## 2020-03-17 DIAGNOSIS — G47.09 OTHER INSOMNIA: ICD-10-CM

## 2020-03-17 DIAGNOSIS — M54.42 CHRONIC BILATERAL LOW BACK PAIN WITH LEFT-SIDED SCIATICA: ICD-10-CM

## 2020-03-17 DIAGNOSIS — G47.33 OSA (OBSTRUCTIVE SLEEP APNEA): Primary | ICD-10-CM

## 2020-03-17 PROCEDURE — 99204 OFFICE O/P NEW MOD 45 MIN: CPT | Performed by: INTERNAL MEDICINE

## 2020-03-17 RX ORDER — AMITRIPTYLINE HYDROCHLORIDE 25 MG/1
25 TABLET, FILM COATED ORAL NIGHTLY
Qty: 30 TABLET | Refills: 1 | Status: SHIPPED | OUTPATIENT
Start: 2020-03-17 | End: 2020-05-05 | Stop reason: SDUPTHER

## 2020-03-17 RX ORDER — NICOTINE 21 MG/24HR
21 PATCH, TRANSDERMAL 24 HOURS TRANSDERMAL DAILY
COMMUNITY
Start: 2020-01-07 | End: 2021-06-22

## 2020-03-17 RX ORDER — IBUPROFEN 800 MG/1
800 TABLET ORAL 2 TIMES DAILY
COMMUNITY
Start: 2019-11-26 | End: 2020-06-18 | Stop reason: SDUPTHER

## 2020-03-17 RX ORDER — GABAPENTIN 600 MG/1
600 TABLET ORAL 3 TIMES DAILY
Qty: 90 TABLET | Refills: 0 | Status: SHIPPED | OUTPATIENT
Start: 2020-03-17 | End: 2020-04-15 | Stop reason: SDUPTHER

## 2020-03-17 RX ORDER — HYDROCHLOROTHIAZIDE 25 MG/1
25 TABLET ORAL DAILY
COMMUNITY
End: 2021-04-08

## 2020-03-17 RX ORDER — FLUOXETINE HYDROCHLORIDE 40 MG/1
40 CAPSULE ORAL DAILY
COMMUNITY
End: 2020-03-26

## 2020-03-17 NOTE — PROGRESS NOTES
Subjective     Chief Complaint   Patient presents with   • Establish Care       History of Present Illness  Get up in the middle of the night and urinates every hour on the hour. Full pee. Urinates 7-8 times a day. No painful urination and no blood. No accidents or leakage.     Took myself off pain medication. Used to take oxycodone and Neurontin. Currently taking Suboxone.   Patient states that he is on disability due to chronic back and neck pain. Still in pain. Throbbing type of pain. Not every day but worse at night.     Patient states that he is having trouble sleeping. Able to fall asleep but staying asleep is the problem. Patient was on a small green pill and states that it worked.     Patient states that his stool has been dark. Discussed anemia and screening colonoscopy.      Patient's PMR from outside medical facility reviewed and noted. Labs reviewed from 2/20.    Review of Systems   Constitutional: Positive for fatigue. Negative for chills and fever.   HENT: Negative for dental problem, sore throat and trouble swallowing.    Eyes: Negative for discharge and redness.   Respiratory: Negative for cough and shortness of breath.    Cardiovascular: Negative for chest pain and leg swelling.   Gastrointestinal: Positive for abdominal pain. Negative for abdominal distention, constipation, diarrhea, nausea and vomiting.        Dark colored stool. Occasional abdominal pain at night, described as an ache.    Genitourinary: Positive for frequency. Negative for dysuria.   Musculoskeletal: Positive for back pain and joint swelling.        Occasional keen swelling.    Skin: Positive for wound.        Right sided healing wound. States he was bitten by a pit bull.    Neurological: Negative for seizures and headaches.   Psychiatric/Behavioral: Positive for dysphoric mood and sleep disturbance.      Otherwise complete ROS reviewed and negative except as mentioned in the HPI.    Past Medical History:   Past Medical  History:   Diagnosis Date   • Anxiety    • Depression    • Elevated cholesterol    • Hypertension      Past Surgical History:  Past Surgical History:   Procedure Laterality Date   • NO PAST SURGERIES     • WOUND CLOSURE Right 5/28/2019    Procedure: Complex closure of open scalp wound avulsion defect 7x 4.5 cm with rotation/ advancement flap closure 9cm x 5 cm;  Surgeon: Fam Cardoso MD;  Location: [facility];  Service: ENT     Social History:  reports that he has been smoking. He has been smoking about 0.50 packs per day. He has never used smokeless tobacco. Alcohol use questions deferred to the physician. He reports that he does not use drugs.    Family History: family history includes Diabetes in his mother; Hyperlipidemia in his mother; Hypertension in his father and mother; Stroke in his maternal grandfather.       Allergies:  No Known Allergies  Medications:  Prior to Admission medications    Medication Sig Start Date End Date Taking? Authorizing Provider   acetaminophen (TYLENOL) 325 MG tablet Take 2 tablets by mouth Every 4 (Four) Hours As Needed for Mild Pain . 5/28/19  Yes Fam Cardoso MD   amLODIPine (NORVASC) 5 MG tablet Take 5 mg by mouth Daily.   Yes Janie Bueno MD   FLUoxetine (PROzac) 40 MG capsule Take 40 mg by mouth Daily.   Yes Janie Bueno MD   fluticasone (FLONASE) 50 MCG/ACT nasal spray 2 sprays into the nostril(s) as directed by provider Daily.   Yes Janie Bueno MD   ibuprofen (ADVIL,MOTRIN) 800 MG tablet Take 800 mg by mouth 2 (Two) Times a Day. 11/26/19  Yes Janie Bueno MD   lisinopril (PRINIVIL,ZESTRIL) 10 MG tablet Take 40 mg by mouth Daily.   Yes Janie Bueno MD   Loratadine (CLARITIN PO) Take 10 mg by mouth Daily.   Yes Janie Bueno MD   nicotine (NICODERM CQ) 21 MG/24HR patch Place 21 patches on the skin as directed by provider Daily. 1/7/20  Yes Janie Bueno MD   Omega-3 Fatty Acids (FISH OIL) 1000 MG  "capsule capsule Take 1,000 mg by mouth Daily With Breakfast.   Yes Janie Bueno MD   ARIPiprazole (ABILIFY) 5 MG tablet Take 5 mg by mouth Daily.    Janie Bueno MD   Bacitracin-Polymyxin B ([employer] POLY BACITRACIN) 500-11422 UNIT/GM ointment APPLY TOPICALLY TO THE APPROPRIATE AREA AS DIRECTED 2 (TWO) TIMES A DAY FOR 7 DAYS. 6/25/19   Fam Cardoso MD   busPIRone (BUSPAR) 7.5 MG tablet Take 7.5 mg by mouth 2 (Two) Times a Day.    Janie Bueno MD   DULoxetine (CYMBALTA) 60 MG capsule Take 60 mg by mouth Daily.    Janie Bueno MD   hydroCHLOROthiazide (HYDRODIURIL) 25 MG tablet Take 25 mg by mouth Daily.    Janie Bueno MD       Objective     Vital Signs: /92 (BP Location: Left arm, Patient Position: Sitting, Cuff Size: Adult)   Pulse 87   Temp 98.4 °F (36.9 °C) (Temporal)   Ht 180.3 cm (71\")   Wt 110 kg (243 lb)   SpO2 98%   BMI 33.89 kg/m²   Physical Exam   Constitutional: He appears well-developed and well-nourished.   HENT:   Head: Normocephalic and atraumatic.   Nose: Nose normal.   Mouth/Throat: Oropharynx is clear and moist.   Eyes: Conjunctivae and EOM are normal.   Neck: Normal range of motion. Neck supple.   Cardiovascular: Normal rate, regular rhythm and normal heart sounds.   Pulmonary/Chest: Effort normal and breath sounds normal.   Abdominal: Soft. Bowel sounds are normal.   Musculoskeletal: He exhibits no edema or tenderness.   Neurological: He is alert. No cranial nerve deficit.   Skin: Skin is warm and dry.   Right head scar and small nickel sized wound.    Psychiatric: He has a normal mood and affect.   Vitals reviewed.      Patient's Body mass index is 33.89 kg/m². BMI is within normal parameters. No follow-up required..      Results Reviewed:  Glucose   Date Value Ref Range Status   02/18/2020 79 74 - 109 mg/dL Final     BUN   Date Value Ref Range Status   02/18/2020 20 6 - 20 mg/dL Final     Creatinine   Date Value Ref Range Status "   02/18/2020 0.7 0.5 - 1.2 mg/dL Final     Sodium   Date Value Ref Range Status   02/18/2020 140 136 - 145 mmol/L Final     Potassium   Date Value Ref Range Status   02/18/2020 4.4 3.5 - 5.0 mmol/L Final     Chloride   Date Value Ref Range Status   02/18/2020 102 98 - 111 mmol/L Final     CO2   Date Value Ref Range Status   02/18/2020 24 22 - 29 mmol/L Final     Calcium   Date Value Ref Range Status   02/18/2020 9.4 8.6 - 10.0 mg/dL Final     ALT (SGPT)   Date Value Ref Range Status   02/18/2020 8 5 - 41 U/L Final     AST (SGOT)   Date Value Ref Range Status   02/18/2020 12 5 - 40 U/L Final     WBC   Date Value Ref Range Status   02/18/2020 7.5 4.8 - 10.8 K/uL Final     Hematocrit   Date Value Ref Range Status   02/18/2020 39.5 (L) 42.0 - 52.0 % Final     Platelets   Date Value Ref Range Status   02/18/2020 281 130 - 400 K/uL Final     Triglycerides   Date Value Ref Range Status   02/18/2020 98 0 - 149 mg/dL Final     HDL Cholesterol   Date Value Ref Range Status   02/18/2020 44 (L) 55 - 121 mg/dL Final     Comment:     VALUES>60 MG/DL ARE ASSOCIATED WITH A DECREASED RISK OF  ATHEROSCLEROTIC CARDIOVASCULAR DISEASE     LDL Cholesterol    Date Value Ref Range Status   02/18/2020 77 <100 mg/dL Final     Comment:     <100 MG/DL=OPITIMAL    100-129 MG/DL=DESIRABLE    130-159 MG/DL BORDERLINE=INCREASED RISK OF ATHEROSCLEROTIC  CARDIOVASCULAR DISEASE    > OR = 160 MG/DL=ASSOCIATED WITH AN INCREASE RISK OF  ATHEROSCLEROTIC CARDIOVASCULAR DISEASE         Assessment / Plan     Assessment/Plan:  1. [place] (obstructive sleep apnea)  - Home Sleep Study; Future    2. Anemia, unspecified type  - Ambulatory Referral to Gastroenterology  - Iron  - Vitamin B12 and Folate  - Ferritin  - Reticulocytes    3. Vitamin D deficiency  - Vitamin D 25 hydroxy    4. Fatigue, unspecified type  - Testosterone    5. Chronic bilateral low back pain with left-sided sciatica  - gabapentin (Neurontin) 600 MG tablet; Take 1 tablet by mouth 3 (Three) Times  a Day.  Dispense: 90 tablet; Refill: 0    6. Other insomnia  - amitriptyline (ELAVIL) 25 MG tablet; Take 1 tablet by mouth Every Night.  Dispense: 30 tablet; Refill: 1        Return in about 3 months (around 6/17/2020) for Recheck, Next scheduled follow up. unless patient needs to be seen sooner or acute issues arise.    Code Status: Full    I have discussed the patient results/orders and and plan/recommendation with them at today's visit.      Nella Flores, DO   03/17/2020

## 2020-03-18 LAB
25(OH)D3+25(OH)D2 SERPL-MCNC: 26.9 NG/ML (ref 30–100)
FERRITIN SERPL-MCNC: 172 NG/ML (ref 30–400)
FOLATE SERPL-MCNC: 4.4 NG/ML
IRON SERPL-MCNC: 52 UG/DL (ref 38–169)
RETICS/RBC NFR AUTO: 1.2 % (ref 0.6–2.6)
TESTOST SERPL-MCNC: 155 NG/DL (ref 264–916)
VIT B12 SERPL-MCNC: 401 PG/ML (ref 232–1245)

## 2020-03-19 NOTE — PROGRESS NOTES
Please let patient know that Vitamin D and Testosterone levels are low. Please take OTC Vitamin D and we will need one more set of labs and if normal can start testosterone replacement. Let me know when he'd like to get the labs done and I will put in.

## 2020-03-23 DIAGNOSIS — R79.89 LOW TESTOSTERONE: Primary | ICD-10-CM

## 2020-03-26 ENCOUNTER — OFFICE VISIT (OUTPATIENT)
Dept: GASTROENTEROLOGY | Facility: CLINIC | Age: 55
End: 2020-03-26

## 2020-03-26 VITALS
TEMPERATURE: 98.2 F | HEART RATE: 88 BPM | SYSTOLIC BLOOD PRESSURE: 128 MMHG | HEIGHT: 71 IN | WEIGHT: 243 LBS | OXYGEN SATURATION: 99 % | DIASTOLIC BLOOD PRESSURE: 82 MMHG | BODY MASS INDEX: 34.02 KG/M2

## 2020-03-26 DIAGNOSIS — Z12.11 COLON CANCER SCREENING: ICD-10-CM

## 2020-03-26 DIAGNOSIS — R19.5 DARK STOOLS: ICD-10-CM

## 2020-03-26 DIAGNOSIS — T39.395S ADVERSE EFFECT OF NON-STEROIDAL ANTI-INFLAMMATORY DRUG (NSAID), SEQUELA: ICD-10-CM

## 2020-03-26 DIAGNOSIS — D64.9 ANEMIA, UNSPECIFIED TYPE: Primary | ICD-10-CM

## 2020-03-26 DIAGNOSIS — R12 HEARTBURN: ICD-10-CM

## 2020-03-26 PROBLEM — T39.395A ADVERSE REACTION TO NON-STEROIDAL ANTI-INFLAMMATORY DRUG (NSAID): Status: ACTIVE | Noted: 2020-03-26

## 2020-03-26 PROCEDURE — 99214 OFFICE O/P EST MOD 30 MIN: CPT | Performed by: NURSE PRACTITIONER

## 2020-03-26 RX ORDER — SUCRALFATE 1 G/1
1 TABLET ORAL 4 TIMES DAILY
Qty: 120 TABLET | Refills: 0 | Status: SHIPPED | OUTPATIENT
Start: 2020-03-26 | End: 2021-04-08

## 2020-03-26 RX ORDER — PANTOPRAZOLE SODIUM 40 MG/1
40 TABLET, DELAYED RELEASE ORAL DAILY
Qty: 30 TABLET | Refills: 11 | Status: SHIPPED | OUTPATIENT
Start: 2020-03-26 | End: 2021-02-24

## 2020-03-26 RX ORDER — SODIUM, POTASSIUM,MAG SULFATES 17.5-3.13G
1 SOLUTION, RECONSTITUTED, ORAL ORAL EVERY 12 HOURS
Qty: 2 BOTTLE | Refills: 0 | COMMUNITY
Start: 2020-03-26 | End: 2021-04-08

## 2020-03-26 NOTE — PROGRESS NOTES
"Chief Complaint:   Chief Complaint   Patient presents with   • Anemia     Pt had labs through PCP's office that showed he is anemic; Pt states he has noticed his stools have been darker occasionally over the last 6 months         Patient ID: Raimundo Ramos is a 54 y.o. male     History of Present Illness: This is a very pleasant 54 year old man here today with referral for anemia.    He states that he started seeing dark stools \"but not black.\" He states he has some heartburn. Advil 800 mg twice daily for about 8 years. Was seen by PCP and found to have anemia.CBC on 2/18/2020 revealed hemoglobin of 12.0, CMP unremarkable.  On 3/17/2020 iron was 52, ferritin 172, folate 4.4.  CBC on 5/27/2019 11.8. He has not an EGD or colonoscopy in the past.     The patient denies any nausea, vomiting, epigastric pain, dysphagia,  or hematemesis.  The patient denies any fever or chills.  Denies any melena or hematochezia.  Denies any unintentional weight loss or loss of appetite. No known family history of colon cancer or colon polyps      Past Medical History:   Diagnosis Date   • Anxiety    • Depression    • Elevated cholesterol    • Hypertension        Past Surgical History:   Procedure Laterality Date   • WOUND CLOSURE Right 5/28/2019    Procedure: Complex closure of open scalp wound avulsion defect 7x 4.5 cm with rotation/ advancement flap closure 9cm x 5 cm;  Surgeon: Fam Cardoso MD;  Location: [facility];  Service: ENT         Current Outpatient Medications:   •  acetaminophen (TYLENOL) 325 MG tablet, Take 2 tablets by mouth Every 4 (Four) Hours As Needed for Mild Pain ., Disp: , Rfl:   •  amitriptyline (ELAVIL) 25 MG tablet, Take 1 tablet by mouth Every Night., Disp: 30 tablet, Rfl: 1  •  amLODIPine (NORVASC) 5 MG tablet, Take 5 mg by mouth Daily., Disp: , Rfl:   •  ARIPiprazole (ABILIFY) 5 MG tablet, Take 5 mg by mouth Daily., Disp: , Rfl:   •  Bacitracin-Polymyxin B ([employer] POLY BACITRACIN) 500-62138 UNIT/GM ointment, " APPLY TOPICALLY TO THE APPROPRIATE AREA AS DIRECTED 2 (TWO) TIMES A DAY FOR 7 DAYS., Disp: 28 g, Rfl: 0  •  buprenorphine-naloxone (SUBOXONE) 8-2 MG per SL tablet, Place 1 tablet under the tongue Daily., Disp: , Rfl:   •  DULoxetine (CYMBALTA) 60 MG capsule, Take 60 mg by mouth Daily., Disp: , Rfl:   •  fluticasone (FLONASE) 50 MCG/ACT nasal spray, 2 sprays into the nostril(s) as directed by provider Daily., Disp: , Rfl:   •  gabapentin (Neurontin) 600 MG tablet, Take 1 tablet by mouth 3 (Three) Times a Day., Disp: 90 tablet, Rfl: 0  •  hydroCHLOROthiazide (HYDRODIURIL) 25 MG tablet, Take 25 mg by mouth Daily., Disp: , Rfl:   •  ibuprofen (ADVIL,MOTRIN) 800 MG tablet, Take 800 mg by mouth 2 (Two) Times a Day., Disp: , Rfl:   •  lisinopril (PRINIVIL,ZESTRIL) 10 MG tablet, Take 40 mg by mouth Daily., Disp: , Rfl:   •  Loratadine (CLARITIN PO), Take 10 mg by mouth Daily., Disp: , Rfl:   •  nicotine (NICODERM CQ) 21 MG/24HR patch, Place 21 patches on the skin as directed by provider Daily., Disp: , Rfl:   •  Omega-3 Fatty Acids (FISH OIL) 1000 MG capsule capsule, Take 1,000 mg by mouth Daily With Breakfast., Disp: , Rfl:   •  pantoprazole (Protonix) 40 MG EC tablet, Take 1 tablet by mouth Daily., Disp: 30 tablet, Rfl: 11  •  sodium-potassium-magnesium sulfates (Suprep Bowel Prep Kit) 17.5-3.13-1.6 GM/177ML solution oral solution, Take 1 bottle by mouth Every 12 (Twelve) Hours. Split dose prep as directed by office instructions provided.  2 bottles = one kit., Disp: 2 bottle, Rfl: 0  •  sucralfate (Carafate) 1 g tablet, Take 1 tablet by mouth 4 (Four) Times a Day., Disp: 120 tablet, Rfl: 0    No Known Allergies    Social History     Socioeconomic History   • Marital status:      Spouse name: Not on file   • Number of children: Not on file   • Years of education: Not on file   • Highest education level: Not on file   Tobacco Use   • Smoking status: Current Every Day Smoker     Packs/day: 0.50     Types: Cigarettes  "  • Smokeless tobacco: Never Used   Substance and Sexual Activity   • Alcohol use: Not Currently     Comment: Quit 2017   • Drug use: No   • Sexual activity: Defer       Family History   Problem Relation Age of Onset   • Diabetes Mother    • Hypertension Mother    • Hyperlipidemia Mother    • Hypertension Father    • Stroke Maternal Grandfather    • Colon cancer Neg Hx    • Colon polyps Neg Hx    • Esophageal cancer Neg Hx    • Liver cancer Neg Hx    • Liver disease Neg Hx    • Rectal cancer Neg Hx    • Stomach cancer Neg Hx        Vitals:    03/26/20 0950   BP: 128/82   BP Location: Left arm   Patient Position: Sitting   Cuff Size: Adult   Pulse: 88   Temp: 98.2 °F (36.8 °C)   TempSrc: Tympanic   SpO2: 99%   Weight: 110 kg (243 lb)   Height: 180.3 cm (71\")       Review of Systems:    General:    Present -feeling well   Skin:    Not Present-Rash   HEENT:     Not Present-Acute visual changes or Acute hearing changes   Neck :    Not Present- swollen glands   Genitourinary:      Not Present- burning, frequency, urgency hematuria, dysuria,   Cardiovascular:   Not Present-chest pain, palpitations, or pressure   Respiratory:   Not Present- shortness of breath or cough   Gastrointestinal:  Musculoskeletal:  Neurological:  Psychiatric:   Present as mentioned in the HP    Not Present. Recent gait disturbances.    Not Present-Seizures and weakness in extremities.    Not Present- Anxiety or Depression.       Physical Exam:    General Appearance:    Alert, cooperative, in no acute distress   Psych:    Mood appropriate    Eyes:          conjunctivae and sclerae normal, no   icterus, no pallor   ENMT:    Ears appear intact with no abnormalities noted oral mucosa moist   Neck:   No adenopathy, supple, trachea midline, no thyromegaly, no   carotid bruit, no JVD    Cardiovascular:    Regular rhythm and normal rate, normal S1 and S2, no            murmur, no gallop, no rub, no click   Gastrointestinal:     Inspection normal.  Normal " bowel sounds, no masses, no organomegaly, soft round non-tender, non-distended, no guarding, no rebound or tenderness. No hepatosplenomegaly.   Skin:   No bleeding, bruising or rash   Neurologic:   nonfocal       Lab Results   Component Value Date    WBC 7.5 02/18/2020    WBC 15.71 (H) 05/27/2019    HGB 12.0 (L) 02/18/2020    HGB 11.8 (L) 05/27/2019    HCT 39.5 (L) 02/18/2020    HCT 35.2 (L) 05/27/2019     02/18/2020     05/27/2019        Lab Results   Component Value Date     02/18/2020     05/27/2019    K 4.4 02/18/2020    K 4.2 05/27/2019     02/18/2020     05/27/2019    CO2 24 02/18/2020    CO2 22.0 (L) 05/27/2019    BUN 20 02/18/2020    BUN 16 05/27/2019    CREATININE 0.7 02/18/2020    CREATININE 0.92 05/27/2019    BILITOT <0.2 02/18/2020    BILITOT 0.4 05/27/2019    ALKPHOS 86 02/18/2020    ALKPHOS 84 05/27/2019    ALT 8 02/18/2020    ALT 16 05/27/2019    AST 12 02/18/2020    AST 42 05/27/2019    GLUCOSE 79 02/18/2020    GLUCOSE 110 (H) 05/27/2019       Lab Results   Component Value Date    INR 1.00 05/27/2019       Body mass index is 33.89 kg/m². Patient's Body mass index is 33.89 kg/m². BMI is above normal parameters. Recommendations include: nutrition counseling.    Assessment and Plan:  Assessment/Plan   Raimundo was seen today for anemia.    Diagnoses and all orders for this visit:    Anemia, unspecified type  Comments:  Schedule EGD and colonoscopy in future. This is no acute problem during COVID-N19 outbreak  Orders:  -     Case Request; Standing  -     Case Request    Dark stools  -     Case Request; Standing  -     Case Request    Heartburn  Comments:  initiate Protonix and Carafate  Orders:  -     Case Request; Standing  -     Case Request    Adverse effect of non-steroidal anti-inflammatory drug (NSAID), sequela  Comments:  instructed to disconitinue all NSAIDs and use tylenol only  Orders:  -     Case Request; Standing  -     Case Request    Colon cancer  screening  -     Case Request; Standing  -     Case Request    Other orders  -     pantoprazole (Protonix) 40 MG EC tablet; Take 1 tablet by mouth Daily.  -     sucralfate (Carafate) 1 g tablet; Take 1 tablet by mouth 4 (Four) Times a Day.  -     Follow Anesthesia Guidelines / Protocol; Future  -     Obtain Informed Consent; Future  -     sodium-potassium-magnesium sulfates (Suprep Bowel Prep Kit) 17.5-3.13-1.6 GM/177ML solution oral solution; Take 1 bottle by mouth Every 12 (Twelve) Hours. Split dose prep as directed by office instructions provided.  2 bottles = one kit.        The risks, benefits, and alternatives of endoscopy were reviewed with the patient today.  Risks including perforation, with or without dilation, possibly requiring surgery.  Additional risks include risk of bleeding.  There is also the risk of a drug reaction or problems with anesthesia.  This will be discussed with the further by the anesthesia team on the day of the procedure. The benefits include the diagnosis and management of disease of the upper digestive tract.  Alternatives to endoscopy include upper GI series, laboratory testing, radiographic evaluation, or no intervention.  The patient verbalizes understanding and agrees.    The risks, benefits, and alternatives of colonoscopy were reviewed with the patient today.  Risks including perforation of the colon possibly requiring surgery or colostomy.  Additional risks include risk of bleeding from biopsies or removal of colon tissue.  There is also the risk of a drug reaction or problems with anesthesia.  This will be discussed with the further by the anesthesia team on the day of the procedure.  Lastly there is a possibility of missing a colon polyp or cancer.  The benefits include the diagnosis and management of disease of the colon and rectum.  Alternatives to colonoscopy include barium enema, laboratory testing, radiographic evaluation, or no intervention.  The patient verbalizes  understanding and agrees.       There are no Patient Instructions on file for this visit.    Next follow-up appointment          EMR Dragon/Transcription disclaimer:  Much of this encounter note is an electronic transcription/translation of spoken language to printed text. The electronic translation of spoken language may permit erroneous, or at times, nonsensical words or phrases to be inadvertently transcribed; although I have reviewed the note for such errors, some may still exist.

## 2020-04-15 DIAGNOSIS — G89.29 CHRONIC BILATERAL LOW BACK PAIN WITH LEFT-SIDED SCIATICA: ICD-10-CM

## 2020-04-15 DIAGNOSIS — M54.42 CHRONIC BILATERAL LOW BACK PAIN WITH LEFT-SIDED SCIATICA: ICD-10-CM

## 2020-04-15 NOTE — TELEPHONE ENCOUNTER
PATIENT NEEDS GABAPENTIN FILLED. I TOLD HIM THAT IT WAS DUE ON THE 17TH AND DR BERRY WOULD REFILL IT THEN.

## 2020-04-16 RX ORDER — GABAPENTIN 600 MG/1
600 TABLET ORAL 3 TIMES DAILY
Qty: 90 TABLET | Refills: 0 | Status: SHIPPED | OUTPATIENT
Start: 2020-04-16 | End: 2020-05-18 | Stop reason: SDUPTHER

## 2020-04-27 RX ORDER — SUCRALFATE 1 G/1
1 TABLET ORAL 4 TIMES DAILY
Qty: 120 TABLET | Refills: 0 | OUTPATIENT
Start: 2020-04-27

## 2020-05-01 ENCOUNTER — TELEPHONE (OUTPATIENT)
Dept: GASTROENTEROLOGY | Facility: CLINIC | Age: 55
End: 2020-05-01

## 2020-05-01 NOTE — TELEPHONE ENCOUNTER
Pt was scheduled prior to requirement of Covid-19 before any procedures. I called today to discus with him-advised him he will have to be tested within 72 hours of his procedure and will have to self isolate after being tested-he KELSI. I also advised someone from scheduling will be calling him to arrange that test and that they could give him further info.  Pt advised to call me back with any further questions/problems.

## 2020-05-05 DIAGNOSIS — G47.09 OTHER INSOMNIA: ICD-10-CM

## 2020-05-05 RX ORDER — AMITRIPTYLINE HYDROCHLORIDE 25 MG/1
25 TABLET, FILM COATED ORAL NIGHTLY
Qty: 30 TABLET | Refills: 1 | Status: SHIPPED | OUTPATIENT
Start: 2020-05-05 | End: 2020-06-25 | Stop reason: SDUPTHER

## 2020-05-07 ENCOUNTER — TRANSCRIBE ORDERS (OUTPATIENT)
Dept: ADMINISTRATIVE | Facility: HOSPITAL | Age: 55
End: 2020-05-07

## 2020-05-07 DIAGNOSIS — Z01.818 PRE-OP TESTING: Primary | ICD-10-CM

## 2020-05-12 ENCOUNTER — TELEPHONE (OUTPATIENT)
Dept: GASTROENTEROLOGY | Facility: CLINIC | Age: 55
End: 2020-05-12

## 2020-05-12 NOTE — TELEPHONE ENCOUNTER
Pt was on the schedule for 5/13/20 for endo/colon and did not have covid test. I called pt and he stated that he had to go out of town and would be back in town Sunday. He is rescheduled for 5/20/20 and will need his covid testing and quarantine Monday 5/18/20. I also explained to patient that times are limited for scopes and that if he sees that he will not be able to have the test to let us know as soon as possible. Pt KELSI.

## 2020-05-14 ENCOUNTER — TRANSCRIBE ORDERS (OUTPATIENT)
Dept: ADMINISTRATIVE | Facility: HOSPITAL | Age: 55
End: 2020-05-14

## 2020-05-14 DIAGNOSIS — Z01.818 PRE-OP TESTING: Primary | ICD-10-CM

## 2020-05-18 ENCOUNTER — TELEPHONE (OUTPATIENT)
Dept: INTERNAL MEDICINE | Facility: CLINIC | Age: 55
End: 2020-05-18

## 2020-05-18 ENCOUNTER — TRANSCRIBE ORDERS (OUTPATIENT)
Dept: GASTROENTEROLOGY | Facility: CLINIC | Age: 55
End: 2020-05-18

## 2020-05-18 DIAGNOSIS — G89.29 CHRONIC BILATERAL LOW BACK PAIN WITH LEFT-SIDED SCIATICA: ICD-10-CM

## 2020-05-18 DIAGNOSIS — M54.42 CHRONIC BILATERAL LOW BACK PAIN WITH LEFT-SIDED SCIATICA: ICD-10-CM

## 2020-05-18 DIAGNOSIS — Z01.818 PREOP TESTING: Primary | ICD-10-CM

## 2020-05-18 RX ORDER — GABAPENTIN 600 MG/1
600 TABLET ORAL 3 TIMES DAILY
Qty: 90 TABLET | Refills: 0 | Status: SHIPPED | OUTPATIENT
Start: 2020-05-18 | End: 2020-06-18 | Stop reason: SDUPTHER

## 2020-05-18 NOTE — TELEPHONE ENCOUNTER
Raimundo needs his Neurontin refilled. His last office visit was on 3/17/20. Would you like for him to do an office or video visit before it is refilled?

## 2020-05-19 ENCOUNTER — TELEPHONE (OUTPATIENT)
Dept: GASTROENTEROLOGY | Facility: CLINIC | Age: 55
End: 2020-05-19

## 2020-05-26 ENCOUNTER — HOSPITAL ENCOUNTER (OUTPATIENT)
Dept: SLEEP MEDICINE | Facility: HOSPITAL | Age: 55
End: 2020-05-26

## 2020-05-28 ENCOUNTER — HOSPITAL ENCOUNTER (OUTPATIENT)
Dept: SLEEP MEDICINE | Facility: HOSPITAL | Age: 55
Discharge: HOME | End: 2020-05-28

## 2020-05-28 DIAGNOSIS — G47.33 OSA (OBSTRUCTIVE SLEEP APNEA): ICD-10-CM

## 2020-05-28 PROCEDURE — 95806 SLEEP STUDY UNATT&RESP EFFT: CPT

## 2020-06-18 ENCOUNTER — TRANSCRIBE ORDERS (OUTPATIENT)
Dept: LAB | Facility: HOSPITAL | Age: 55
End: 2020-06-18

## 2020-06-18 ENCOUNTER — TELEPHONE (OUTPATIENT)
Dept: INTERNAL MEDICINE | Facility: CLINIC | Age: 55
End: 2020-06-18

## 2020-06-18 ENCOUNTER — OFFICE VISIT (OUTPATIENT)
Dept: INTERNAL MEDICINE | Facility: CLINIC | Age: 55
End: 2020-06-18

## 2020-06-18 VITALS
BODY MASS INDEX: 35 KG/M2 | HEIGHT: 71 IN | SYSTOLIC BLOOD PRESSURE: 146 MMHG | TEMPERATURE: 97.8 F | DIASTOLIC BLOOD PRESSURE: 83 MMHG | OXYGEN SATURATION: 98 % | WEIGHT: 250 LBS | HEART RATE: 78 BPM

## 2020-06-18 DIAGNOSIS — M19.90 ARTHRITIS: ICD-10-CM

## 2020-06-18 DIAGNOSIS — M54.42 CHRONIC BILATERAL LOW BACK PAIN WITH LEFT-SIDED SCIATICA: ICD-10-CM

## 2020-06-18 DIAGNOSIS — I10 ESSENTIAL HYPERTENSION: Primary | ICD-10-CM

## 2020-06-18 DIAGNOSIS — J30.2 SEASONAL ALLERGIES: ICD-10-CM

## 2020-06-18 DIAGNOSIS — Z01.818 PRE-OP TESTING: Primary | ICD-10-CM

## 2020-06-18 DIAGNOSIS — G47.00 INSOMNIA, UNSPECIFIED TYPE: ICD-10-CM

## 2020-06-18 DIAGNOSIS — G89.29 CHRONIC BILATERAL LOW BACK PAIN WITH LEFT-SIDED SCIATICA: ICD-10-CM

## 2020-06-18 PROCEDURE — 99214 OFFICE O/P EST MOD 30 MIN: CPT | Performed by: INTERNAL MEDICINE

## 2020-06-18 RX ORDER — DIAZEPAM 5 MG/1
5 TABLET ORAL NIGHTLY PRN
Qty: 30 TABLET | Refills: 0 | Status: SHIPPED | OUTPATIENT
Start: 2020-06-18 | End: 2021-04-08

## 2020-06-18 RX ORDER — LISINOPRIL 40 MG/1
40 TABLET ORAL DAILY
Qty: 30 TABLET | Refills: 5 | Status: SHIPPED | OUTPATIENT
Start: 2020-06-18 | End: 2021-03-16 | Stop reason: SDUPTHER

## 2020-06-18 RX ORDER — FLUTICASONE PROPIONATE 50 MCG
2 SPRAY, SUSPENSION (ML) NASAL DAILY
Qty: 1 BOTTLE | Refills: 3 | Status: SHIPPED | OUTPATIENT
Start: 2020-06-18 | End: 2020-10-13 | Stop reason: SDUPTHER

## 2020-06-18 RX ORDER — GABAPENTIN 600 MG/1
600 TABLET ORAL 3 TIMES DAILY
Qty: 90 TABLET | Refills: 0 | Status: SHIPPED | OUTPATIENT
Start: 2020-06-18 | End: 2020-07-18 | Stop reason: SDUPTHER

## 2020-06-18 RX ORDER — LISINOPRIL 40 MG/1
40 TABLET ORAL DAILY
COMMUNITY
Start: 2020-06-01 | End: 2020-06-18 | Stop reason: SDUPTHER

## 2020-06-18 RX ORDER — IBUPROFEN 800 MG/1
800 TABLET ORAL 2 TIMES DAILY
Qty: 60 TABLET | Refills: 5 | Status: SHIPPED | OUTPATIENT
Start: 2020-06-18 | End: 2020-12-29 | Stop reason: SDUPTHER

## 2020-06-18 NOTE — PROGRESS NOTES
Subjective     Chief Complaint   Patient presents with   • Med Refill   • Follow-up       History of Present Illness  Patient states that his daughter is moving back home, and he is very excited.      Patient states that falling asleep isn't the problem that staying asleep is the problem. Usually up every night on the hour..     Colonoscopy is scheduled for the 29th of this month.   Attempting to get sleep study results.     States that anxiety is driving him insane. The older that he gets the worse it gets.     States that he slipped and fell about 3 days ago. Fell down the steps. Wet steps. States that he is a little sore.       Patient's PMR from outside medical facility reviewed and noted.    Review of Systems   Constitutional: Negative for chills and fever.   HENT: Positive for congestion. Negative for mouth sores.    Respiratory: Negative for cough and shortness of breath.    Cardiovascular: Negative for chest pain and leg swelling.   Gastrointestinal: Negative for constipation, diarrhea, nausea and vomiting.   Genitourinary: Negative for dysuria and hematuria.   Neurological: Negative for seizures.        Occasional HA with lack of sleep.   Psychiatric/Behavioral: Positive for sleep disturbance. Negative for self-injury and suicidal ideas. The patient is nervous/anxious.         Otherwise complete ROS reviewed and negative except as mentioned in the HPI.    Past Medical History:   Past Medical History:   Diagnosis Date   • Anxiety    • Depression    • Elevated cholesterol    • Hypertension      Past Surgical History:  Past Surgical History:   Procedure Laterality Date   • WOUND CLOSURE Right 5/28/2019    Procedure: Complex closure of open scalp wound avulsion defect 7x 4.5 cm with rotation/ advancement flap closure 9cm x 5 cm;  Surgeon: Fam Cardoso MD;  Location: [facility];  Service: ENT     Social History:  reports that he has been smoking cigarettes. He has been smoking about 0.50 packs  per day. He has never used smokeless tobacco. He reports that he drank alcohol. He reports that he does not use drugs.    Family History: family history includes Diabetes in his mother; Hyperlipidemia in his mother; Hypertension in his father and mother; Stroke in his maternal grandfather.       Allergies:  No Known Allergies  Medications:  Prior to Admission medications    Medication Sig Start Date End Date Taking? Authorizing Provider   acetaminophen (TYLENOL) 325 MG tablet Take 2 tablets by mouth Every 4 (Four) Hours As Needed for Mild Pain . 5/28/19  Yes Fam Cardoso MD   amitriptyline (ELAVIL) 25 MG tablet Take 1 tablet by mouth Every Night. 5/5/20  Yes Nella Folres DO   amLODIPine (NORVASC) 5 MG tablet Take 5 mg by mouth Daily.   Yes Janie Bueno MD   ARIPiprazole (ABILIFY) 5 MG tablet Take 5 mg by mouth Daily.   Yes Janie Bueno MD   buprenorphine-naloxone (SUBOXONE) 8-2 MG per SL tablet Place 1 tablet under the tongue Daily.   Yes Janie Bueno MD   DULoxetine (CYMBALTA) 60 MG capsule Take 60 mg by mouth Daily.   Yes Janie Bueno MD   fluticasone (FLONASE) 50 MCG/ACT nasal spray 2 sprays into the nostril(s) as directed by provider Daily.   Yes Janie Bueno MD   gabapentin (Neurontin) 600 MG tablet Take 1 tablet by mouth 3 (Three) Times a Day. 5/18/20  Yes Nella Flores DO   ibuprofen (ADVIL,MOTRIN) 800 MG tablet Take 800 mg by mouth 2 (Two) Times a Day. 11/26/19  Yes Janie Bueno MD   lisinopril (PRINIVIL,ZESTRIL) 40 MG tablet Take 40 mg by mouth Daily. 6/1/20  Yes Janie Bueno MD   Loratadine (CLARITIN PO) Take 10 mg by mouth Daily.   Yes Janie Bueno MD   nicotine (NICODERM CQ) 21 MG/24HR patch Place 21 patches on the skin as directed by provider Daily. 1/7/20  Yes Janie Bueno MD   Omega-3 Fatty Acids (FISH OIL) 1000 MG capsule capsule Take 1,000 mg by mouth Daily With Breakfast.   Yes Myles,  "MD Janie   sucralfate (Carafate) 1 g tablet Take 1 tablet by mouth 4 (Four) Times a Day. 3/26/20  Yes Leida Amador APRN   Bacitracin-Polymyxin B ([employer] POLY BACITRACIN) 500-18010 UNIT/GM ointment APPLY TOPICALLY TO THE APPROPRIATE AREA AS DIRECTED 2 (TWO) TIMES A DAY FOR 7 DAYS. 6/25/19   Fam Cardoso MD   hydroCHLOROthiazide (HYDRODIURIL) 25 MG tablet Take 25 mg by mouth Daily.    ProviderJanie MD   pantoprazole (Protonix) 40 MG EC tablet Take 1 tablet by mouth Daily. 3/26/20   Leida Amador APRN   sodium-potassium-magnesium sulfates (Suprep Bowel Prep Kit) 17.5-3.13-1.6 GM/177ML solution oral solution Take 1 bottle by mouth Every 12 (Twelve) Hours. Split dose prep as directed by office instructions provided.  2 bottles = one kit. 3/26/20   Leida Amador APRN   lisinopril (PRINIVIL,ZESTRIL) 10 MG tablet Take 40 mg by mouth Daily.  6/18/20  ProviderJanie MD       Objective     Vital Signs: /83 (BP Location: Left arm, Patient Position: Sitting, Cuff Size: Large Adult)   Pulse 78   Temp 97.8 °F (36.6 °C) (Skin)   Ht 180.3 cm (71\")   Wt 113 kg (250 lb)   SpO2 98%   BMI 34.87 kg/m²   Physical Exam   Constitutional: He appears well-developed and well-nourished.   HENT:   Head: Normocephalic and atraumatic.   Nose: Nose normal.   Mouth/Throat: Oropharynx is clear and moist.   Eyes: Conjunctivae and EOM are normal.   Neck: Normal range of motion. Neck supple.   Cardiovascular: Normal rate, regular rhythm and normal heart sounds.   Pulmonary/Chest: Effort normal and breath sounds normal.   Abdominal: Soft. Bowel sounds are normal.   Musculoskeletal: He exhibits no edema or tenderness.   Neurological: He is alert. No cranial nerve deficit.   Skin: Skin is warm and dry.   Psychiatric: He has a normal mood and affect.   Vitals reviewed.      Patient's Body mass index is 34.87 kg/m². BMI is above normal parameters. Recommendations include: nutrition counseling.      Results " Reviewed:  Glucose   Date Value Ref Range Status   02/18/2020 79 74 - 109 mg/dL Final     BUN   Date Value Ref Range Status   02/18/2020 20 6 - 20 mg/dL Final     Creatinine   Date Value Ref Range Status   02/18/2020 0.7 0.5 - 1.2 mg/dL Final     Sodium   Date Value Ref Range Status   02/18/2020 140 136 - 145 mmol/L Final     Potassium   Date Value Ref Range Status   02/18/2020 4.4 3.5 - 5.0 mmol/L Final     Chloride   Date Value Ref Range Status   02/18/2020 102 98 - 111 mmol/L Final     CO2   Date Value Ref Range Status   02/18/2020 24 22 - 29 mmol/L Final     Calcium   Date Value Ref Range Status   02/18/2020 9.4 8.6 - 10.0 mg/dL Final     ALT (SGPT)   Date Value Ref Range Status   02/18/2020 8 5 - 41 U/L Final     AST (SGOT)   Date Value Ref Range Status   02/18/2020 12 5 - 40 U/L Final     WBC   Date Value Ref Range Status   02/18/2020 7.5 4.8 - 10.8 K/uL Final     Hematocrit   Date Value Ref Range Status   02/18/2020 39.5 (L) 42.0 - 52.0 % Final     Platelets   Date Value Ref Range Status   02/18/2020 281 130 - 400 K/uL Final     Triglycerides   Date Value Ref Range Status   02/18/2020 98 0 - 149 mg/dL Final     HDL Cholesterol   Date Value Ref Range Status   02/18/2020 44 (L) 55 - 121 mg/dL Final     Comment:     VALUES>60 MG/DL ARE ASSOCIATED WITH A DECREASED RISK OF  ATHEROSCLEROTIC CARDIOVASCULAR DISEASE     LDL Cholesterol    Date Value Ref Range Status   02/18/2020 77 <100 mg/dL Final     Comment:     <100 MG/DL=OPITIMAL    100-129 MG/DL=DESIRABLE    130-159 MG/DL BORDERLINE=INCREASED RISK OF ATHEROSCLEROTIC  CARDIOVASCULAR DISEASE    > OR = 160 MG/DL=ASSOCIATED WITH AN INCREASE RISK OF  ATHEROSCLEROTIC CARDIOVASCULAR DISEASE         Assessment / Plan     Assessment/Plan:  1. Chronic bilateral low back pain with left-sided sciatica  - gabapentin (Neurontin) 600 MG tablet; Take 1 tablet by mouth 3 (Three) Times a Day.  Dispense: 90 tablet; Refill: 0    2. Essential hypertension  - lisinopril  (PRINIVIL,ZESTRIL) 40 MG tablet; Take 1 tablet by mouth Daily.  Dispense: 30 tablet; Refill: 5    3. Seasonal allergies  - fluticasone (FLONASE) 50 MCG/ACT nasal spray; 2 sprays into the nostril(s) as directed by provider Daily.  Dispense: 1 bottle; Refill: 3    4. Arthritis  - ibuprofen (ADVIL,MOTRIN) 800 MG tablet; Take 1 tablet by mouth 2 (Two) Times a Day.  Dispense: 60 tablet; Refill: 5    5. Insomnia, unspecified type  - diazePAM (Valium) 5 MG tablet; Take 1 tablet by mouth At Night As Needed for Anxiety or Sleep.  Dispense: 30 tablet; Refill: 0  - DC elavil       Return in about 3 months (around 9/18/2020). unless patient needs to be seen sooner or acute issues arise.    Code Status: Full    I have discussed the patient results/orders and and plan/recommendation with them at today's visit.      Nella Flores, DO   06/18/2020

## 2020-06-19 ENCOUNTER — TELEPHONE (OUTPATIENT)
Dept: INTERNAL MEDICINE | Facility: CLINIC | Age: 55
End: 2020-06-19

## 2020-06-19 DIAGNOSIS — G47.33 OSA (OBSTRUCTIVE SLEEP APNEA): Primary | ICD-10-CM

## 2020-06-25 DIAGNOSIS — G47.09 OTHER INSOMNIA: ICD-10-CM

## 2020-06-25 DIAGNOSIS — G47.33 OSA (OBSTRUCTIVE SLEEP APNEA): ICD-10-CM

## 2020-06-25 DIAGNOSIS — G47.09 OTHER INSOMNIA: Primary | ICD-10-CM

## 2020-06-25 RX ORDER — AMITRIPTYLINE HYDROCHLORIDE 25 MG/1
25 TABLET, FILM COATED ORAL NIGHTLY
Qty: 30 TABLET | Refills: 1 | Status: SHIPPED | OUTPATIENT
Start: 2020-06-25 | End: 2020-08-24 | Stop reason: SDUPTHER

## 2020-06-29 ENCOUNTER — TELEPHONE (OUTPATIENT)
Dept: INTERNAL MEDICINE | Facility: CLINIC | Age: 55
End: 2020-06-29

## 2020-06-29 DIAGNOSIS — G47.09 OTHER INSOMNIA: Primary | ICD-10-CM

## 2020-06-29 NOTE — TELEPHONE ENCOUNTER
Patient called stating that he got in trouble at the [facility] clinic today for taking Valium. It is not one of the medications that he can take. They suggested that he take Trazodone instead.    Please Advise.

## 2020-06-30 RX ORDER — TRAZODONE HYDROCHLORIDE 100 MG/1
100 TABLET ORAL NIGHTLY
Qty: 30 TABLET | Refills: 0 | Status: SHIPPED | OUTPATIENT
Start: 2020-06-30 | End: 2020-08-10 | Stop reason: SDUPTHER

## 2020-07-02 ENCOUNTER — TRANSCRIBE ORDERS (OUTPATIENT)
Dept: SLEEP MEDICINE | Facility: HOSPITAL | Age: 55
End: 2020-07-02

## 2020-07-02 DIAGNOSIS — G47.33 OBSTRUCTIVE SLEEP APNEA, ADULT: Primary | ICD-10-CM

## 2020-07-07 ENCOUNTER — TRANSCRIBE ORDERS (OUTPATIENT)
Dept: ADMINISTRATIVE | Facility: HOSPITAL | Age: 55
End: 2020-07-07

## 2020-07-07 DIAGNOSIS — Z01.818 PREOP TESTING: Primary | ICD-10-CM

## 2020-07-14 ENCOUNTER — TELEPHONE (OUTPATIENT)
Dept: GASTROENTEROLOGY | Facility: CLINIC | Age: 55
End: 2020-07-14

## 2020-07-14 NOTE — TELEPHONE ENCOUNTER
"Pt states that he \"forgot\" to go get his COVID test yesterday for his Endo/Colon scheduled for 7/15/20.  He is rescheduled for procedure on 7/20/20. This is the 4th time pt has rescheduled.   "

## 2020-07-17 DIAGNOSIS — G89.29 CHRONIC BILATERAL LOW BACK PAIN WITH LEFT-SIDED SCIATICA: ICD-10-CM

## 2020-07-17 DIAGNOSIS — M54.42 CHRONIC BILATERAL LOW BACK PAIN WITH LEFT-SIDED SCIATICA: ICD-10-CM

## 2020-07-17 RX ORDER — GABAPENTIN 600 MG/1
600 TABLET ORAL 3 TIMES DAILY
Qty: 90 TABLET | Refills: 0 | Status: CANCELLED | OUTPATIENT
Start: 2020-07-17

## 2020-07-18 DIAGNOSIS — G89.29 CHRONIC BILATERAL LOW BACK PAIN WITH LEFT-SIDED SCIATICA: ICD-10-CM

## 2020-07-18 DIAGNOSIS — M54.42 CHRONIC BILATERAL LOW BACK PAIN WITH LEFT-SIDED SCIATICA: ICD-10-CM

## 2020-07-18 RX ORDER — GABAPENTIN 600 MG/1
600 TABLET ORAL 3 TIMES DAILY
Qty: 90 TABLET | Refills: 0 | Status: SHIPPED | OUTPATIENT
Start: 2020-07-18 | End: 2020-08-15 | Stop reason: SDUPTHER

## 2020-07-20 ENCOUNTER — TELEPHONE (OUTPATIENT)
Dept: GASTROENTEROLOGY | Facility: HOSPITAL | Age: 55
End: 2020-07-20

## 2020-07-20 NOTE — TELEPHONE ENCOUNTER
I tried to call pt multiple times on Friday 7/17/20 and he did not answer or return my calls. This is the 4th time pt has been scheduled for a procedure and he did not have his COVID test so I had to call and get him rescheduled. Today is the first time he no showed.

## 2020-07-20 NOTE — TELEPHONE ENCOUNTER
Letter to patient in appropriate format and copy to pcp as well:      Dear Raimundo Ramos,    I have personally reviewed your chart and I can see that you were scheduled to have both an endoscopy and colonoscopy due to anemia.  My office has scheduled this 4 times and you were scheduled for today, but you didn't keep your appointment.  I certainly understand that these are difficult times dealing with COVID-19, but I just need you to be aware that your risk of colon cancer remains increased due to your age and anemia.  Postponing a colonoscopy and endoscopy could delay diagnosis of either a precancer or cancer which could lead to increased consequences including surgery or death.      I will respect your wish to manage your care at this point.  My office will not harass you with any further letters or phone calls going forward as we have already done that.  Please call my office when you are ready to proceed.  I look forward to seeing you.    Sincerely,        Nirmala Orlando MD

## 2020-07-23 ENCOUNTER — CLINICAL SUPPORT (OUTPATIENT)
Dept: INTERNAL MEDICINE | Facility: CLINIC | Age: 55
End: 2020-07-23

## 2020-07-23 DIAGNOSIS — R79.89 LOW TESTOSTERONE: ICD-10-CM

## 2020-07-23 PROCEDURE — 36415 COLL VENOUS BLD VENIPUNCTURE: CPT | Performed by: INTERNAL MEDICINE

## 2020-07-23 NOTE — PROGRESS NOTES
Venipuncture Blood Specimen Collection  Venipuncture performed in the right ac by Radha Parker MA with good hemostasis. Patient tolerated the procedure well without complications.   07/23/20   Radha Parker MA

## 2020-07-24 LAB
FSH SERPL-ACNC: 5.4 MIU/ML (ref 1.5–12.4)
LH SERPL-ACNC: 3.4 MIU/ML (ref 1.7–8.6)

## 2020-08-10 DIAGNOSIS — G47.09 OTHER INSOMNIA: ICD-10-CM

## 2020-08-10 RX ORDER — TRAZODONE HYDROCHLORIDE 100 MG/1
100 TABLET ORAL NIGHTLY
Qty: 30 TABLET | Refills: 0 | Status: SHIPPED | OUTPATIENT
Start: 2020-08-10 | End: 2020-09-16 | Stop reason: SDUPTHER

## 2020-08-14 ENCOUNTER — HOSPITAL ENCOUNTER (OUTPATIENT)
Dept: SLEEP MEDICINE | Facility: HOSPITAL | Age: 55
Discharge: HOME | End: 2020-08-14

## 2020-08-15 DIAGNOSIS — G89.29 CHRONIC BILATERAL LOW BACK PAIN WITH LEFT-SIDED SCIATICA: ICD-10-CM

## 2020-08-15 DIAGNOSIS — M54.42 CHRONIC BILATERAL LOW BACK PAIN WITH LEFT-SIDED SCIATICA: ICD-10-CM

## 2020-08-15 NOTE — TELEPHONE ENCOUNTER
Pt called stating he needs refill for Trazodone and something else?  We just refilled his Trazodone on 08/10/20  He does have rx coming due on 08/17/2020 for Gabapentin,  I will put in for refill.   He does need to be seen in office in Sept.

## 2020-08-18 RX ORDER — GABAPENTIN 600 MG/1
600 TABLET ORAL 3 TIMES DAILY
Qty: 90 TABLET | Refills: 0 | Status: SHIPPED | OUTPATIENT
Start: 2020-08-18 | End: 2020-09-21

## 2020-08-24 DIAGNOSIS — G47.09 OTHER INSOMNIA: ICD-10-CM

## 2020-08-24 RX ORDER — AMITRIPTYLINE HYDROCHLORIDE 25 MG/1
25 TABLET, FILM COATED ORAL NIGHTLY
Qty: 30 TABLET | Refills: 1 | Status: SHIPPED | OUTPATIENT
Start: 2020-08-24 | End: 2020-11-03 | Stop reason: SDUPTHER

## 2020-08-25 RX ORDER — AMLODIPINE BESYLATE 5 MG/1
5 TABLET ORAL DAILY
Qty: 30 TABLET | Refills: 11 | Status: SHIPPED | OUTPATIENT
Start: 2020-08-25 | End: 2021-12-27

## 2020-08-25 NOTE — TELEPHONE ENCOUNTER
Received fax from pharmacy requesting refill on pts medication(s). Pt was last seen in office on 2/17/2020  and has a follow up scheduled for Visit date not found. Will send request to  [facility]  for authorization.      Requested Prescriptions     Pending Prescriptions Disp Refills    amLODIPine (NORVASC) 5 MG tablet [Pharmacy Med Name: AMLODIPINE BESYLATE 5 MG TAB] 30 tablet 11     Sig: Take 1 tablet by mouth daily

## 2020-09-16 DIAGNOSIS — G47.09 OTHER INSOMNIA: ICD-10-CM

## 2020-09-17 RX ORDER — TRAZODONE HYDROCHLORIDE 100 MG/1
100 TABLET ORAL NIGHTLY
Qty: 30 TABLET | Refills: 0 | Status: SHIPPED | OUTPATIENT
Start: 2020-09-17 | End: 2020-10-19 | Stop reason: SDUPTHER

## 2020-09-21 ENCOUNTER — OFFICE VISIT (OUTPATIENT)
Dept: INTERNAL MEDICINE | Facility: CLINIC | Age: 55
End: 2020-09-21

## 2020-09-21 VITALS
TEMPERATURE: 97.8 F | WEIGHT: 248 LBS | HEART RATE: 75 BPM | DIASTOLIC BLOOD PRESSURE: 80 MMHG | HEIGHT: 71 IN | BODY MASS INDEX: 34.72 KG/M2 | OXYGEN SATURATION: 98 % | SYSTOLIC BLOOD PRESSURE: 135 MMHG

## 2020-09-21 DIAGNOSIS — G89.29 CHRONIC BILATERAL LOW BACK PAIN WITH LEFT-SIDED SCIATICA: ICD-10-CM

## 2020-09-21 DIAGNOSIS — M54.42 CHRONIC BILATERAL LOW BACK PAIN WITH LEFT-SIDED SCIATICA: ICD-10-CM

## 2020-09-21 DIAGNOSIS — I10 ESSENTIAL HYPERTENSION: ICD-10-CM

## 2020-09-21 DIAGNOSIS — Z79.899 LONG TERM USE OF DRUG: Primary | ICD-10-CM

## 2020-09-21 PROCEDURE — 99213 OFFICE O/P EST LOW 20 MIN: CPT | Performed by: INTERNAL MEDICINE

## 2020-09-21 RX ORDER — GABAPENTIN 800 MG/1
800 TABLET ORAL 3 TIMES DAILY
Qty: 90 TABLET | Refills: 0 | Status: SHIPPED | OUTPATIENT
Start: 2020-09-21 | End: 2020-10-19 | Stop reason: SDUPTHER

## 2020-09-21 RX ORDER — GABAPENTIN 600 MG/1
600 TABLET ORAL 3 TIMES DAILY
Qty: 90 TABLET | Refills: 0 | Status: CANCELLED | OUTPATIENT
Start: 2020-09-21

## 2020-09-21 NOTE — PROGRESS NOTES
Subjective     Chief Complaint   Patient presents with   • Hypertension       History of Present Illness  Patient feels like he needs an increase in his Neurontin. States that in the evening he has increased pain. Using NSAIDs in the am. In the middle of the night he gets up hurting and can't get back to sleep. Feels like they need to last longer.     Trazodone is helping.   States that his mom passed away this past week, out of town. He was unable to attend the services.     Patient states that he gets his license back next month and is going to be joining the gym.   Ate today, will get labs some time this week to FU on cholesterol.     Patient's PMR from outside medical facility reviewed and noted.    Review of Systems   Constitutional: Negative for chills and fever.   HENT: Negative for congestion and rhinorrhea.    Respiratory: Negative for cough and shortness of breath.    Cardiovascular: Negative for chest pain and leg swelling.   Gastrointestinal: Negative for blood in stool, diarrhea, nausea and vomiting.   Genitourinary: Negative for dysuria and hematuria.   Musculoskeletal: Positive for back pain and neck pain.        Leg pain and left knee pain. Spur in the area.    Skin: Negative for color change and wound.   Allergic/Immunologic: Positive for environmental allergies. Negative for immunocompromised state.   Neurological: Negative for seizures and headaches.        HA every now and then   Psychiatric/Behavioral: Negative for dysphoric mood and sleep disturbance. The patient is nervous/anxious.         Patient reports still having anxiety issues.         Otherwise complete ROS reviewed and negative except as mentioned in the HPI.    Past Medical History:   Past Medical History:   Diagnosis Date   • Anxiety    • Depression    • Elevated cholesterol    • Hypertension      Past Surgical History:  Past Surgical History:   Procedure Laterality Date   • WOUND CLOSURE Right 5/28/2019    Procedure: Complex  closure of open scalp wound avulsion defect 7x 4.5 cm with rotation/ advancement flap closure 9cm x 5 cm;  Surgeon: Fam Cardoso MD;  Location: [facility];  Service: ENT     Social History:  reports that he has been smoking cigarettes. He has a 19.00 pack-year smoking history. He has never used smokeless tobacco. He reports previous alcohol use. He reports that he does not use drugs.    Family History: family history includes Diabetes in his mother; Hyperlipidemia in his mother; Hypertension in his father and mother; Stroke in his maternal grandfather.       Allergies:  No Known Allergies  Medications:  Prior to Admission medications    Medication Sig Start Date End Date Taking? Authorizing Provider   acetaminophen (TYLENOL) 325 MG tablet Take 2 tablets by mouth Every 4 (Four) Hours As Needed for Mild Pain . 5/28/19  Yes Fam Cardoso MD   amitriptyline (ELAVIL) 25 MG tablet Take 1 tablet by mouth Every Night. 8/24/20  Yes Nella Flores, DO   amLODIPine (NORVASC) 5 MG tablet Take 5 mg by mouth Daily.   Yes ProviderJanie MD   ARIPiprazole (ABILIFY) 5 MG tablet Take 5 mg by mouth Daily.   Yes ProviderJanie MD   Bacitracin-Polymyxin B ([facility] POLY BACITRACIN) [id number] UNIT/GM ointment APPLY TOPICALLY TO THE APPROPRIATE AREA AS DIRECTED 2 (TWO) TIMES A DAY FOR 7 DAYS. 6/25/19  Yes Fam Cardoso MD   buprenorphine-naloxone (SUBOXONE) 8-2 MG per SL tablet Place 1 tablet under the tongue Daily.   Yes ProviderJanie MD   diazePAM (Valium) 5 MG tablet Take 1 tablet by mouth At Night As Needed for Anxiety or Sleep. 6/18/20  Yes Nella Flores DO   DULoxetine (CYMBALTA) 60 MG capsule Take 60 mg by mouth Daily.   Yes Janie Bueno MD   fluticasone (FLONASE) 50 MCG/ACT nasal spray 2 sprays into the nostril(s) as directed by provider Daily. 6/18/20  Yes Nella Flores DO   gabapentin (Neurontin) 600 MG tablet Take 1 tablet by mouth 3 (Three) Times a Day. 8/18/20   "Yes Nella Flores, DO   hydroCHLOROthiazide (HYDRODIURIL) 25 MG tablet Take 25 mg by mouth Daily.   Yes ProviderJanie MD   ibuprofen (ADVIL,MOTRIN) 800 MG tablet Take 1 tablet by mouth 2 (Two) Times a Day. 6/18/20  Yes Nella Flores DO   lisinopril (PRINIVIL,ZESTRIL) 40 MG tablet Take 1 tablet by mouth Daily. 6/18/20  Yes Nella Flores DO   Loratadine (CLARITIN PO) Take 10 mg by mouth Daily.   Yes Provider, MD Janie   nicotine (NICODERM CQ) 21 MG/24HR patch Place 21 patches on the skin as directed by provider Daily. 1/7/20  Yes Janie Bueno MD   Omega-3 Fatty Acids (FISH OIL) 1000 MG capsule capsule Take 1,000 mg by mouth Daily With Breakfast.   Yes ProviderJanie MD   pantoprazole (Protonix) 40 MG EC tablet Take 1 tablet by mouth Daily. 3/26/20  Yes Leida Amador APRN   sucralfate (Carafate) 1 g tablet Take 1 tablet by mouth 4 (Four) Times a Day. 3/26/20  Yes Leida Amador APRN   traZODone (DESYREL) 100 MG tablet Take 1 tablet by mouth Every Night. 9/17/20  Yes Nella Flores DO   sodium-potassium-magnesium sulfates (Suprep Bowel Prep Kit) 17.5-3.13-1.6 GM/177ML solution oral solution Take 1 bottle by mouth Every 12 (Twelve) Hours. Split dose prep as directed by office instructions provided.  2 bottles = one kit. 3/26/20   Leida Amador APRN       Objective     Vital Signs: /80 (BP Location: Left arm, Patient Position: Sitting, Cuff Size: Adult)   Pulse 75   Temp 97.8 °F (36.6 °C) (Infrared)   Ht 180.3 cm (71\")   Wt 112 kg (248 lb)   SpO2 98%   BMI 34.59 kg/m²   Physical Exam  Vitals signs reviewed.   Constitutional:       Appearance: Normal appearance.   HENT:      Head: Normocephalic and atraumatic.      Nose: Nose normal.   Eyes:      Conjunctiva/sclera: Conjunctivae normal.   Neck:      Musculoskeletal: Normal range of motion and neck supple.   Cardiovascular:      Rate and Rhythm: Normal rate and regular rhythm.      Heart sounds: Normal heart " sounds.   Pulmonary:      Effort: Pulmonary effort is normal.      Breath sounds: Normal breath sounds.   Abdominal:      General: Bowel sounds are normal.      Palpations: Abdomen is soft.   Musculoskeletal:         General: No tenderness.      Comments: Mild bilateral LE edema. Patient states that this is chronic.    Skin:     General: Skin is warm and dry.      Comments: Right well healed scalp scar.    Neurological:      General: No focal deficit present.      Mental Status: He is alert.      Cranial Nerves: No cranial nerve deficit.   Psychiatric:         Mood and Affect: Mood normal.         Behavior: Behavior normal.       Patient's Body mass index is 34.59 kg/m². BMI is above normal parameters. Recommendations include: nutrition counseling.      Results Reviewed:  Glucose   Date Value Ref Range Status   02/18/2020 79 74 - 109 mg/dL Final     BUN   Date Value Ref Range Status   02/18/2020 20 6 - 20 mg/dL Final     Creatinine   Date Value Ref Range Status   02/18/2020 0.7 0.5 - 1.2 mg/dL Final     Sodium   Date Value Ref Range Status   02/18/2020 140 136 - 145 mmol/L Final     Potassium   Date Value Ref Range Status   02/18/2020 4.4 3.5 - 5.0 mmol/L Final     Chloride   Date Value Ref Range Status   02/18/2020 102 98 - 111 mmol/L Final     CO2   Date Value Ref Range Status   02/18/2020 24 22 - 29 mmol/L Final     Calcium   Date Value Ref Range Status   02/18/2020 9.4 8.6 - 10.0 mg/dL Final     ALT (SGPT)   Date Value Ref Range Status   02/18/2020 8 5 - 41 U/L Final     AST (SGOT)   Date Value Ref Range Status   02/18/2020 12 5 - 40 U/L Final     WBC   Date Value Ref Range Status   02/18/2020 7.5 4.8 - 10.8 K/uL Final     Hematocrit   Date Value Ref Range Status   02/18/2020 39.5 (L) 42.0 - 52.0 % Final     Platelets   Date Value Ref Range Status   02/18/2020 281 130 - 400 K/uL Final     Triglycerides   Date Value Ref Range Status   02/18/2020 98 0 - 149 mg/dL Final     HDL Cholesterol   Date Value Ref Range  Status   02/18/2020 44 (L) 55 - 121 mg/dL Final     Comment:     VALUES>60 MG/DL ARE ASSOCIATED WITH A DECREASED RISK OF  ATHEROSCLEROTIC CARDIOVASCULAR DISEASE     LDL Cholesterol    Date Value Ref Range Status   02/18/2020 77 <100 mg/dL Final     Comment:     <100 MG/DL=OPITIMAL    100-129 MG/DL=DESIRABLE    130-159 MG/DL BORDERLINE=INCREASED RISK OF ATHEROSCLEROTIC  CARDIOVASCULAR DISEASE    > OR = 160 MG/DL=ASSOCIATED WITH AN INCREASE RISK OF  ATHEROSCLEROTIC CARDIOVASCULAR DISEASE         Assessment / Plan     Assessment/Plan:  1. Chronic bilateral low back pain with left-sided sciatica  - gabapentin (Neurontin) 800 MG tablet; Take 1 tablet by mouth 3 (Three) Times a Day.  Dispense: 90 tablet; Refill: 0  - Increase the above medication    2. Long term use of drug  - Compliance Drug Analysis, Ur - Urine, Clean Catch    3. Essential hypertension  - CBC w AUTO Differential; Future  - Comprehensive metabolic panel; Future  - Lipid panel; Future  - BP controlled    4. Vitamin D deficiency  - Discussed getting OTC supplement       Return in about 6 months (around 3/21/2021) for Recheck, Next scheduled follow up. unless patient needs to be seen sooner or acute issues arise.    Code Status: Full    I have discussed the patient results/orders and and plan/recommendation with them at today's visit.      Nella Flores, DO   09/21/2020

## 2020-09-25 LAB — DRUGS UR: NORMAL

## 2020-10-13 DIAGNOSIS — J30.2 SEASONAL ALLERGIES: ICD-10-CM

## 2020-10-13 RX ORDER — FLUTICASONE PROPIONATE 50 MCG
2 SPRAY, SUSPENSION (ML) NASAL DAILY
Qty: 1 BOTTLE | Refills: 3 | Status: SHIPPED | OUTPATIENT
Start: 2020-10-13 | End: 2021-02-15 | Stop reason: SDUPTHER

## 2020-10-19 DIAGNOSIS — G89.29 CHRONIC BILATERAL LOW BACK PAIN WITH LEFT-SIDED SCIATICA: ICD-10-CM

## 2020-10-19 DIAGNOSIS — M54.42 CHRONIC BILATERAL LOW BACK PAIN WITH LEFT-SIDED SCIATICA: ICD-10-CM

## 2020-10-19 DIAGNOSIS — G47.09 OTHER INSOMNIA: ICD-10-CM

## 2020-10-19 RX ORDER — TRAZODONE HYDROCHLORIDE 100 MG/1
100 TABLET ORAL NIGHTLY
Qty: 30 TABLET | Refills: 0 | Status: SHIPPED | OUTPATIENT
Start: 2020-10-19 | End: 2020-12-22 | Stop reason: SDUPTHER

## 2020-10-19 RX ORDER — GABAPENTIN 800 MG/1
800 TABLET ORAL 3 TIMES DAILY
Qty: 90 TABLET | Refills: 0 | Status: SHIPPED | OUTPATIENT
Start: 2020-10-19 | End: 2020-10-22 | Stop reason: SDUPTHER

## 2020-10-22 DIAGNOSIS — M54.42 CHRONIC BILATERAL LOW BACK PAIN WITH LEFT-SIDED SCIATICA: ICD-10-CM

## 2020-10-22 DIAGNOSIS — G89.29 CHRONIC BILATERAL LOW BACK PAIN WITH LEFT-SIDED SCIATICA: ICD-10-CM

## 2020-10-22 RX ORDER — GABAPENTIN 800 MG/1
800 TABLET ORAL 3 TIMES DAILY
Qty: 90 TABLET | Refills: 0 | Status: SHIPPED | OUTPATIENT
Start: 2020-10-22 | End: 2020-11-16 | Stop reason: SDUPTHER

## 2020-10-29 ENCOUNTER — OFFICE VISIT (OUTPATIENT)
Dept: INTERNAL MEDICINE | Facility: CLINIC | Age: 55
End: 2020-10-29

## 2020-10-29 VITALS
BODY MASS INDEX: 36.96 KG/M2 | HEIGHT: 71 IN | TEMPERATURE: 96.5 F | WEIGHT: 264 LBS | HEART RATE: 89 BPM | DIASTOLIC BLOOD PRESSURE: 74 MMHG | SYSTOLIC BLOOD PRESSURE: 137 MMHG | OXYGEN SATURATION: 96 %

## 2020-10-29 DIAGNOSIS — Z79.899 LONG TERM USE OF DRUG: Primary | ICD-10-CM

## 2020-10-29 DIAGNOSIS — F41.9 ANXIETY: ICD-10-CM

## 2020-10-29 PROCEDURE — 99213 OFFICE O/P EST LOW 20 MIN: CPT | Performed by: INTERNAL MEDICINE

## 2020-10-29 RX ORDER — HYDROXYZINE PAMOATE 100 MG/1
100 CAPSULE ORAL 3 TIMES DAILY PRN
Qty: 30 CAPSULE | Refills: 1 | Status: SHIPPED | OUTPATIENT
Start: 2020-10-29 | End: 2021-04-08

## 2020-11-02 LAB — DRUGS UR: NORMAL

## 2020-11-03 DIAGNOSIS — G47.09 OTHER INSOMNIA: ICD-10-CM

## 2020-11-03 RX ORDER — AMITRIPTYLINE HYDROCHLORIDE 25 MG/1
25 TABLET, FILM COATED ORAL NIGHTLY
Qty: 30 TABLET | Refills: 1 | Status: SHIPPED | OUTPATIENT
Start: 2020-11-03 | End: 2021-01-05 | Stop reason: SDUPTHER

## 2020-11-16 DIAGNOSIS — M54.42 CHRONIC BILATERAL LOW BACK PAIN WITH LEFT-SIDED SCIATICA: ICD-10-CM

## 2020-11-16 DIAGNOSIS — G89.29 CHRONIC BILATERAL LOW BACK PAIN WITH LEFT-SIDED SCIATICA: ICD-10-CM

## 2020-11-16 RX ORDER — GABAPENTIN 800 MG/1
800 TABLET ORAL 3 TIMES DAILY
Qty: 90 TABLET | Refills: 0 | Status: SHIPPED | OUTPATIENT
Start: 2020-11-16 | End: 2020-12-10 | Stop reason: SDUPTHER

## 2020-12-08 DIAGNOSIS — G89.29 CHRONIC BILATERAL LOW BACK PAIN WITH LEFT-SIDED SCIATICA: ICD-10-CM

## 2020-12-08 DIAGNOSIS — M54.42 CHRONIC BILATERAL LOW BACK PAIN WITH LEFT-SIDED SCIATICA: ICD-10-CM

## 2020-12-09 NOTE — TELEPHONE ENCOUNTER
Pt called to request refill of Gabapentin,  But he is not due until 12/15/20,   I have run antonio will scan it it.

## 2020-12-10 RX ORDER — GABAPENTIN 800 MG/1
800 TABLET ORAL 3 TIMES DAILY
Qty: 90 TABLET | Refills: 0 | Status: SHIPPED | OUTPATIENT
Start: 2020-12-10 | End: 2021-01-07 | Stop reason: SDUPTHER

## 2020-12-22 DIAGNOSIS — G47.09 OTHER INSOMNIA: ICD-10-CM

## 2020-12-22 RX ORDER — TRAZODONE HYDROCHLORIDE 100 MG/1
100 TABLET ORAL NIGHTLY
Qty: 30 TABLET | Refills: 0 | Status: SHIPPED | OUTPATIENT
Start: 2020-12-22 | End: 2021-02-02 | Stop reason: SDUPTHER

## 2020-12-29 DIAGNOSIS — M19.90 ARTHRITIS: ICD-10-CM

## 2020-12-29 RX ORDER — DULOXETIN HYDROCHLORIDE 60 MG/1
60 CAPSULE, DELAYED RELEASE ORAL DAILY
Qty: 30 CAPSULE | Refills: 0 | Status: SHIPPED | OUTPATIENT
Start: 2020-12-29 | End: 2021-02-01

## 2020-12-29 NOTE — TELEPHONE ENCOUNTER
Received fax from pharmacy requesting refill on pts medication(s). Pt was last seen in office on 2/17/2020  and has a follow up scheduled for Visit date not found. Will send request to  Darnell Encarnacion  for patient.      Requested Prescriptions     Pending Prescriptions Disp Refills    DULoxetine (CYMBALTA) 60 MG extended release capsule [Pharmacy Med Name: DULOXETINE HCL DR 60 MG CAP] 30 capsule 11     Sig: TAKE 1 CAPSULE BY MOUTH EVERY DAY

## 2020-12-30 RX ORDER — IBUPROFEN 800 MG/1
800 TABLET ORAL 2 TIMES DAILY
Qty: 60 TABLET | Refills: 5 | Status: SHIPPED | OUTPATIENT
Start: 2020-12-30 | End: 2021-06-21 | Stop reason: SDUPTHER

## 2021-01-05 DIAGNOSIS — G47.09 OTHER INSOMNIA: ICD-10-CM

## 2021-01-05 RX ORDER — AMITRIPTYLINE HYDROCHLORIDE 25 MG/1
25 TABLET, FILM COATED ORAL NIGHTLY
Qty: 30 TABLET | Refills: 1 | Status: SHIPPED | OUTPATIENT
Start: 2021-01-05 | End: 2021-04-08

## 2021-01-07 ENCOUNTER — CLINICAL SUPPORT (OUTPATIENT)
Dept: INTERNAL MEDICINE | Facility: CLINIC | Age: 56
End: 2021-01-07

## 2021-01-07 DIAGNOSIS — G89.29 CHRONIC BILATERAL LOW BACK PAIN WITH LEFT-SIDED SCIATICA: ICD-10-CM

## 2021-01-07 DIAGNOSIS — M54.42 CHRONIC BILATERAL LOW BACK PAIN WITH LEFT-SIDED SCIATICA: ICD-10-CM

## 2021-01-07 DIAGNOSIS — F41.0 ANXIETY ATTACK: Primary | ICD-10-CM

## 2021-01-07 PROCEDURE — 99213 OFFICE O/P EST LOW 20 MIN: CPT | Performed by: INTERNAL MEDICINE

## 2021-01-07 RX ORDER — GABAPENTIN 800 MG/1
800 TABLET ORAL 4 TIMES DAILY
Qty: 120 TABLET | Refills: 0 | Status: SHIPPED | OUTPATIENT
Start: 2021-01-07 | End: 2021-02-13 | Stop reason: SDUPTHER

## 2021-01-07 NOTE — PROGRESS NOTES
"        Subjective     Chief Complaint   Patient presents with   • Labs Only     glucose check       Back Pain  This is a chronic problem. The current episode started more than 1 year ago. The problem occurs constantly. The problem is unchanged. The pain is present in the lumbar spine. The quality of the pain is described as aching and burning. The pain is moderate. The symptoms are aggravated by stress and twisting. Stiffness is present all day. Pertinent negatives include no chest pain, dysuria or fever. Risk factors include sedentary lifestyle and obesity. He has tried analgesics and bed rest for the symptoms. The treatment provided moderate relief.     5-year-old male who presents to the office after having a \"spell\" in his car.  He states he was worried if his blood pressure was too high or his sugar was too low.  He presented to the office and had his blood pressure and sugar both checked, and were in appropriate ranges.  The patient was allowed to come in her room and calm down.  He states he felt like it was a panic attack.  He did feel at leaving that he was able to drive and back to normal.    Patient's PMR from outside medical facility reviewed and noted.    Review of Systems   Constitutional: Negative for chills and fever.   HENT: Negative for congestion and rhinorrhea.    Respiratory: Positive for shortness of breath.    Cardiovascular: Negative for chest pain and leg swelling.   Gastrointestinal: Negative for diarrhea, nausea and vomiting.   Genitourinary: Negative for dysuria and hematuria.   Musculoskeletal: Positive for back pain.   Psychiatric/Behavioral: Positive for agitation. The patient is nervous/anxious.         Otherwise complete ROS reviewed and negative except as mentioned in the HPI.    Past Medical History:   Past Medical History:   Diagnosis Date   • Anxiety    • Depression    • Elevated cholesterol    • Hypertension      Past Surgical History:  Past Surgical History:   Procedure " Laterality Date   • WOUND CLOSURE Right 5/28/2019    Procedure: Complex closure of open scalp wound avulsion defect 7x 4.5 cm with rotation/ advancement flap closure 9cm x 5 cm;  Surgeon: Fam Cardoso MD;  Location: [facility] OR;  Service: ENT     Social History:  reports that he has been smoking cigarettes. He has a 19.00 pack-year smoking history. He has never used smokeless tobacco. He reports previous alcohol use. He reports that he does not use drugs.    Family History: family history includes Diabetes in his mother; Hyperlipidemia in his mother; Hypertension in his father and mother; Stroke in his maternal grandfather.      Allergies:  No Known Allergies  Medications:  Prior to Admission medications    Medication Sig Start Date End Date Taking? Authorizing Provider   acetaminophen (TYLENOL) 325 MG tablet Take 2 tablets by mouth Every 4 (Four) Hours As Needed for Mild Pain . 5/28/19   Fam Cardoso MD   amitriptyline (ELAVIL) 25 MG tablet Take 1 tablet by mouth Every Night. 1/5/21   Nella Flores DO   amLODIPine (NORVASC) 5 MG tablet Take 5 mg by mouth Daily.    ProviderJanie MD   ARIPiprazole (ABILIFY) 5 MG tablet Take 5 mg by mouth Daily.    ProviderJanie MD   Bacitracin-Polymyxin B ([facility] POLY BACITRACIN) [id number] UNIT/GM ointment APPLY TOPICALLY TO THE APPROPRIATE AREA AS DIRECTED 2 (TWO) TIMES A DAY FOR 7 DAYS. 6/25/19   Fam Cardoso MD   buprenorphine-naloxone (SUBOXONE) 8-2 MG per SL tablet Place 1 tablet under the tongue Daily.    ProviderJanie MD   diazePAM (Valium) 5 MG tablet Take 1 tablet by mouth At Night As Needed for Anxiety or Sleep. 6/18/20   Nella Flores DO   DULoxetine (CYMBALTA) 60 MG capsule Take 60 mg by mouth Daily.    Janie Bueno MD   fluticasone (FLONASE) 50 MCG/ACT nasal spray 2 sprays into the nostril(s) as directed by provider Daily. 10/13/20   Nella Flores DO   gabapentin (Neurontin) 800 MG tablet Take 1  tablet by mouth 3 (Three) Times a Day. 12/10/20   Nella Flores DO   hydroCHLOROthiazide (HYDRODIURIL) 25 MG tablet Take 25 mg by mouth Daily.    ProviderJanie MD   hydrOXYzine pamoate (VISTARIL) 100 MG capsule Take 1 capsule by mouth 3 (Three) Times a Day As Needed for Anxiety. 10/29/20   Nella Flores DO   ibuprofen (ADVIL,MOTRIN) 800 MG tablet Take 1 tablet by mouth 2 (Two) Times a Day. 12/30/20   Nella Flores DO   lisinopril (PRINIVIL,ZESTRIL) 40 MG tablet Take 1 tablet by mouth Daily. 6/18/20   Nella Flores DO   Loratadine (CLARITIN PO) Take 10 mg by mouth Daily.    ProviderJanie MD   nicotine (NICODERM CQ) 21 MG/24HR patch Place 21 patches on the skin as directed by provider Daily. 1/7/20   Janie Bueno MD   Omega-3 Fatty Acids (FISH OIL) 1000 MG capsule capsule Take 1,000 mg by mouth Daily With Breakfast.    ProviderJanie MD   pantoprazole (Protonix) 40 MG EC tablet Take 1 tablet by mouth Daily. 3/26/20   Leida Amador APRN   sodium-potassium-magnesium sulfates (Suprep Bowel Prep Kit) 17.5-3.13-1.6 GM/177ML solution oral solution Take 1 bottle by mouth Every 12 (Twelve) Hours. Split dose prep as directed by office instructions provided.  2 bottles = one kit. 3/26/20   Leida Amador APRN   sucralfate (Carafate) 1 g tablet Take 1 tablet by mouth 4 (Four) Times a Day. 3/26/20   Leida Amador APRN   traZODone (DESYREL) 100 MG tablet Take 1 tablet by mouth Every Night. 12/22/20   Nella Flores DO       Objective     Vital Signs: There were no vitals taken for this visit.  Physical Exam  Vitals signs reviewed.   Constitutional:       Comments: Patient is anxious and pacing in the room   HENT:      Head: Normocephalic and atraumatic.      Nose: Nose normal.   Eyes:      Extraocular Movements: Extraocular movements intact.      Conjunctiva/sclera: Conjunctivae normal.   Neck:      Musculoskeletal: Normal range of motion and neck supple.      Cardiovascular:      Rate and Rhythm: Normal rate and regular rhythm.      Heart sounds: Normal heart sounds.   Pulmonary:      Effort: Pulmonary effort is normal. No respiratory distress.      Breath sounds: Normal breath sounds.   Musculoskeletal:         General: No swelling or tenderness.   Skin:     General: Skin is warm and dry.   Neurological:      General: No focal deficit present.      Mental Status: He is alert.      Cranial Nerves: No cranial nerve deficit.   Psychiatric:         Judgment: Judgment normal.      Comments: Anxious       Patient's There is no height or weight on file to calculate BMI. BMI is within normal parameters. No follow-up required..      Results Reviewed:  Glucose   Date Value Ref Range Status   02/18/2020 79 74 - 109 mg/dL Final     BUN   Date Value Ref Range Status   02/18/2020 20 6 - 20 mg/dL Final     Creatinine   Date Value Ref Range Status   02/18/2020 0.7 0.5 - 1.2 mg/dL Final     Sodium   Date Value Ref Range Status   02/18/2020 140 136 - 145 mmol/L Final     Potassium   Date Value Ref Range Status   02/18/2020 4.4 3.5 - 5.0 mmol/L Final     Chloride   Date Value Ref Range Status   02/18/2020 102 98 - 111 mmol/L Final     CO2   Date Value Ref Range Status   02/18/2020 24 22 - 29 mmol/L Final     Calcium   Date Value Ref Range Status   02/18/2020 9.4 8.6 - 10.0 mg/dL Final     ALT (SGPT)   Date Value Ref Range Status   02/18/2020 8 5 - 41 U/L Final     AST (SGOT)   Date Value Ref Range Status   02/18/2020 12 5 - 40 U/L Final     WBC   Date Value Ref Range Status   02/18/2020 7.5 4.8 - 10.8 K/uL Final     Hematocrit   Date Value Ref Range Status   02/18/2020 39.5 (L) 42.0 - 52.0 % Final     Platelets   Date Value Ref Range Status   02/18/2020 281 130 - 400 K/uL Final     Triglycerides   Date Value Ref Range Status   02/18/2020 98 0 - 149 mg/dL Final     HDL Cholesterol   Date Value Ref Range Status   02/18/2020 44 (L) 55 - 121 mg/dL Final     Comment:     VALUES>60 MG/DL ARE  ASSOCIATED WITH A DECREASED RISK OF  ATHEROSCLEROTIC CARDIOVASCULAR DISEASE     LDL Cholesterol    Date Value Ref Range Status   02/18/2020 77 <100 mg/dL Final     Comment:     <100 MG/DL=OPITIMAL    100-129 MG/DL=DESIRABLE    130-159 MG/DL BORDERLINE=INCREASED RISK OF ATHEROSCLEROTIC  CARDIOVASCULAR DISEASE    > OR = 160 MG/DL=ASSOCIATED WITH AN INCREASE RISK OF  ATHEROSCLEROTIC CARDIOVASCULAR DISEASE         Assessment / Plan     Assessment/Plan:  1. Chronic bilateral low back pain with left-sided sciatica  - gabapentin (Neurontin) 800 MG tablet; Take 1 tablet by mouth 4 (Four) Times a Day.  Dispense: 120 tablet; Refill: 0    2. Anxiety attack  -Patient states the only thing that controls his anxiety is Valium, and he is unable to take it secondary to his pain management physician.        Return in about 3 months (around 4/7/2021) for Recheck, Next scheduled follow up. unless patient needs to be seen sooner or acute issues arise.    Code Status:     I have discussed the patient results/orders and and plan/recommendation with them at today's visit.      Nella Flores, DO   01/07/2021

## 2021-01-30 DIAGNOSIS — F41.1 GAD (GENERALIZED ANXIETY DISORDER): ICD-10-CM

## 2021-01-30 DIAGNOSIS — F33.2 SEVERE EPISODE OF RECURRENT MAJOR DEPRESSIVE DISORDER, WITHOUT PSYCHOTIC FEATURES (HCC): ICD-10-CM

## 2021-02-01 RX ORDER — DULOXETIN HYDROCHLORIDE 60 MG/1
60 CAPSULE, DELAYED RELEASE ORAL DAILY
Qty: 30 CAPSULE | Refills: 0 | Status: SHIPPED | OUTPATIENT
Start: 2021-02-01

## 2021-02-01 NOTE — TELEPHONE ENCOUNTER
Received fax from pharmacy requesting refill on pts medication(s). Pt was last seen in office on 2/17/2020  and has a follow up scheduled for Visit date not found. Will send request to  Geeta Landry  for patient.      Requested Prescriptions     Pending Prescriptions Disp Refills    DULoxetine (CYMBALTA) 60 MG extended release capsule [Pharmacy Med Name: DULOXETINE HCL DR 60 MG CAP] 30 capsule 0     Sig: TAKE 1 CAPSULE BY MOUTH DAILY (MUST BE SEEN BEFORE FURTHER REFILLS)

## 2021-02-02 DIAGNOSIS — G47.09 OTHER INSOMNIA: ICD-10-CM

## 2021-02-04 RX ORDER — TRAZODONE HYDROCHLORIDE 100 MG/1
100 TABLET ORAL NIGHTLY
Qty: 30 TABLET | Refills: 0 | Status: SHIPPED | OUTPATIENT
Start: 2021-02-04 | End: 2021-03-09 | Stop reason: SDUPTHER

## 2021-02-08 ENCOUNTER — OFFICE VISIT (OUTPATIENT)
Dept: INTERNAL MEDICINE | Facility: CLINIC | Age: 56
End: 2021-02-08

## 2021-02-08 VITALS
TEMPERATURE: 95.4 F | DIASTOLIC BLOOD PRESSURE: 85 MMHG | HEART RATE: 68 BPM | BODY MASS INDEX: 38.5 KG/M2 | OXYGEN SATURATION: 96 % | WEIGHT: 275 LBS | HEIGHT: 71 IN | SYSTOLIC BLOOD PRESSURE: 126 MMHG

## 2021-02-08 DIAGNOSIS — Z79.899 LONG TERM USE OF DRUG: Primary | ICD-10-CM

## 2021-02-08 DIAGNOSIS — R53.83 FATIGUE, UNSPECIFIED TYPE: ICD-10-CM

## 2021-02-08 DIAGNOSIS — E55.9 VITAMIN D DEFICIENCY: ICD-10-CM

## 2021-02-08 DIAGNOSIS — R21 RASH: ICD-10-CM

## 2021-02-08 DIAGNOSIS — Z12.5 PROSTATE CANCER SCREENING: ICD-10-CM

## 2021-02-08 DIAGNOSIS — R35.0 URINARY FREQUENCY: ICD-10-CM

## 2021-02-08 DIAGNOSIS — R60.0 FACIAL EDEMA: ICD-10-CM

## 2021-02-08 DIAGNOSIS — R60.0 BILATERAL LOWER EXTREMITY EDEMA: ICD-10-CM

## 2021-02-08 PROCEDURE — 99213 OFFICE O/P EST LOW 20 MIN: CPT | Performed by: INTERNAL MEDICINE

## 2021-02-08 RX ORDER — TAMSULOSIN HYDROCHLORIDE 0.4 MG/1
1 CAPSULE ORAL DAILY
Qty: 30 CAPSULE | Refills: 5 | Status: SHIPPED | OUTPATIENT
Start: 2021-02-08 | End: 2021-07-29 | Stop reason: SDUPTHER

## 2021-02-08 RX ORDER — CLOTRIMAZOLE AND BETAMETHASONE DIPROPIONATE 10; .64 MG/G; MG/G
CREAM TOPICAL 2 TIMES DAILY
Qty: 45 G | Refills: 5 | Status: SHIPPED | OUTPATIENT
Start: 2021-02-08

## 2021-02-08 RX ORDER — FUROSEMIDE 20 MG/1
20 TABLET ORAL DAILY
Qty: 4 TABLET | Refills: 0 | Status: SHIPPED | OUTPATIENT
Start: 2021-02-08 | End: 2021-04-08

## 2021-02-08 NOTE — PROGRESS NOTES
Subjective     Chief Complaint   Patient presents with   • Long term use of drugs     3 mo fu       Hypertension  This is a chronic problem. The current episode started more than 1 year ago. The problem is unchanged. The problem is controlled. Associated symptoms include anxiety and shortness of breath. Risk factors for coronary artery disease include male gender, obesity, sedentary lifestyle and dyslipidemia. Current antihypertension treatment includes ACE inhibitors and calcium channel blockers. The current treatment provides moderate improvement. There are no compliance problems.      Nocturia. States that he is urinating every hour at bedtime.   Facial edema. Worse over the last week. Lower extremity edema.     Patient's PMR from outside medical facility reviewed and noted.    Review of Systems   Constitutional: Negative for chills and fever.   HENT: Negative for congestion and rhinorrhea.         Facial edema   Respiratory: Positive for shortness of breath. Negative for cough.         SOA worse at the end of the day   Cardiovascular: Positive for leg swelling.   Gastrointestinal: Negative for diarrhea, nausea and vomiting.   Genitourinary: Negative for dysuria and hematuria.        Nocturia   Skin: Positive for color change.   Psychiatric/Behavioral: Positive for decreased concentration. The patient is nervous/anxious.       Otherwise complete ROS reviewed and negative except as mentioned in the HPI.    Past Medical History:   Past Medical History:   Diagnosis Date   • Anxiety    • Depression    • Elevated cholesterol    • Hypertension      Past Surgical History:  Past Surgical History:   Procedure Laterality Date   • WOUND CLOSURE Right 5/28/2019    Procedure: Complex closure of open scalp wound avulsion defect 7x 4.5 cm with rotation/ advancement flap closure 9cm x 5 cm;  Surgeon: Fam Cardoso MD;  Location: [facility];  Service: ENT     Social History:  reports that he has been smoking  cigarettes. He has a 19.00 pack-year smoking history. He has never used smokeless tobacco. He reports previous alcohol use. He reports that he does not use drugs.    Family History: family history includes Diabetes in his mother; Hyperlipidemia in his mother; Hypertension in his father and mother; Stroke in his maternal grandfather.       Allergies:  No Known Allergies  Medications:  Prior to Admission medications    Medication Sig Start Date End Date Taking? Authorizing Provider   acetaminophen (TYLENOL) 325 MG tablet Take 2 tablets by mouth Every 4 (Four) Hours As Needed for Mild Pain . 5/28/19  Yes Fam Cardoso MD   amitriptyline (ELAVIL) 25 MG tablet Take 1 tablet by mouth Every Night. 1/5/21  Yes Nella Flores DO   amLODIPine (NORVASC) 5 MG tablet Take 5 mg by mouth Daily.   Yes Janie Bueno MD   ARIPiprazole (ABILIFY) 5 MG tablet Take 5 mg by mouth Daily.   Yes Janie Bueno MD   Bacitracin-Polymyxin B ([facility] POLY BACITRACIN) [id number] UNIT/GM ointment APPLY TOPICALLY TO THE APPROPRIATE AREA AS DIRECTED 2 (TWO) TIMES A DAY FOR 7 DAYS. 6/25/19  Yes Fam Cardoso MD   buprenorphine-naloxone (SUBOXONE) 8-2 MG per SL tablet Place 1 tablet under the tongue Daily.   Yes Janie Bueno MD   diazePAM (Valium) 5 MG tablet Take 1 tablet by mouth At Night As Needed for Anxiety or Sleep. 6/18/20  Yes Nella Flores DO   DULoxetine (CYMBALTA) 60 MG capsule Take 60 mg by mouth Daily.   Yes Janie Bueno MD   fluticasone (FLONASE) 50 MCG/ACT nasal spray 2 sprays into the nostril(s) as directed by provider Daily. 10/13/20  Yes Nella Flores DO   gabapentin (Neurontin) 800 MG tablet Take 1 tablet by mouth 4 (Four) Times a Day. 1/7/21  Yes Nella Flores DO   hydroCHLOROthiazide (HYDRODIURIL) 25 MG tablet Take 25 mg by mouth Daily.   Yes Janie Bueno MD   hydrOXYzine pamoate (VISTARIL) 100 MG capsule Take 1 capsule by mouth 3 (Three) Times a Day As  "Needed for Anxiety. 10/29/20  Yes Nella Flores DO   ibuprofen (ADVIL,MOTRIN) 800 MG tablet Take 1 tablet by mouth 2 (Two) Times a Day. 12/30/20  Yes Nella Flores DO   lisinopril (PRINIVIL,ZESTRIL) 40 MG tablet Take 1 tablet by mouth Daily. 6/18/20  Yes Nella Flores DO   Loratadine (CLARITIN PO) Take 10 mg by mouth Daily.   Yes ProviderJanie MD   nicotine (NICODERM CQ) 21 MG/24HR patch Place 21 patches on the skin as directed by provider Daily. 1/7/20  Yes Janie Bueno MD   Omega-3 Fatty Acids (FISH OIL) 1000 MG capsule capsule Take 1,000 mg by mouth Daily With Breakfast.   Yes ProviderJanie MD   pantoprazole (Protonix) 40 MG EC tablet Take 1 tablet by mouth Daily. 3/26/20  Yes Leida Amador APRN   sodium-potassium-magnesium sulfates (Suprep Bowel Prep Kit) 17.5-3.13-1.6 GM/177ML solution oral solution Take 1 bottle by mouth Every 12 (Twelve) Hours. Split dose prep as directed by office instructions provided.  2 bottles = one kit. 3/26/20  Yes Leida Amador APRN   sucralfate (Carafate) 1 g tablet Take 1 tablet by mouth 4 (Four) Times a Day. 3/26/20  Yes Leida Amador APRN   traZODone (DESYREL) 100 MG tablet Take 1 tablet by mouth Every Night. 2/4/21  Yes Nella Flores DO       Objective     Vital Signs: /85 (BP Location: Left arm, Patient Position: Sitting, Cuff Size: Adult)   Pulse 68   Temp 95.4 °F (35.2 °C) (Infrared)   Ht 180.3 cm (71\")   Wt 125 kg (275 lb)   SpO2 96%   BMI 38.35 kg/m²   Physical Exam  Vitals signs reviewed.   Constitutional:       Appearance: Normal appearance.   HENT:      Head: Normocephalic and atraumatic.      Comments: Facial edema. Worse in the upper eyelids.     Nose: Nose normal.   Eyes:      Conjunctiva/sclera: Conjunctivae normal.   Neck:      Musculoskeletal: Normal range of motion and neck supple.   Cardiovascular:      Rate and Rhythm: Normal rate and regular rhythm.      Heart sounds: Normal heart sounds.   "   Pulmonary:      Effort: Pulmonary effort is normal.      Breath sounds: Normal breath sounds.   Abdominal:      General: Bowel sounds are normal.      Palpations: Abdomen is soft.   Musculoskeletal:         General: No tenderness.      Right lower leg: Edema present.      Left lower leg: Edema present.   Skin:     General: Skin is warm and dry.      Comments: Right temporal scar.    Neurological:      General: No focal deficit present.      Mental Status: He is alert.      Cranial Nerves: No cranial nerve deficit.   Psychiatric:         Behavior: Behavior normal.       Patient's Body mass index is 38.35 kg/m². BMI is above normal parameters. Recommendations include: nutrition counseling.      Results Reviewed:  Glucose   Date Value Ref Range Status   02/18/2020 79 74 - 109 mg/dL Final     BUN   Date Value Ref Range Status   02/18/2020 20 6 - 20 mg/dL Final     Creatinine   Date Value Ref Range Status   02/18/2020 0.7 0.5 - 1.2 mg/dL Final     Sodium   Date Value Ref Range Status   02/18/2020 140 136 - 145 mmol/L Final     Potassium   Date Value Ref Range Status   02/18/2020 4.4 3.5 - 5.0 mmol/L Final     Chloride   Date Value Ref Range Status   02/18/2020 102 98 - 111 mmol/L Final     CO2   Date Value Ref Range Status   02/18/2020 24 22 - 29 mmol/L Final     Calcium   Date Value Ref Range Status   02/18/2020 9.4 8.6 - 10.0 mg/dL Final     ALT (SGPT)   Date Value Ref Range Status   02/18/2020 8 5 - 41 U/L Final     AST (SGOT)   Date Value Ref Range Status   02/18/2020 12 5 - 40 U/L Final     WBC   Date Value Ref Range Status   02/18/2020 7.5 4.8 - 10.8 K/uL Final     Hematocrit   Date Value Ref Range Status   02/18/2020 39.5 (L) 42.0 - 52.0 % Final     Platelets   Date Value Ref Range Status   02/18/2020 281 130 - 400 K/uL Final     Triglycerides   Date Value Ref Range Status   02/18/2020 98 0 - 149 mg/dL Final     HDL Cholesterol   Date Value Ref Range Status   02/18/2020 44 (L) 55 - 121 mg/dL Final     Comment:      VALUES>60 MG/DL ARE ASSOCIATED WITH A DECREASED RISK OF  ATHEROSCLEROTIC CARDIOVASCULAR DISEASE     LDL Cholesterol    Date Value Ref Range Status   02/18/2020 77 <100 mg/dL Final     Comment:     <100 MG/DL=OPITIMAL    100-129 MG/DL=DESIRABLE    130-159 MG/DL BORDERLINE=INCREASED RISK OF ATHEROSCLEROTIC  CARDIOVASCULAR DISEASE    > OR = 160 MG/DL=ASSOCIATED WITH AN INCREASE RISK OF  ATHEROSCLEROTIC CARDIOVASCULAR DISEASE         Assessment / Plan     Assessment/Plan:  1. Long term use of drug  - Compliance Drug Analysis, Ur - Urine, Clean Catch    2. Rash  - clotrimazole-betamethasone (Lotrisone) 1-0.05 % cream; Apply  topically to the appropriate area as directed 2 (Two) Times a Day.  Dispense: 45 g; Refill: 5    3. Fatigue, unspecified type  - T4; Future  - TSH; Future    4. Vitamin D deficiency  - Will check labs 2-20-21  - States that he has been taking his oral Vitamin D    5. Facial edema  - BNP ([facility] Only); Future    6. Prostate cancer screening  - PSA SCREENING; Future    7. Bilateral lower extremity edema  - Adult Transthoracic Echo Complete W/ Cont if Necessary Per Protocol; Future  - furosemide (Lasix) 20 MG tablet; Take 1 tablet by mouth Daily.  Dispense: 4 tablet; Refill: 0    8. Urinary frequency  - tamsulosin (Flomax) 0.4 MG capsule 24 hr capsule; Take 1 capsule by mouth Daily.  Dispense: 30 capsule; Refill: 5        Return in about 3 months (around 5/8/2021) for Recheck, Next scheduled follow up. unless patient needs to be seen sooner or acute issues arise.    Code Status: Full    I have discussed the patient results/orders and and plan/recommendation with them at today's visit.      Nella Flores, DO   02/08/2021

## 2021-02-11 LAB — DRUGS UR: NORMAL

## 2021-02-12 ENCOUNTER — TELEPHONE (OUTPATIENT)
Dept: INTERNAL MEDICINE | Facility: CLINIC | Age: 56
End: 2021-02-12

## 2021-02-12 NOTE — TELEPHONE ENCOUNTER
PATIENT CALLS         REQUESTING A CALL BACK TO BE UPDATED ABOUT HIS PRESCRIPTION REFILL REQUESTS     HE STATES IT IS ALMOST 2 PM AND HE HAS NOT HEARD BACK AND IS WORRIED ABOUT NOT BEING ABLE TO GET THEM BEFORE THE WEEKEND       GOOD CONTACT NUMBER   [phone] (H)

## 2021-02-12 NOTE — TELEPHONE ENCOUNTER
[facility] called and stated that pt came in with a prescription refill for gabapentin (Neurontin) 800 MG tablet [[id number]] (Order [id number]) that was written on 12/10/2020. I stated that Dr. Flores has not sent any refills over for that med since 1/7/21. She stated that she will need a new refill request sent over.

## 2021-02-12 NOTE — TELEPHONE ENCOUNTER
Returned call to patient to let him know Dr. Flores with be in the office tomorrow and can refill his medications then. He voiced understanding and had no further questions.

## 2021-02-13 DIAGNOSIS — G89.29 CHRONIC BILATERAL LOW BACK PAIN WITH LEFT-SIDED SCIATICA: ICD-10-CM

## 2021-02-13 DIAGNOSIS — M54.42 CHRONIC BILATERAL LOW BACK PAIN WITH LEFT-SIDED SCIATICA: ICD-10-CM

## 2021-02-13 RX ORDER — GABAPENTIN 800 MG/1
800 TABLET ORAL 4 TIMES DAILY
Qty: 120 TABLET | Refills: 0 | Status: SHIPPED | OUTPATIENT
Start: 2021-02-13 | End: 2021-03-15 | Stop reason: SDUPTHER

## 2021-02-13 NOTE — TELEPHONE ENCOUNTER
Pt called checking the status of his refill on gabapentin (Neurontin) 800 MG tablet [[id number]] (Order [id number]).    I informed pt that Dr. Flores has been out of the office.

## 2021-02-15 DIAGNOSIS — J30.2 SEASONAL ALLERGIES: ICD-10-CM

## 2021-02-15 RX ORDER — FLUTICASONE PROPIONATE 50 MCG
2 SPRAY, SUSPENSION (ML) NASAL DAILY
Qty: 15.8 ML | Refills: 3 | Status: SHIPPED | OUTPATIENT
Start: 2021-02-15

## 2021-02-18 ENCOUNTER — TELEPHONE (OUTPATIENT)
Dept: INTERNAL MEDICINE | Facility: CLINIC | Age: 56
End: 2021-02-18

## 2021-02-18 DIAGNOSIS — R09.81 NASAL CONGESTION: Primary | ICD-10-CM

## 2021-02-18 RX ORDER — AZITHROMYCIN 250 MG/1
TABLET, FILM COATED ORAL
Qty: 6 TABLET | Refills: 0 | Status: SHIPPED | OUTPATIENT
Start: 2021-02-18 | End: 2021-04-08

## 2021-02-18 RX ORDER — PSEUDOEPHEDRINE HCL 30 MG
30 TABLET ORAL EVERY 6 HOURS PRN
Qty: 20 TABLET | Refills: 0 | Status: SHIPPED | OUTPATIENT
Start: 2021-02-18 | End: 2021-09-13

## 2021-02-18 NOTE — TELEPHONE ENCOUNTER
Caller: Raimundo Ramos    Relationship: Self    Best call back number: [phone]    What medication are you requesting:  PATIENT STATES THAT HE HAS HAD ALLERGY DRAINAGE FOR PAST 3 DAYS AND WOULD LIKE SOMETHING CALLED IN TO [facility]     If a prescription is needed, what is your preferred pharmacy and phone number: [facility]/PHARMACY #[id number] - [place], [place] - [address] [phone] [facility] [phone]

## 2021-02-24 DIAGNOSIS — R12 HEARTBURN: Primary | ICD-10-CM

## 2021-02-24 RX ORDER — PANTOPRAZOLE SODIUM 40 MG/1
TABLET, DELAYED RELEASE ORAL
Qty: 30 TABLET | Refills: 0 | Status: SHIPPED | OUTPATIENT
Start: 2021-02-24 | End: 2021-04-08

## 2021-03-07 DIAGNOSIS — F33.2 SEVERE EPISODE OF RECURRENT MAJOR DEPRESSIVE DISORDER, WITHOUT PSYCHOTIC FEATURES (HCC): ICD-10-CM

## 2021-03-07 DIAGNOSIS — F41.1 GAD (GENERALIZED ANXIETY DISORDER): ICD-10-CM

## 2021-03-08 RX ORDER — DULOXETIN HYDROCHLORIDE 60 MG/1
60 CAPSULE, DELAYED RELEASE ORAL DAILY
Qty: 30 CAPSULE | Refills: 0 | OUTPATIENT
Start: 2021-03-08

## 2021-03-08 NOTE — TELEPHONE ENCOUNTER
Received fax from pharmacy requesting refill on pts medication(s). Pt was last seen in office on 2/17/2020  and has a follow up scheduled for Visit date not found. Will send request to  Pharmacy  for patient   .      Requested Prescriptions     Pending Prescriptions Disp Refills    DULoxetine (CYMBALTA) 60 MG extended release capsule [Pharmacy Med Name: DULOXETINE HCL DR 60 MG CAP] 30 capsule 0     Sig: TAKE 1 CAPSULE BY MOUTH DAILY (MUST BE SEEN BEFORE FURTHER REFILLS)

## 2021-03-09 DIAGNOSIS — G47.09 OTHER INSOMNIA: ICD-10-CM

## 2021-03-09 RX ORDER — TRAZODONE HYDROCHLORIDE 100 MG/1
100 TABLET ORAL NIGHTLY
Qty: 30 TABLET | Refills: 0 | Status: SHIPPED | OUTPATIENT
Start: 2021-03-09 | End: 2021-05-06 | Stop reason: SDUPTHER

## 2021-03-15 DIAGNOSIS — G89.29 CHRONIC BILATERAL LOW BACK PAIN WITH LEFT-SIDED SCIATICA: ICD-10-CM

## 2021-03-15 DIAGNOSIS — M54.42 CHRONIC BILATERAL LOW BACK PAIN WITH LEFT-SIDED SCIATICA: ICD-10-CM

## 2021-03-15 RX ORDER — GABAPENTIN 800 MG/1
800 TABLET ORAL 4 TIMES DAILY
Qty: 120 TABLET | Refills: 0 | Status: SHIPPED | OUTPATIENT
Start: 2021-03-15 | End: 2021-04-14 | Stop reason: SDUPTHER

## 2021-03-15 NOTE — TELEPHONE ENCOUNTER
Caller: Raimundo Ramos    Relationship: Self    Best call back number: [phone]     Medication needed:   Requested Prescriptions     Pending Prescriptions Disp Refills   • gabapentin (Neurontin) 800 MG tablet 120 tablet 0     Sig: Take 1 tablet by mouth 4 (Four) Times a Day.       When do you need the refill by: 03/15/2021    What details did the patient provide when requesting the medication: COMPLETELY OUT    Does the patient have less than a 3 day supply:  [x] Yes  [] No    What is the patient's preferred pharmacy: [facility]/PHARMACY #[id number] - [place] - [address] [phone] [facility] [phone]

## 2021-03-16 DIAGNOSIS — I10 ESSENTIAL HYPERTENSION: ICD-10-CM

## 2021-03-18 RX ORDER — LISINOPRIL 40 MG/1
40 TABLET ORAL DAILY
Qty: 30 TABLET | Refills: 5 | Status: SHIPPED | OUTPATIENT
Start: 2021-03-18 | End: 2021-09-30

## 2021-03-27 DIAGNOSIS — F41.1 GAD (GENERALIZED ANXIETY DISORDER): ICD-10-CM

## 2021-03-27 DIAGNOSIS — F33.2 SEVERE EPISODE OF RECURRENT MAJOR DEPRESSIVE DISORDER, WITHOUT PSYCHOTIC FEATURES (HCC): ICD-10-CM

## 2021-03-28 DIAGNOSIS — R12 HEARTBURN: ICD-10-CM

## 2021-03-29 RX ORDER — PANTOPRAZOLE SODIUM 40 MG/1
TABLET, DELAYED RELEASE ORAL
Qty: 30 TABLET | Refills: 0 | OUTPATIENT
Start: 2021-03-29

## 2021-03-29 RX ORDER — DULOXETIN HYDROCHLORIDE 60 MG/1
60 CAPSULE, DELAYED RELEASE ORAL DAILY
Qty: 30 CAPSULE | Refills: 0 | OUTPATIENT
Start: 2021-03-29

## 2021-03-29 NOTE — TELEPHONE ENCOUNTER
Received fax from pharmacy requesting refill on pts medication(s). Pt was last seen in office on 2/17/2020  and has a follow up scheduled for Visit date not found. Will send request to  Amanda Goncalves  for patient.      Requested Prescriptions     Pending Prescriptions Disp Refills    DULoxetine (CYMBALTA) 60 MG extended release capsule [Pharmacy Med Name: DULOXETINE HCL DR 60 MG CAP] 30 capsule 0     Sig: TAKE 1 CAPSULE BY MOUTH DAILY (MUST BE SEEN BEFORE FURTHER REFILLS)

## 2021-04-02 RX ORDER — DULOXETIN HYDROCHLORIDE 60 MG/1
60 CAPSULE, DELAYED RELEASE ORAL DAILY
Qty: 30 CAPSULE | Refills: 0 | Status: SHIPPED | OUTPATIENT
Start: 2021-04-02 | End: 2021-04-08 | Stop reason: SDUPTHER

## 2021-04-08 ENCOUNTER — OFFICE VISIT (OUTPATIENT)
Dept: INTERNAL MEDICINE | Facility: CLINIC | Age: 56
End: 2021-04-08

## 2021-04-08 VITALS
WEIGHT: 275 LBS | OXYGEN SATURATION: 98 % | DIASTOLIC BLOOD PRESSURE: 87 MMHG | BODY MASS INDEX: 38.5 KG/M2 | HEART RATE: 79 BPM | HEIGHT: 71 IN | SYSTOLIC BLOOD PRESSURE: 124 MMHG | TEMPERATURE: 98.5 F

## 2021-04-08 DIAGNOSIS — F90.9 ADULT ADHD: ICD-10-CM

## 2021-04-08 DIAGNOSIS — R79.89 LOW TESTOSTERONE: ICD-10-CM

## 2021-04-08 DIAGNOSIS — F33.42 RECURRENT MAJOR DEPRESSIVE DISORDER, IN FULL REMISSION (HCC): Primary | ICD-10-CM

## 2021-04-08 PROCEDURE — 99213 OFFICE O/P EST LOW 20 MIN: CPT | Performed by: INTERNAL MEDICINE

## 2021-04-08 RX ORDER — ATOMOXETINE 60 MG/1
60 CAPSULE ORAL DAILY
Qty: 30 CAPSULE | Refills: 1 | Status: SHIPPED | OUTPATIENT
Start: 2021-04-08 | End: 2021-04-08

## 2021-04-08 RX ORDER — TESTOSTERONE CYPIONATE 200 MG/ML
200 INJECTION, SOLUTION INTRAMUSCULAR
Qty: 2 ML | Refills: 2 | Status: SHIPPED | OUTPATIENT
Start: 2021-04-08 | End: 2021-06-22

## 2021-04-08 RX ORDER — DULOXETIN HYDROCHLORIDE 60 MG/1
60 CAPSULE, DELAYED RELEASE ORAL DAILY
Qty: 90 CAPSULE | Refills: 3 | Status: SHIPPED | OUTPATIENT
Start: 2021-04-08 | End: 2021-04-28 | Stop reason: SDUPTHER

## 2021-04-08 RX ORDER — ATOMOXETINE 60 MG/1
60 CAPSULE ORAL DAILY
Qty: 30 CAPSULE | Refills: 1 | Status: SHIPPED | OUTPATIENT
Start: 2021-04-08 | End: 2021-08-11 | Stop reason: SDUPTHER

## 2021-04-08 NOTE — PROGRESS NOTES
Subjective     Chief Complaint   Patient presents with   • Long term use of drug     3 mo fu       Hypertension  This is a chronic problem. The current episode started more than 1 year ago. The problem has been resolved since onset. The problem is controlled. Pertinent negatives include no chest pain or shortness of breath. There are no associated agents to hypertension. Risk factors for coronary artery disease include male gender and obesity. Current antihypertension treatment includes diuretics and calcium channel blockers. The current treatment provides significant improvement. There are no compliance problems.      Patient states that he is a little bit better.     Patient's PMR from outside medical facility reviewed and noted.    Review of Systems   Constitutional: Negative for chills and fever.   HENT: Negative for congestion and rhinorrhea.    Respiratory: Negative for cough and shortness of breath.    Cardiovascular: Negative for chest pain and leg swelling.   Gastrointestinal: Negative for constipation and diarrhea.   Genitourinary: Negative for dysuria and hematuria.   Psychiatric/Behavioral: Positive for dysphoric mood. The patient is nervous/anxious.       Otherwise complete ROS reviewed and negative except as mentioned in the HPI.    Past Medical History:   Past Medical History:   Diagnosis Date   • Anxiety    • Depression    • Elevated cholesterol    • Hypertension      Past Surgical History:  Past Surgical History:   Procedure Laterality Date   • WOUND CLOSURE Right 5/28/2019    Procedure: Complex closure of open scalp wound avulsion defect 7x 4.5 cm with rotation/ advancement flap closure 9cm x 5 cm;  Surgeon: Fam Cardoso MD;  Location: [facility];  Service: ENT     Social History:  reports that he has been smoking cigarettes. He has a 19.00 pack-year smoking history. He has never used smokeless tobacco. He reports previous alcohol use. He reports that he does not use  drugs.    Family History: family history includes Diabetes in his mother; Hyperlipidemia in his mother; Hypertension in his father and mother; Stroke in his maternal grandfather.      Allergies:  No Known Allergies  Medications:  Prior to Admission medications    Medication Sig Start Date End Date Taking? Authorizing Provider   Bacitracin-Polymyxin B ([facility] POLY BACITRACIN) [id number] UNIT/GM ointment APPLY TOPICALLY TO THE APPROPRIATE AREA AS DIRECTED 2 (TWO) TIMES A DAY FOR 7 DAYS. 6/25/19  Yes Fam Cardoso MD   buprenorphine-naloxone (SUBOXONE) 8-2 MG per SL tablet Place 1 tablet under the tongue Daily.   Yes ProviderJanie MD   clotrimazole-betamethasone (Lotrisone) 1-0.05 % cream Apply  topically to the appropriate area as directed 2 (Two) Times a Day. 2/8/21  Yes Nella Flores DO   DULoxetine (CYMBALTA) 60 MG capsule Take 1 capsule by mouth Daily for 30 days. 4/2/21 5/2/21 Yes Marcelle Ryan DO   fluticasone (FLONASE) 50 MCG/ACT nasal spray 2 sprays into the nostril(s) as directed by provider Daily. 2/15/21  Yes Nella Flores DO   gabapentin (Neurontin) 800 MG tablet Take 1 tablet by mouth 4 (Four) Times a Day. 3/15/21  Yes Nella Flores DO   ibuprofen (ADVIL,MOTRIN) 800 MG tablet Take 1 tablet by mouth 2 (Two) Times a Day. 12/30/20  Yes Nella Flores DO   lisinopril (PRINIVIL,ZESTRIL) 40 MG tablet Take 1 tablet by mouth Daily. 3/18/21  Yes Nella Flores DO   Loratadine (CLARITIN PO) Take 10 mg by mouth Daily.   Yes Janie Bueno MD   nicotine (NICODERM CQ) 21 MG/24HR patch Place 21 patches on the skin as directed by provider Daily. 1/7/20  Yes Janie Bueno MD   Omega-3 Fatty Acids (FISH OIL) 1000 MG capsule capsule Take 1,000 mg by mouth Daily With Breakfast.   Yes Janie Bueno MD   pseudoephedrine (Sudafed) 30 MG tablet Take 1 tablet by mouth Every 6 (Six) Hours As Needed for Congestion. 2/18/21  Yes Nella Flores, DO    tamsulosin (Flomax) 0.4 MG capsule 24 hr capsule Take 1 capsule by mouth Daily. 2/8/21  Yes Nella Flores DO   traZODone (DESYREL) 100 MG tablet Take 1 tablet by mouth Every Night. 3/9/21  Yes Nella Flores DO   acetaminophen (TYLENOL) 325 MG tablet Take 2 tablets by mouth Every 4 (Four) Hours As Needed for Mild Pain . 5/28/19 4/8/21  Fam Cardoso MD   amitriptyline (ELAVIL) 25 MG tablet Take 1 tablet by mouth Every Night. 1/5/21 4/8/21  Nella Flores DO   amLODIPine (NORVASC) 5 MG tablet Take 5 mg by mouth Daily.  4/8/21  Janie Bueno MD   ARIPiprazole (ABILIFY) 5 MG tablet Take 5 mg by mouth Daily.  4/8/21  Janie Bueno MD   azithromycin (Zithromax Z-Asa) 250 MG tablet Take 2 tablets by mouth on day 1, then 1 tablet daily on days 2-5 2/18/21 4/8/21  Nella Flores DO   diazePAM (Valium) 5 MG tablet Take 1 tablet by mouth At Night As Needed for Anxiety or Sleep. 6/18/20 4/8/21  Nella Flores DO   furosemide (Lasix) 20 MG tablet Take 1 tablet by mouth Daily. 2/8/21 4/8/21  Nella Flores DO   hydroCHLOROthiazide (HYDRODIURIL) 25 MG tablet Take 25 mg by mouth Daily.  4/8/21  Janie Bueno MD   hydrOXYzine pamoate (VISTARIL) 100 MG capsule Take 1 capsule by mouth 3 (Three) Times a Day As Needed for Anxiety. 10/29/20 4/8/21  Nella Flores DO   pantoprazole (PROTONIX) 40 MG EC tablet TAKE 1 TABLET BY MOUTH EVERY DAY 2/24/21 4/8/21  Leida Amador APRN   sodium-potassium-magnesium sulfates (Suprep Bowel Prep Kit) 17.5-3.13-1.6 GM/177ML solution oral solution Take 1 bottle by mouth Every 12 (Twelve) Hours. Split dose prep as directed by office instructions provided.  2 bottles = one kit. 3/26/20 4/8/21  Leida Amador APRN   sucralfate (Carafate) 1 g tablet Take 1 tablet by mouth 4 (Four) Times a Day. 3/26/20 4/8/21  Leida Amador APRN       Objective     Vital Signs: /87 (BP Location: Left arm, Patient Position: Sitting, Cuff Size: Adult)  "  Pulse 79   Temp 98.5 °F (36.9 °C) (Infrared)   Ht 180.3 cm (71\")   Wt 125 kg (275 lb)   SpO2 98%   BMI 38.35 kg/m²   Physical Exam  Vitals reviewed.   Constitutional:       Appearance: He is obese.   HENT:      Head: Normocephalic and atraumatic.      Nose: Nose normal.   Eyes:      General: No scleral icterus.     Conjunctiva/sclera: Conjunctivae normal.   Cardiovascular:      Rate and Rhythm: Normal rate and regular rhythm.      Heart sounds: Normal heart sounds.   Pulmonary:      Effort: Pulmonary effort is normal.      Breath sounds: Normal breath sounds.   Musculoskeletal:         General: No swelling or tenderness.      Cervical back: Normal range of motion and neck supple.   Skin:     General: Skin is warm and dry.   Neurological:      General: No focal deficit present.      Mental Status: He is alert.      Cranial Nerves: No cranial nerve deficit.   Psychiatric:         Mood and Affect: Mood normal.         Behavior: Behavior normal.       Patient's Body mass index is 38.35 kg/m². BMI is above normal parameters. Recommendations include: exercise counseling.      Results Reviewed:  Glucose   Date Value Ref Range Status   02/18/2020 79 74 - 109 mg/dL Final     BUN   Date Value Ref Range Status   02/18/2020 20 6 - 20 mg/dL Final     Creatinine   Date Value Ref Range Status   02/18/2020 0.7 0.5 - 1.2 mg/dL Final     Sodium   Date Value Ref Range Status   02/18/2020 140 136 - 145 mmol/L Final     Potassium   Date Value Ref Range Status   02/18/2020 4.4 3.5 - 5.0 mmol/L Final     Chloride   Date Value Ref Range Status   02/18/2020 102 98 - 111 mmol/L Final     CO2   Date Value Ref Range Status   02/18/2020 24 22 - 29 mmol/L Final     Calcium   Date Value Ref Range Status   02/18/2020 9.4 8.6 - 10.0 mg/dL Final     ALT (SGPT)   Date Value Ref Range Status   02/18/2020 8 5 - 41 U/L Final     AST (SGOT)   Date Value Ref Range Status   02/18/2020 12 5 - 40 U/L Final     WBC   Date Value Ref Range Status "   02/18/2020 7.5 4.8 - 10.8 K/uL Final     Hematocrit   Date Value Ref Range Status   02/18/2020 39.5 (L) 42.0 - 52.0 % Final     Platelets   Date Value Ref Range Status   02/18/2020 281 130 - 400 K/uL Final     Triglycerides   Date Value Ref Range Status   02/18/2020 98 0 - 149 mg/dL Final     HDL Cholesterol   Date Value Ref Range Status   02/18/2020 44 (L) 55 - 121 mg/dL Final     Comment:     VALUES>60 MG/DL ARE ASSOCIATED WITH A DECREASED RISK OF  ATHEROSCLEROTIC CARDIOVASCULAR DISEASE     LDL Cholesterol    Date Value Ref Range Status   02/18/2020 77 <100 mg/dL Final     Comment:     <100 MG/DL=OPITIMAL    100-129 MG/DL=DESIRABLE    130-159 MG/DL BORDERLINE=INCREASED RISK OF ATHEROSCLEROTIC  CARDIOVASCULAR DISEASE    > OR = 160 MG/DL=ASSOCIATED WITH AN INCREASE RISK OF  ATHEROSCLEROTIC CARDIOVASCULAR DISEASE         Assessment / Plan     Assessment/Plan:  1. Recurrent major depressive disorder, in full remission (CMS/HCC)  - DULoxetine (CYMBALTA) 60 MG capsule; Take 1 capsule by mouth Daily for 30 days.  Dispense: 90 capsule; Refill: 3    2. Adult ADHD/Patient had ADD as a child and had difficulties in school also associated with anxiety  - atomoxetine (Strattera) 60 MG capsule; Take 1 capsule by mouth Daily.  Dispense: 30 capsule; Refill: 1  - hope to also aid with the SNRI effects    3. Low testosterone with decreased energy  - Testosterone injection IM  - Patient will bring the injections to the clinic for injections as he states eh previously quit them because he could not do self injections.     Patient states that he will come in for labs tomorrow.     Return in about 4 weeks (around 5/6/2021) for Recheck, Next scheduled follow up. unless patient needs to be seen sooner or acute issues arise.    Code Status: Full    I have discussed the patient results/orders and and plan/recommendation with them at today's visit.      Nella Flores,    04/08/2021

## 2021-04-09 ENCOUNTER — CLINICAL SUPPORT (OUTPATIENT)
Dept: INTERNAL MEDICINE | Facility: CLINIC | Age: 56
End: 2021-04-09

## 2021-04-09 DIAGNOSIS — I10 ESSENTIAL HYPERTENSION: ICD-10-CM

## 2021-04-09 DIAGNOSIS — R60.0 FACIAL EDEMA: ICD-10-CM

## 2021-04-09 DIAGNOSIS — Z12.5 PROSTATE CANCER SCREENING: ICD-10-CM

## 2021-04-09 DIAGNOSIS — R53.83 FATIGUE, UNSPECIFIED TYPE: ICD-10-CM

## 2021-04-09 PROCEDURE — 36415 COLL VENOUS BLD VENIPUNCTURE: CPT | Performed by: FAMILY MEDICINE

## 2021-04-09 NOTE — PROGRESS NOTES
Venipuncture Blood Specimen Collection  Venipuncture performed in right ac by Ericka Dyson MA with good hemostasis. Patient tolerated the procedure well without complications.   04/09/21   Ericka Dyson MA

## 2021-04-10 LAB
ALBUMIN SERPL-MCNC: 4.4 G/DL (ref 3.8–4.9)
ALBUMIN/GLOB SERPL: 1.3 {RATIO} (ref 1.2–2.2)
ALP SERPL-CCNC: 112 IU/L (ref 39–117)
ALT SERPL-CCNC: 12 IU/L (ref 0–44)
AST SERPL-CCNC: 14 IU/L (ref 0–40)
BASOPHILS # BLD AUTO: 0 X10E3/UL (ref 0–0.2)
BASOPHILS NFR BLD AUTO: 1 %
BILIRUB SERPL-MCNC: 0.4 MG/DL (ref 0–1.2)
BNP SERPL-MCNC: 10.4 PG/ML (ref 0–100)
BUN SERPL-MCNC: 16 MG/DL (ref 6–24)
BUN/CREAT SERPL: 17 (ref 9–20)
CALCIUM SERPL-MCNC: 9.3 MG/DL (ref 8.7–10.2)
CHLORIDE SERPL-SCNC: 98 MMOL/L (ref 96–106)
CHOLEST SERPL-MCNC: 138 MG/DL (ref 100–199)
CO2 SERPL-SCNC: 25 MMOL/L (ref 20–29)
CREAT SERPL-MCNC: 0.92 MG/DL (ref 0.76–1.27)
EOSINOPHIL # BLD AUTO: 0.2 X10E3/UL (ref 0–0.4)
EOSINOPHIL NFR BLD AUTO: 2 %
ERYTHROCYTE [DISTWIDTH] IN BLOOD BY AUTOMATED COUNT: 13.6 % (ref 11.6–15.4)
GLOBULIN SER CALC-MCNC: 3.5 G/DL (ref 1.5–4.5)
GLUCOSE SERPL-MCNC: 115 MG/DL (ref 65–99)
HCT VFR BLD AUTO: 39.8 % (ref 37.5–51)
HDLC SERPL-MCNC: 47 MG/DL
HGB BLD-MCNC: 12.7 G/DL (ref 13–17.7)
IMM GRANULOCYTES # BLD AUTO: 0 X10E3/UL (ref 0–0.1)
IMM GRANULOCYTES NFR BLD AUTO: 0 %
LDLC SERPL CALC-MCNC: 72 MG/DL (ref 0–99)
LYMPHOCYTES # BLD AUTO: 2.5 X10E3/UL (ref 0.7–3.1)
LYMPHOCYTES NFR BLD AUTO: 32 %
MCH RBC QN AUTO: 27.7 PG (ref 26.6–33)
MCHC RBC AUTO-ENTMCNC: 31.9 G/DL (ref 31.5–35.7)
MCV RBC AUTO: 87 FL (ref 79–97)
MONOCYTES # BLD AUTO: 0.6 X10E3/UL (ref 0.1–0.9)
MONOCYTES NFR BLD AUTO: 7 %
NEUTROPHILS # BLD AUTO: 4.6 X10E3/UL (ref 1.4–7)
NEUTROPHILS NFR BLD AUTO: 58 %
PLATELET # BLD AUTO: 288 X10E3/UL (ref 150–450)
POTASSIUM SERPL-SCNC: 4.7 MMOL/L (ref 3.5–5.2)
PROT SERPL-MCNC: 7.9 G/DL (ref 6–8.5)
PSA SERPL-MCNC: 0.1 NG/ML (ref 0–4)
RBC # BLD AUTO: 4.59 X10E6/UL (ref 4.14–5.8)
SODIUM SERPL-SCNC: 137 MMOL/L (ref 134–144)
T4 SERPL-MCNC: 9.2 UG/DL (ref 4.5–12)
TRIGL SERPL-MCNC: 102 MG/DL (ref 0–149)
TSH SERPL DL<=0.005 MIU/L-ACNC: 1.1 UIU/ML (ref 0.45–4.5)
VLDLC SERPL CALC-MCNC: 19 MG/DL (ref 5–40)
WBC # BLD AUTO: 7.9 X10E3/UL (ref 3.4–10.8)

## 2021-04-14 ENCOUNTER — TELEPHONE (OUTPATIENT)
Dept: INTERNAL MEDICINE | Facility: CLINIC | Age: 56
End: 2021-04-14

## 2021-04-14 DIAGNOSIS — M54.42 CHRONIC BILATERAL LOW BACK PAIN WITH LEFT-SIDED SCIATICA: ICD-10-CM

## 2021-04-14 DIAGNOSIS — G89.29 CHRONIC BILATERAL LOW BACK PAIN WITH LEFT-SIDED SCIATICA: ICD-10-CM

## 2021-04-14 RX ORDER — GABAPENTIN 800 MG/1
800 TABLET ORAL 4 TIMES DAILY
Qty: 120 TABLET | Refills: 0 | Status: SHIPPED | OUTPATIENT
Start: 2021-04-14 | End: 2021-05-06 | Stop reason: SDUPTHER

## 2021-04-14 NOTE — TELEPHONE ENCOUNTER
Caller: Raimundo Ramos    Relationship: Self    Best call back number: [phone]     Medication needed:   Requested Prescriptions     Pending Prescriptions Disp Refills   • gabapentin (Neurontin) 800 MG tablet 120 tablet 0     Sig: Take 1 tablet by mouth 4 (Four) Times a Day.       When do you need the refill by: 04/14/21     What additional details did the patient provide when requesting the medication: PATIENT IS OUT OF HIS MEDICATION TODAY. PLEASE CALL AND ADVISE.     Does the patient have less than a 3 day supply:  [x] Yes  [] No    What is the patient's preferred pharmacy: [facility]/PHARMACY #[id number] - [place], [place] - [address] [phone] [facility] [phone]

## 2021-04-28 DIAGNOSIS — F33.42 RECURRENT MAJOR DEPRESSIVE DISORDER, IN FULL REMISSION (HCC): ICD-10-CM

## 2021-04-28 NOTE — TELEPHONE ENCOUNTER
Caller: Raimundo Ramos    Relationship: Self    Best call back number: [phone]    Medication needed:   Requested Prescriptions     Pending Prescriptions Disp Refills   • DULoxetine (CYMBALTA) 60 MG capsule 90 capsule 3     Sig: Take 1 capsule by mouth Daily for 30 days.       When do you need the refill by: 4 DAYS LEFT    What additional details did the patient provide when requesting the medication: PT WAS INFORMED HE WAS OUT OF REFILLS BY PHARMACY.     Does the patient have less than a 3 day supply:  [] Yes  [x] No    What is the patient's preferred pharmacy: [facility]/PHARMACY #[id number] - [address] [phone] [facility] [phone]

## 2021-04-29 RX ORDER — DULOXETIN HYDROCHLORIDE 60 MG/1
60 CAPSULE, DELAYED RELEASE ORAL DAILY
Qty: 90 CAPSULE | Refills: 3 | Status: SHIPPED | OUTPATIENT
Start: 2021-04-29 | End: 2022-01-25 | Stop reason: SDUPTHER

## 2021-05-03 DIAGNOSIS — R12 HEARTBURN: ICD-10-CM

## 2021-05-03 RX ORDER — PANTOPRAZOLE SODIUM 40 MG/1
TABLET, DELAYED RELEASE ORAL
Qty: 30 TABLET | Refills: 0 | OUTPATIENT
Start: 2021-05-03

## 2021-05-06 ENCOUNTER — OFFICE VISIT (OUTPATIENT)
Dept: INTERNAL MEDICINE | Facility: CLINIC | Age: 56
End: 2021-05-06

## 2021-05-06 VITALS
TEMPERATURE: 97.3 F | HEIGHT: 71 IN | HEART RATE: 83 BPM | OXYGEN SATURATION: 96 % | WEIGHT: 273 LBS | SYSTOLIC BLOOD PRESSURE: 139 MMHG | BODY MASS INDEX: 38.22 KG/M2 | DIASTOLIC BLOOD PRESSURE: 84 MMHG

## 2021-05-06 DIAGNOSIS — J40 BRONCHITIS: ICD-10-CM

## 2021-05-06 DIAGNOSIS — G47.09 OTHER INSOMNIA: ICD-10-CM

## 2021-05-06 DIAGNOSIS — L03.90 WOUND CELLULITIS: ICD-10-CM

## 2021-05-06 DIAGNOSIS — M54.42 CHRONIC BILATERAL LOW BACK PAIN WITH LEFT-SIDED SCIATICA: ICD-10-CM

## 2021-05-06 DIAGNOSIS — Z71.6 ENCOUNTER FOR TOBACCO USE CESSATION COUNSELING: Primary | ICD-10-CM

## 2021-05-06 DIAGNOSIS — G89.29 CHRONIC BILATERAL LOW BACK PAIN WITH LEFT-SIDED SCIATICA: ICD-10-CM

## 2021-05-06 PROCEDURE — 99214 OFFICE O/P EST MOD 30 MIN: CPT | Performed by: INTERNAL MEDICINE

## 2021-05-06 RX ORDER — GABAPENTIN 800 MG/1
800 TABLET ORAL 4 TIMES DAILY
Qty: 120 TABLET | Refills: 0 | Status: SHIPPED | OUTPATIENT
Start: 2021-05-06 | End: 2021-06-11 | Stop reason: SDUPTHER

## 2021-05-06 RX ORDER — TRAZODONE HYDROCHLORIDE 100 MG/1
100 TABLET ORAL NIGHTLY
Qty: 30 TABLET | Refills: 0 | Status: SHIPPED | OUTPATIENT
Start: 2021-05-06 | End: 2021-05-18

## 2021-05-06 RX ORDER — ALBUTEROL SULFATE 90 UG/1
2 AEROSOL, METERED RESPIRATORY (INHALATION) EVERY 4 HOURS PRN
Qty: 18 G | Refills: 1 | Status: SHIPPED | OUTPATIENT
Start: 2021-05-06 | End: 2021-06-16 | Stop reason: SDUPTHER

## 2021-05-06 RX ORDER — METHYLPREDNISOLONE 4 MG/1
TABLET ORAL
Qty: 21 TABLET | Refills: 0 | Status: SHIPPED | OUTPATIENT
Start: 2021-05-06 | End: 2021-06-10

## 2021-05-06 RX ORDER — AZITHROMYCIN 250 MG/1
TABLET, FILM COATED ORAL
Qty: 6 TABLET | Refills: 0 | Status: SHIPPED | OUTPATIENT
Start: 2021-05-06 | End: 2021-06-10

## 2021-05-06 RX ORDER — BUPROPION HYDROCHLORIDE 150 MG/1
150 TABLET ORAL DAILY
Qty: 30 TABLET | Refills: 1 | Status: SHIPPED | OUTPATIENT
Start: 2021-05-06 | End: 2021-06-14 | Stop reason: SDUPTHER

## 2021-05-06 NOTE — PROGRESS NOTES
Subjective     Chief Complaint   Patient presents with   • long term drug use     follow up,  pt due for Colon screening       Back Pain  This is a chronic problem. The current episode started more than 1 year ago. The problem occurs daily. The problem is unchanged (Unchanged with medication). The pain is present in the lumbar spine. The quality of the pain is described as aching. The pain radiates to the left thigh. The pain is at a severity of 4/10. The pain is mild. The pain is worse during the night. Exacerbated by: Motion of the back. Stiffness is present all day. Associated symptoms include numbness, tingling and weakness. Pertinent negatives include no chest pain or fever. Risk factors include lack of exercise. He has tried NSAIDs and muscle relaxant for the symptoms. The treatment provided mild relief.     Right head wound. Has been picking on it.     Wheezing for several weeks.     Patient's PMR from outside medical facility reviewed and noted.    Review of Systems   Constitutional: Negative for chills and fever.   HENT: Negative for congestion and rhinorrhea.    Respiratory: Negative for cough and shortness of breath.    Cardiovascular: Negative for chest pain and leg swelling.   Gastrointestinal: Negative for constipation and diarrhea.   Musculoskeletal: Positive for back pain and neck pain.   Skin: Positive for color change and wound.   Neurological: Positive for tingling, weakness and numbness.      Otherwise complete ROS reviewed and negative except as mentioned in the HPI.    Past Medical History:   Past Medical History:   Diagnosis Date   • Anxiety    • Depression    • Elevated cholesterol    • Hypertension      Past Surgical History:  Past Surgical History:   Procedure Laterality Date   • WOUND CLOSURE Right 5/28/2019    Procedure: Complex closure of open scalp wound avulsion defect 7x 4.5 cm with rotation/ advancement flap closure 9cm x 5 cm;  Surgeon: Fam Cardoso MD;  Location:   [facility] OR;  Service: ENT     Social History:  reports that he has been smoking cigarettes. He has a 19.00 pack-year smoking history. He has never used smokeless tobacco. He reports previous alcohol use. He reports that he does not use drugs.    Family History: family history includes Diabetes in his mother; Hyperlipidemia in his mother; Hypertension in his father and mother; Stroke in his maternal grandfather.       Allergies:  No Known Allergies  Medications:  Prior to Admission medications    Medication Sig Start Date End Date Taking? Authorizing Provider   atomoxetine (Strattera) 60 MG capsule Take 1 capsule by mouth Daily. 4/8/21  Yes Nella Flores DO   Bacitracin-Polymyxin B ([facility] POLY BACITRACIN) [id number] UNIT/GM ointment APPLY TOPICALLY TO THE APPROPRIATE AREA AS DIRECTED 2 (TWO) TIMES A DAY FOR 7 DAYS. 6/25/19  Yes Fam Cardoso MD   buprenorphine-naloxone (SUBOXONE) 8-2 MG per SL tablet Place 1 tablet under the tongue Daily.   Yes ProviderJanie MD   clotrimazole-betamethasone (Lotrisone) 1-0.05 % cream Apply  topically to the appropriate area as directed 2 (Two) Times a Day. 2/8/21  Yes Nella Flores DO   DULoxetine (CYMBALTA) 60 MG capsule Take 1 capsule by mouth Daily for 30 days. 4/29/21 5/29/21 Yes Nella Flores DO   fluticasone (FLONASE) 50 MCG/ACT nasal spray 2 sprays into the nostril(s) as directed by provider Daily. 2/15/21  Yes Nella Flores DO   gabapentin (Neurontin) 800 MG tablet Take 1 tablet by mouth 4 (Four) Times a Day. 4/14/21  Yes Nella Flores DO   ibuprofen (ADVIL,MOTRIN) 800 MG tablet Take 1 tablet by mouth 2 (Two) Times a Day. 12/30/20  Yes Nella Flores DO   lisinopril (PRINIVIL,ZESTRIL) 40 MG tablet Take 1 tablet by mouth Daily. 3/18/21  Yes Nella Flores DO   Loratadine (CLARITIN PO) Take 10 mg by mouth Daily.   Yes ProviderJanie MD   nicotine (NICODERM CQ) 21 MG/24HR patch Place 21 patches on the skin as directed by  "provider Daily. 1/7/20  Yes Janie Bueno MD   Omega-3 Fatty Acids (FISH OIL) 1000 MG capsule capsule Take 1,000 mg by mouth Daily With Breakfast.   Yes Janie Bueno MD   pseudoephedrine (Sudafed) 30 MG tablet Take 1 tablet by mouth Every 6 (Six) Hours As Needed for Congestion. 2/18/21  Yes Nella Flores DO   tamsulosin (Flomax) 0.4 MG capsule 24 hr capsule Take 1 capsule by mouth Daily. 2/8/21  Yes Nella Flores DO   Testosterone Cypionate (Depo-Testosterone) 200 MG/ML injection Inject 1 mL into the appropriate muscle as directed by prescriber Every 14 (Fourteen) Days. 4/8/21  Yes Nella Flores DO   traZODone (DESYREL) 100 MG tablet Take 1 tablet by mouth Every Night. 3/9/21  Yes Nella Flores DO       Objective     Vital Signs: /84 (BP Location: Left arm, Patient Position: Sitting, Cuff Size: Adult)   Pulse 83   Temp 97.3 °F (36.3 °C) (Infrared)   Ht 180.3 cm (71\")   Wt 124 kg (273 lb)   SpO2 96%   BMI 38.08 kg/m²   Physical Exam  Vitals reviewed.   Constitutional:       Appearance: He is obese.   HENT:      Head: Normocephalic and atraumatic.      Nose: Nose normal.   Eyes:      General: No scleral icterus.     Conjunctiva/sclera: Conjunctivae normal.   Cardiovascular:      Rate and Rhythm: Normal rate and regular rhythm.      Heart sounds: Normal heart sounds.   Pulmonary:      Effort: Pulmonary effort is normal.      Breath sounds: Wheezing and rhonchi present.   Musculoskeletal:         General: No swelling or tenderness.      Cervical back: Normal range of motion and neck supple.      Comments: Decreased cervical spine ROM to the left rotation.    Skin:     General: Skin is warm and dry.   Neurological:      General: No focal deficit present.      Mental Status: He is alert.      Cranial Nerves: No cranial nerve deficit.   Psychiatric:         Mood and Affect: Mood normal.         Behavior: Behavior normal.         Patient's (Body mass index is 38.08 " kg/m².) indicates that they are morbidly obese (BMI > 40 or > 35 with obesity - related health condition) with obesity-related health conditions that include hypertension . Obesity is unchanged. BMI is is above average; BMI management plan is completed. We discussed low calorie, low carb based diet program and portion control.      Results Reviewed:  Glucose   Date Value Ref Range Status   02/18/2020 79 74 - 109 mg/dL Final     BUN   Date Value Ref Range Status   04/09/2021 16 6 - 24 mg/dL Final   02/18/2020 20 6 - 20 mg/dL Final     Creatinine   Date Value Ref Range Status   04/09/2021 0.92 0.76 - 1.27 mg/dL Final   02/18/2020 0.7 0.5 - 1.2 mg/dL Final     Sodium   Date Value Ref Range Status   04/09/2021 137 134 - 144 mmol/L Final   02/18/2020 140 136 - 145 mmol/L Final     Potassium   Date Value Ref Range Status   04/09/2021 4.7 3.5 - 5.2 mmol/L Final   02/18/2020 4.4 3.5 - 5.0 mmol/L Final     Chloride   Date Value Ref Range Status   04/09/2021 98 96 - 106 mmol/L Final   02/18/2020 102 98 - 111 mmol/L Final     CO2   Date Value Ref Range Status   02/18/2020 24 22 - 29 mmol/L Final     Total CO2   Date Value Ref Range Status   04/09/2021 25 20 - 29 mmol/L Final     Calcium   Date Value Ref Range Status   04/09/2021 9.3 8.7 - 10.2 mg/dL Final   02/18/2020 9.4 8.6 - 10.0 mg/dL Final     ALT (SGPT)   Date Value Ref Range Status   04/09/2021 12 0 - 44 IU/L Final   02/18/2020 8 5 - 41 U/L Final     AST (SGOT)   Date Value Ref Range Status   04/09/2021 14 0 - 40 IU/L Final   02/18/2020 12 5 - 40 U/L Final     WBC   Date Value Ref Range Status   04/09/2021 7.9 3.4 - 10.8 x10E3/uL Final   02/18/2020 7.5 4.8 - 10.8 K/uL Final     Hematocrit   Date Value Ref Range Status   04/09/2021 39.8 37.5 - 51.0 % Final   02/18/2020 39.5 (L) 42.0 - 52.0 % Final     Platelets   Date Value Ref Range Status   04/09/2021 288 150 - 450 x10E3/uL Final   02/18/2020 281 130 - 400 K/uL Final     Triglycerides   Date Value Ref Range Status      04/09/2021 102 0 - 149 mg/dL Final   02/18/2020 98 0 - 149 mg/dL Final     HDL Cholesterol   Date Value Ref Range Status   04/09/2021 47 >39 mg/dL Final   02/18/2020 44 (L) 55 - 121 mg/dL Final     Comment:     VALUES>60 MG/DL ARE ASSOCIATED WITH A DECREASED RISK OF  ATHEROSCLEROTIC CARDIOVASCULAR DISEASE     LDL Cholesterol    Date Value Ref Range Status   02/18/2020 77 <100 mg/dL Final     Comment:     <100 MG/DL=OPITIMAL    100-129 MG/DL=DESIRABLE    130-159 MG/DL BORDERLINE=INCREASED RISK OF ATHEROSCLEROTIC  CARDIOVASCULAR DISEASE    > OR = 160 MG/DL=ASSOCIATED WITH AN INCREASE RISK OF  ATHEROSCLEROTIC CARDIOVASCULAR DISEASE     LDL Chol Calc (NIH)   Date Value Ref Range Status   04/09/2021 72 0 - 99 mg/dL Final         Assessment / Plan     Assessment/Plan:  1. Chronic bilateral low back pain with left-sided sciatica  - gabapentin (Neurontin) 800 MG tablet; Take 1 tablet by mouth 4 (Four) Times a Day.  Dispense: 120 tablet; Refill: 0    2. Other insomnia  - traZODone (DESYREL) 100 MG tablet; Take 1 tablet by mouth Every Night.  Dispense: 30 tablet; Refill: 0    3. Encounter for tobacco use cessation counseling  - buPROPion XL (Wellbutrin XL) 150 MG 24 hr tablet; Take 1 tablet by mouth Daily.  Dispense: 30 tablet; Refill: 1    4. Bronchitis  - azithromycin (Zithromax Z-Asa) 250 MG tablet; Take 2 tablets by mouth on day 1, then 1 tablet daily on days 2-5  Dispense: 6 tablet; Refill: 0  - methylPREDNISolone (MEDROL) 4 MG dose pack; Take as directed on package instructions.  Dispense: 21 tablet; Refill: 0  - albuterol sulfate  (90 Base) MCG/ACT inhaler; Inhale 2 puffs Every 4 (Four) Hours As Needed for Wheezing.  Dispense: 18 g; Refill: 1    5. Wound cellulitis  - mupirocin (Bactroban) 2 % ointment; Apply  topically to the appropriate area as directed 3 (Three) Times a Day.  Dispense: 1 g; Refill: 1        Return in about 3 months (around 8/6/2021) for Recheck. unless patient needs to be seen sooner or  acute issues arise.    Code Status: Full    I have discussed the patient results/orders and and plan/recommendation with them at today's visit.      Nella Flores, DO   05/06/2021

## 2021-05-18 ENCOUNTER — OFFICE VISIT (OUTPATIENT)
Dept: INTERNAL MEDICINE | Facility: CLINIC | Age: 56
End: 2021-05-18

## 2021-05-18 VITALS
WEIGHT: 281 LBS | DIASTOLIC BLOOD PRESSURE: 90 MMHG | HEIGHT: 71 IN | OXYGEN SATURATION: 98 % | HEART RATE: 79 BPM | SYSTOLIC BLOOD PRESSURE: 146 MMHG | BODY MASS INDEX: 39.34 KG/M2 | TEMPERATURE: 97.8 F

## 2021-05-18 DIAGNOSIS — G47.00 INSOMNIA, UNSPECIFIED TYPE: ICD-10-CM

## 2021-05-18 DIAGNOSIS — J02.9 PHARYNGITIS, UNSPECIFIED ETIOLOGY: Primary | ICD-10-CM

## 2021-05-18 PROCEDURE — 99213 OFFICE O/P EST LOW 20 MIN: CPT | Performed by: INTERNAL MEDICINE

## 2021-05-18 RX ORDER — TRAZODONE HYDROCHLORIDE 300 MG/1
300 TABLET ORAL NIGHTLY
Qty: 30 TABLET | Refills: 0 | Status: SHIPPED | OUTPATIENT
Start: 2021-05-18 | End: 2021-06-22

## 2021-05-18 RX ORDER — PREDNISONE 20 MG/1
20 TABLET ORAL DAILY
Qty: 5 TABLET | Refills: 0 | Status: SHIPPED | OUTPATIENT
Start: 2021-05-18 | End: 2021-06-22

## 2021-05-18 RX ORDER — LEVOFLOXACIN 750 MG/1
750 TABLET ORAL DAILY
Qty: 5 TABLET | Refills: 0 | Status: SHIPPED | OUTPATIENT
Start: 2021-05-18 | End: 2021-06-10

## 2021-05-18 NOTE — PROGRESS NOTES
Subjective     Chief Complaint   Patient presents with   • long term drug use     follow up,    • chronic low back pain       History of Present Illness  Patient had a ST that started yesterday. Hurts worse on the right. No cough and no fevers or chills.     Also states that the sleeping medication is not helping. Asks to have it increased.     Patient's PMR from outside medical facility reviewed and noted.    Review of Systems   Constitutional: Negative for chills and fever.   HENT: Positive for congestion and sore throat.    Respiratory: Negative for cough.    Cardiovascular: Negative for chest pain and leg swelling.   Gastrointestinal: Negative for constipation and diarrhea.   Genitourinary: Negative for dysuria and hematuria.   Psychiatric/Behavioral: Positive for sleep disturbance. Negative for dysphoric mood.      Otherwise complete ROS reviewed and negative except as mentioned in the HPI.    Past Medical History:   Past Medical History:   Diagnosis Date   • Anxiety    • Depression    • Elevated cholesterol    • Hypertension      Past Surgical History:  Past Surgical History:   Procedure Laterality Date   • WOUND CLOSURE Right 5/28/2019    Procedure: Complex closure of open scalp wound avulsion defect 7x 4.5 cm with rotation/ advancement flap closure 9cm x 5 cm;  Surgeon: Fam Cardoso MD;  Location: [facility];  Service: ENT     Social History:  reports that he has been smoking cigarettes. He has a 19.00 pack-year smoking history. He has never used smokeless tobacco. He reports previous alcohol use. He reports that he does not use drugs.    Family History: family history includes Diabetes in his mother; Hyperlipidemia in his mother; Hypertension in his father and mother; Stroke in his maternal grandfather.       Allergies:  No Known Allergies  Medications:  Prior to Admission medications    Medication Sig Start Date End Date Taking? Authorizing Provider   albuterol sulfate  (90 Base)  MCG/ACT inhaler Inhale 2 puffs Every 4 (Four) Hours As Needed for Wheezing. 5/6/21  Yes Nella Flores DO   atomoxetine (Strattera) 60 MG capsule Take 1 capsule by mouth Daily. 4/8/21  Yes Nella Flores DO   azithromycin (Zithromax Z-Asa) 250 MG tablet Take 2 tablets by mouth on day 1, then 1 tablet daily on days 2-5 5/6/21  Yes Nella Flores DO   Bacitracin-Polymyxin B ([facility] POLY BACITRACIN) [id number] UNIT/GM ointment APPLY TOPICALLY TO THE APPROPRIATE AREA AS DIRECTED 2 (TWO) TIMES A DAY FOR 7 DAYS. 6/25/19  Yes Fam Cardoso MD   buprenorphine-naloxone (SUBOXONE) 8-2 MG per SL tablet Place 1 tablet under the tongue Daily.   Yes Provider, MD Janie   buPROPion XL (Wellbutrin XL) 150 MG 24 hr tablet Take 1 tablet by mouth Daily. 5/6/21  Yes Nella Flores DO   clotrimazole-betamethasone (Lotrisone) 1-0.05 % cream Apply  topically to the appropriate area as directed 2 (Two) Times a Day. 2/8/21  Yes Nella Flores DO   DULoxetine (CYMBALTA) 60 MG capsule Take 1 capsule by mouth Daily for 30 days. 4/29/21 5/29/21 Yes Nella Flores DO   fluticasone (FLONASE) 50 MCG/ACT nasal spray 2 sprays into the nostril(s) as directed by provider Daily. 2/15/21  Yes Nella Flores DO   gabapentin (Neurontin) 800 MG tablet Take 1 tablet by mouth 4 (Four) Times a Day. 5/6/21  Yes Nella Flores DO   ibuprofen (ADVIL,MOTRIN) 800 MG tablet Take 1 tablet by mouth 2 (Two) Times a Day. 12/30/20  Yes Nella Flores DO   lisinopril (PRINIVIL,ZESTRIL) 40 MG tablet Take 1 tablet by mouth Daily. 3/18/21  Yes Nella Flores DO   Loratadine (CLARITIN PO) Take 10 mg by mouth Daily.   Yes Provider, MD Janie   methylPREDNISolone (MEDROL) 4 MG dose pack Take as directed on package instructions. 5/6/21  Yes Nella Flores DO   mupirocin (Bactroban) 2 % ointment Apply  topically to the appropriate area as directed 3 (Three) Times a Day. 5/6/21  Yes Nella Flores, DO    "  nicotine (NICODERM CQ) 21 MG/24HR patch Place 21 patches on the skin as directed by provider Daily. 1/7/20  Yes ProviderJanie MD   Omega-3 Fatty Acids (FISH OIL) 1000 MG capsule capsule Take 1,000 mg by mouth Daily With Breakfast.   Yes ProviderJanie MD   pseudoephedrine (Sudafed) 30 MG tablet Take 1 tablet by mouth Every 6 (Six) Hours As Needed for Congestion. 2/18/21  Yes Nella Flores DO   tamsulosin (Flomax) 0.4 MG capsule 24 hr capsule Take 1 capsule by mouth Daily. 2/8/21  Yes Nella Flores DO   Testosterone Cypionate (Depo-Testosterone) 200 MG/ML injection Inject 1 mL into the appropriate muscle as directed by prescriber Every 14 (Fourteen) Days. 4/8/21  Yes Nella Flores DO   traZODone (DESYREL) 100 MG tablet Take 1 tablet by mouth Every Night. 5/6/21  Yes Nella Flores DO       Objective     Vital Signs: /90 (BP Location: Left arm, Patient Position: Sitting, Cuff Size: Adult)   Pulse 79   Temp 97.8 °F (36.6 °C) (Infrared)   Ht 180.3 cm (71\")   Wt 127 kg (281 lb)   SpO2 98%   BMI 39.19 kg/m²   Physical Exam  Vitals reviewed.   Constitutional:       Appearance: Normal appearance.   HENT:      Head: Normocephalic and atraumatic.      Nose: Nose normal.      Mouth/Throat:      Pharynx: Posterior oropharyngeal erythema present. No oropharyngeal exudate.   Eyes:      General: No scleral icterus.     Conjunctiva/sclera: Conjunctivae normal.   Cardiovascular:      Rate and Rhythm: Normal rate and regular rhythm.      Heart sounds: Normal heart sounds.   Pulmonary:      Effort: Pulmonary effort is normal.      Breath sounds: Normal breath sounds.   Musculoskeletal:         General: No tenderness.      Cervical back: Normal range of motion and neck supple.   Skin:     General: Skin is warm and dry.   Neurological:      General: No focal deficit present.      Mental Status: He is alert.      Cranial Nerves: No cranial nerve deficit.   Psychiatric:         Mood and " Affect: Mood normal.         Behavior: Behavior normal.       Patient's Body mass index is 39.19 kg/m². indicating that he is obese (BMI >30). Obesity-related health conditions include the following: none. Obesity is unchanged. BMI is is above average; BMI management plan is completed. We discussed portion control and increasing exercise..      Results Reviewed:  Glucose   Date Value Ref Range Status   02/18/2020 79 74 - 109 mg/dL Final     BUN   Date Value Ref Range Status   04/09/2021 16 6 - 24 mg/dL Final   02/18/2020 20 6 - 20 mg/dL Final     Creatinine   Date Value Ref Range Status   04/09/2021 0.92 0.76 - 1.27 mg/dL Final   02/18/2020 0.7 0.5 - 1.2 mg/dL Final     Sodium   Date Value Ref Range Status   04/09/2021 137 134 - 144 mmol/L Final   02/18/2020 140 136 - 145 mmol/L Final     Potassium   Date Value Ref Range Status   04/09/2021 4.7 3.5 - 5.2 mmol/L Final   02/18/2020 4.4 3.5 - 5.0 mmol/L Final     Chloride   Date Value Ref Range Status   04/09/2021 98 96 - 106 mmol/L Final   02/18/2020 102 98 - 111 mmol/L Final     CO2   Date Value Ref Range Status   02/18/2020 24 22 - 29 mmol/L Final     Total CO2   Date Value Ref Range Status   04/09/2021 25 20 - 29 mmol/L Final     Calcium   Date Value Ref Range Status   04/09/2021 9.3 8.7 - 10.2 mg/dL Final   02/18/2020 9.4 8.6 - 10.0 mg/dL Final     ALT (SGPT)   Date Value Ref Range Status   04/09/2021 12 0 - 44 IU/L Final   02/18/2020 8 5 - 41 U/L Final     AST (SGOT)   Date Value Ref Range Status   04/09/2021 14 0 - 40 IU/L Final   02/18/2020 12 5 - 40 U/L Final     WBC   Date Value Ref Range Status   04/09/2021 7.9 3.4 - 10.8 x10E3/uL Final   02/18/2020 7.5 4.8 - 10.8 K/uL Final     Hematocrit   Date Value Ref Range Status   04/09/2021 39.8 37.5 - 51.0 % Final   02/18/2020 39.5 (L) 42.0 - 52.0 % Final     Platelets   Date Value Ref Range Status   04/09/2021 288 150 - 450 x10E3/uL Final   02/18/2020 281 130 - 400 K/uL Final     Triglycerides   Date Value Ref  Range Status   04/09/2021 102 0 - 149 mg/dL Final   02/18/2020 98 0 - 149 mg/dL Final     HDL Cholesterol   Date Value Ref Range Status   04/09/2021 47 >39 mg/dL Final   02/18/2020 44 (L) 55 - 121 mg/dL Final     Comment:     VALUES>60 MG/DL ARE ASSOCIATED WITH A DECREASED RISK OF  ATHEROSCLEROTIC CARDIOVASCULAR DISEASE     LDL Cholesterol    Date Value Ref Range Status   02/18/2020 77 <100 mg/dL Final     Comment:     <100 MG/DL=OPITIMAL    100-129 MG/DL=DESIRABLE    130-159 MG/DL BORDERLINE=INCREASED RISK OF ATHEROSCLEROTIC  CARDIOVASCULAR DISEASE    > OR = 160 MG/DL=ASSOCIATED WITH AN INCREASE RISK OF  ATHEROSCLEROTIC CARDIOVASCULAR DISEASE     LDL Chol Calc (NIH)   Date Value Ref Range Status   04/09/2021 72 0 - 99 mg/dL Final         Assessment / Plan     Assessment/Plan:  1. Pharyngitis, unspecified etiology  - levoFLOXacin (Levaquin) 750 MG tablet; Take 1 tablet by mouth Daily.  Dispense: 5 tablet; Refill: 0  - predniSONE (DELTASONE) 20 MG tablet; Take 1 tablet by mouth Daily.  Dispense: 5 tablet; Refill: 0    2. Insomnia, unspecified type  - traZODone (DESYREL) 300 MG tablet; Take 1 tablet by mouth Every Night.  Dispense: 30 tablet; Refill: 0        Return for Next scheduled follow up. unless patient needs to be seen sooner or acute issues arise.    Code Status: Full    I have discussed the patient results/orders and and plan/recommendation with them at today's visit.      Nella Flores,    05/18/2021

## 2021-06-10 ENCOUNTER — OFFICE VISIT (OUTPATIENT)
Dept: INTERNAL MEDICINE | Facility: CLINIC | Age: 56
End: 2021-06-10

## 2021-06-10 VITALS
BODY MASS INDEX: 39.06 KG/M2 | TEMPERATURE: 98.4 F | HEIGHT: 71 IN | SYSTOLIC BLOOD PRESSURE: 128 MMHG | HEART RATE: 79 BPM | DIASTOLIC BLOOD PRESSURE: 80 MMHG | OXYGEN SATURATION: 96 % | WEIGHT: 279 LBS

## 2021-06-10 DIAGNOSIS — K92.1 BLOOD IN STOOL: Primary | ICD-10-CM

## 2021-06-10 PROCEDURE — 99213 OFFICE O/P EST LOW 20 MIN: CPT | Performed by: INTERNAL MEDICINE

## 2021-06-10 NOTE — PROGRESS NOTES
Subjective     Chief Complaint   Patient presents with   • Results     wants to discuss what blood work he had done and ask some questions       History of Present Illness  Reviewed blood work and gave him a copy of them.     Blood in stool. Started after taking an energy supplement from the liqueur store. Constipation. No abdominal pain or vomiting.     Patient's PMR from outside medical facility reviewed and noted.    Review of Systems   Constitutional: Positive for fatigue. Negative for chills and fever.   HENT: Negative for congestion and rhinorrhea.    Respiratory: Negative for cough and shortness of breath.    Gastrointestinal: Positive for blood in stool and constipation.   Genitourinary: Negative for dysuria and hematuria.      Otherwise complete ROS reviewed and negative except as mentioned in the HPI.    Past Medical History:   Past Medical History:   Diagnosis Date   • Anxiety    • Depression    • Elevated cholesterol    • Hypertension      Past Surgical History:  Past Surgical History:   Procedure Laterality Date   • WOUND CLOSURE Right 5/28/2019    Procedure: Complex closure of open scalp wound avulsion defect 7x 4.5 cm with rotation/ advancement flap closure 9cm x 5 cm;  Surgeon: Fam Cardoso MD;  Location: [facility];  Service: ENT     Social History:  reports that he has been smoking cigarettes. He has a 19.00 pack-year smoking history. He has never used smokeless tobacco. He reports previous alcohol use. He reports that he does not use drugs.    Family History: family history includes Diabetes in his mother; Hyperlipidemia in his mother; Hypertension in his father and mother; Stroke in his maternal grandfather.      Allergies:  No Known Allergies  Medications:  Prior to Admission medications    Medication Sig Start Date End Date Taking? Authorizing Provider   albuterol sulfate  (90 Base) MCG/ACT inhaler Inhale 2 puffs Every 4 (Four) Hours As Needed for Wheezing. 5/6/21  Yes  Nella Flores DO   atomoxetine (Strattera) 60 MG capsule Take 1 capsule by mouth Daily. 4/8/21  Yes Nella Flores DO   buprenorphine-naloxone (SUBOXONE) 8-2 MG per SL tablet Place 1 tablet under the tongue Daily.   Yes Janie Bueno MD   buPROPion XL (Wellbutrin XL) 150 MG 24 hr tablet Take 1 tablet by mouth Daily. 5/6/21  Yes Nella Flores DO   clotrimazole-betamethasone (Lotrisone) 1-0.05 % cream Apply  topically to the appropriate area as directed 2 (Two) Times a Day. 2/8/21  Yes Nella Flores DO   fluticasone (FLONASE) 50 MCG/ACT nasal spray 2 sprays into the nostril(s) as directed by provider Daily. 2/15/21  Yes Nella Flores DO   gabapentin (Neurontin) 800 MG tablet Take 1 tablet by mouth 4 (Four) Times a Day. 5/6/21  Yes Nella Flores DO   ibuprofen (ADVIL,MOTRIN) 800 MG tablet Take 1 tablet by mouth 2 (Two) Times a Day. 12/30/20  Yes Nella Flores DO   lisinopril (PRINIVIL,ZESTRIL) 40 MG tablet Take 1 tablet by mouth Daily. 3/18/21  Yes Nella Flores DO   Loratadine (CLARITIN PO) Take 10 mg by mouth Daily.   Yes Janie Bueno MD   mupirocin (Bactroban) 2 % ointment Apply  topically to the appropriate area as directed 3 (Three) Times a Day. 5/6/21  Yes Nella Flores DO   nicotine (NICODERM CQ) 21 MG/24HR patch Place 21 patches on the skin as directed by provider Daily. 1/7/20  Yes Janie Bueno MD   Omega-3 Fatty Acids (FISH OIL) 1000 MG capsule capsule Take 1,000 mg by mouth Daily With Breakfast.   Yes Janie Bueno MD   predniSONE (DELTASONE) 20 MG tablet Take 1 tablet by mouth Daily. 5/18/21  Yes Nella Flores DO   pseudoephedrine (Sudafed) 30 MG tablet Take 1 tablet by mouth Every 6 (Six) Hours As Needed for Congestion. 2/18/21  Yes Nella Flores, DO   tamsulosin (Flomax) 0.4 MG capsule 24 hr capsule Take 1 capsule by mouth Daily. 2/8/21  Yes Nella Flores, DO   Testosterone Cypionate (Depo-Testosterone)  "200 MG/ML injection Inject 1 mL into the appropriate muscle as directed by prescriber Every 14 (Fourteen) Days. 4/8/21  Yes Nella Flores DO   traZODone (DESYREL) 300 MG tablet Take 1 tablet by mouth Every Night. 5/18/21  Yes Nella Flores DO   DULoxetine (CYMBALTA) 60 MG capsule Take 1 capsule by mouth Daily for 30 days. 4/29/21 5/29/21  Nella Flores DO   azithromycin (Zithromax Z-Asa) 250 MG tablet Take 2 tablets by mouth on day 1, then 1 tablet daily on days 2-5 5/6/21 6/10/21  Nella Flores DO   Bacitracin-Polymyxin B ([employer] POLY BACITRACIN) [id number] UNIT/GM ointment APPLY TOPICALLY TO THE APPROPRIATE AREA AS DIRECTED 2 (TWO) TIMES A DAY FOR 7 DAYS. 6/25/19 6/10/21  Fam Cardoso MD   levoFLOXacin (Levaquin) 750 MG tablet Take 1 tablet by mouth Daily. 5/18/21 6/10/21  Nella Flores DO   methylPREDNISolone (MEDROL) 4 MG dose pack Take as directed on package instructions. 5/6/21 6/10/21  Nella Flores DO       Objective     Vital Signs: /80 (BP Location: Left arm, Patient Position: Sitting, Cuff Size: Adult)   Pulse 79   Temp 98.4 °F (36.9 °C) (Infrared)   Ht 180.3 cm (71\")   Wt 127 kg (279 lb)   SpO2 96%   BMI 38.91 kg/m²   Physical Exam  Vitals reviewed.   Constitutional:       Appearance: He is obese.   HENT:      Head: Normocephalic and atraumatic.      Nose: Nose normal.   Eyes:      General: No scleral icterus.     Conjunctiva/sclera: Conjunctivae normal.   Cardiovascular:      Rate and Rhythm: Normal rate and regular rhythm.      Heart sounds: Normal heart sounds.   Pulmonary:      Effort: Pulmonary effort is normal.      Breath sounds: Normal breath sounds.   Musculoskeletal:         General: No tenderness.      Cervical back: Normal range of motion and neck supple.   Skin:     General: Skin is warm and dry.      Comments: Chronic right head scar   Neurological:      General: No focal deficit present.      Mental Status: He is alert.      Cranial " Nerves: No cranial nerve deficit.   Psychiatric:         Mood and Affect: Mood normal.         Behavior: Behavior normal.         Patient's Body mass index is 38.91 kg/m². indicating that he is obese (BMI >30). Obesity-related health conditions include the following: hypertension. Obesity is unchanged. BMI is is above average; BMI management plan is completed. We discussed portion control and increasing exercise..      Results Reviewed:  Glucose   Date Value Ref Range Status   02/18/2020 79 74 - 109 mg/dL Final     BUN   Date Value Ref Range Status   04/09/2021 16 6 - 24 mg/dL Final   02/18/2020 20 6 - 20 mg/dL Final     Creatinine   Date Value Ref Range Status   04/09/2021 0.92 0.76 - 1.27 mg/dL Final   02/18/2020 0.7 0.5 - 1.2 mg/dL Final     Sodium   Date Value Ref Range Status   04/09/2021 137 134 - 144 mmol/L Final   02/18/2020 140 136 - 145 mmol/L Final     Potassium   Date Value Ref Range Status   04/09/2021 4.7 3.5 - 5.2 mmol/L Final   02/18/2020 4.4 3.5 - 5.0 mmol/L Final     Chloride   Date Value Ref Range Status   04/09/2021 98 96 - 106 mmol/L Final   02/18/2020 102 98 - 111 mmol/L Final     CO2   Date Value Ref Range Status   02/18/2020 24 22 - 29 mmol/L Final     Total CO2   Date Value Ref Range Status   04/09/2021 25 20 - 29 mmol/L Final     Calcium   Date Value Ref Range Status   04/09/2021 9.3 8.7 - 10.2 mg/dL Final   02/18/2020 9.4 8.6 - 10.0 mg/dL Final     ALT (SGPT)   Date Value Ref Range Status   04/09/2021 12 0 - 44 IU/L Final   02/18/2020 8 5 - 41 U/L Final     AST (SGOT)   Date Value Ref Range Status   04/09/2021 14 0 - 40 IU/L Final   02/18/2020 12 5 - 40 U/L Final     WBC   Date Value Ref Range Status   04/09/2021 7.9 3.4 - 10.8 x10E3/uL Final   02/18/2020 7.5 4.8 - 10.8 K/uL Final     Hematocrit   Date Value Ref Range Status   04/09/2021 39.8 37.5 - 51.0 % Final   02/18/2020 39.5 (L) 42.0 - 52.0 % Final     Platelets   Date Value Ref Range Status   04/09/2021 288 150 - 450 x10E3/uL Final      02/18/2020 281 130 - 400 K/uL Final     Triglycerides   Date Value Ref Range Status   04/09/2021 102 0 - 149 mg/dL Final   02/18/2020 98 0 - 149 mg/dL Final     HDL Cholesterol   Date Value Ref Range Status   04/09/2021 47 >39 mg/dL Final   02/18/2020 44 (L) 55 - 121 mg/dL Final     Comment:     VALUES>60 MG/DL ARE ASSOCIATED WITH A DECREASED RISK OF  ATHEROSCLEROTIC CARDIOVASCULAR DISEASE     LDL Cholesterol    Date Value Ref Range Status   02/18/2020 77 <100 mg/dL Final     Comment:     <100 MG/DL=OPITIMAL    100-129 MG/DL=DESIRABLE    130-159 MG/DL BORDERLINE=INCREASED RISK OF ATHEROSCLEROTIC  CARDIOVASCULAR DISEASE    > OR = 160 MG/DL=ASSOCIATED WITH AN INCREASE RISK OF  ATHEROSCLEROTIC CARDIOVASCULAR DISEASE     LDL Chol Calc (NIH)   Date Value Ref Range Status   04/09/2021 72 0 - 99 mg/dL Final         Assessment / Plan     Assessment/Plan:  1. Blood in stool  - Ambulatory Referral to Gastroenterology    2. Hypertension  - Discussed all labs and gave patient a copy.       Return if symptoms worsen or fail to improve, for Recheck. unless patient needs to be seen sooner or acute issues arise.    Code Status: Full    I have discussed the patient results/orders and and plan/recommendation with them at today's visit.      Nella Flores, DO   06/10/2021

## 2021-06-11 DIAGNOSIS — G89.29 CHRONIC BILATERAL LOW BACK PAIN WITH LEFT-SIDED SCIATICA: ICD-10-CM

## 2021-06-11 DIAGNOSIS — M54.42 CHRONIC BILATERAL LOW BACK PAIN WITH LEFT-SIDED SCIATICA: ICD-10-CM

## 2021-06-11 NOTE — TELEPHONE ENCOUNTER
Caller: Raimundo Ramos    Relationship: Self    Best call back number: [phone]    Medication needed:   Requested Prescriptions     Pending Prescriptions Disp Refills   • gabapentin (Neurontin) 800 MG tablet 120 tablet 0     Sig: Take 1 tablet by mouth 4 (Four) Times a Day.       When do you need the refill by: ASAP    What additional details did the patient provide when requesting the medication:  MEDICATION REFILL  Does the patient have less than a 3 day supply:  [x] Yes  [] No    What is the patient's preferred pharmacy: [facility]/PHARMACY #[id number] - [place] - [address] [phone] [facility] [phone]

## 2021-06-14 ENCOUNTER — CLINICAL SUPPORT (OUTPATIENT)
Dept: INTERNAL MEDICINE | Facility: CLINIC | Age: 56
End: 2021-06-14

## 2021-06-14 DIAGNOSIS — Z79.899 LONG TERM USE OF DRUG: Primary | ICD-10-CM

## 2021-06-14 DIAGNOSIS — G89.29 CHRONIC BILATERAL LOW BACK PAIN WITH LEFT-SIDED SCIATICA: ICD-10-CM

## 2021-06-14 DIAGNOSIS — M54.42 CHRONIC BILATERAL LOW BACK PAIN WITH LEFT-SIDED SCIATICA: ICD-10-CM

## 2021-06-14 DIAGNOSIS — Z71.6 ENCOUNTER FOR TOBACCO USE CESSATION COUNSELING: ICD-10-CM

## 2021-06-14 RX ORDER — BUPROPION HYDROCHLORIDE 150 MG/1
150 TABLET ORAL DAILY
Qty: 30 TABLET | Refills: 1 | Status: SHIPPED | OUTPATIENT
Start: 2021-06-14 | End: 2021-08-24 | Stop reason: SDUPTHER

## 2021-06-14 RX ORDER — GABAPENTIN 800 MG/1
800 TABLET ORAL 4 TIMES DAILY
Qty: 120 TABLET | Refills: 0 | Status: SHIPPED | OUTPATIENT
Start: 2021-06-14 | End: 2021-07-13 | Stop reason: SDUPTHER

## 2021-06-14 RX ORDER — GABAPENTIN 800 MG/1
800 TABLET ORAL 4 TIMES DAILY
Qty: 120 TABLET | Refills: 0 | Status: CANCELLED | OUTPATIENT
Start: 2021-06-14

## 2021-06-14 NOTE — TELEPHONE ENCOUNTER
Caller: Raimundo Ramos    Relationship: Self    Best call back number:[phone]    Medication needed:   Requested Prescriptions     Pending Prescriptions Disp Refills   • gabapentin (Neurontin) 800 MG tablet 120 tablet 0     Sig: Take 1 tablet by mouth 4 (Four) Times a Day.       When do you need the refill by: TODAY    Does the patient have less than a 3 day supply:  [x] Yes  [] No    What is the patient's preferred pharmacy: [facility]/PHARMACY #[id number] - [place] - [address] [facility] [phone] [facility] [phone]

## 2021-06-16 DIAGNOSIS — J40 BRONCHITIS: ICD-10-CM

## 2021-06-16 NOTE — TELEPHONE ENCOUNTER
Caller: Raimundo Ramos    Relationship: Self    Best call back number: [phone]    Medication needed:   Requested Prescriptions     Pending Prescriptions Disp Refills   • albuterol sulfate  (90 Base) MCG/ACT inhaler 18 g 1     Sig: Inhale 2 puffs Every 4 (Four) Hours As Needed for Wheezing.       When do you need the refill by: ASAP    What additional details did the patient provide when requesting the medication: MED REFILL     Does the patient have less than a 3 day supply:  [x] Yes  [] No    What is the patient's preferred pharmacy: [facility]/PHARMACY #[id number] - [place], [place] - [address] [place] [address] [phone] [facility] [phone]

## 2021-06-17 RX ORDER — ALBUTEROL SULFATE 90 UG/1
2 AEROSOL, METERED RESPIRATORY (INHALATION) EVERY 4 HOURS PRN
Qty: 18 G | Refills: 1 | Status: SHIPPED | OUTPATIENT
Start: 2021-06-17

## 2021-06-19 LAB — DRUGS UR: NORMAL

## 2021-06-21 DIAGNOSIS — M19.90 ARTHRITIS: ICD-10-CM

## 2021-06-21 RX ORDER — IBUPROFEN 800 MG/1
800 TABLET ORAL 2 TIMES DAILY
Qty: 60 TABLET | Refills: 5 | Status: SHIPPED | OUTPATIENT
Start: 2021-06-21 | End: 2021-12-02

## 2021-06-22 ENCOUNTER — OFFICE VISIT (OUTPATIENT)
Dept: GASTROENTEROLOGY | Facility: CLINIC | Age: 56
End: 2021-06-22

## 2021-06-22 VITALS
OXYGEN SATURATION: 98 % | DIASTOLIC BLOOD PRESSURE: 78 MMHG | BODY MASS INDEX: 38.64 KG/M2 | WEIGHT: 276 LBS | HEIGHT: 71 IN | SYSTOLIC BLOOD PRESSURE: 140 MMHG | TEMPERATURE: 97 F | HEART RATE: 97 BPM

## 2021-06-22 DIAGNOSIS — K62.5 BRBPR (BRIGHT RED BLOOD PER RECTUM): ICD-10-CM

## 2021-06-22 DIAGNOSIS — K59.00 CONSTIPATION, UNSPECIFIED CONSTIPATION TYPE: Primary | ICD-10-CM

## 2021-06-22 DIAGNOSIS — R19.4 CHANGE IN BOWEL HABITS: ICD-10-CM

## 2021-06-22 DIAGNOSIS — E66.9 OBESITY, UNSPECIFIED OBESITY SEVERITY, UNSPECIFIED OBESITY TYPE: ICD-10-CM

## 2021-06-22 DIAGNOSIS — Z71.6 TOBACCO ABUSE COUNSELING: ICD-10-CM

## 2021-06-22 DIAGNOSIS — D62 ANEMIA DUE TO ACUTE BLOOD LOSS: ICD-10-CM

## 2021-06-22 PROCEDURE — 99214 OFFICE O/P EST MOD 30 MIN: CPT | Performed by: CLINICAL NURSE SPECIALIST

## 2021-06-22 RX ORDER — TRAZODONE HYDROCHLORIDE 300 MG/1
300 TABLET ORAL NIGHTLY
COMMUNITY
End: 2022-04-20

## 2021-06-22 NOTE — PROGRESS NOTES
Raimundo Ramos  1965 6/22/2021  Chief Complaint   Patient presents with   • GI Problem     New patient ref by Dr. Flores for rectal bleeding     Subjective   HPI  Raimundo Ramos is a 55 y.o. male who presents with a complaint of constipation for a few weeks, new for him he normally is regular and goes normally. He is going daily but they are hard balls. He says that over the past few days he has had BRBPR.  He says he didn't go for approx 2 days then he went for 2 days straight. No certain triggers. No alleviating factors. He is not taking anything for this. He is on Suboxone and takes ibuprofen in the am and again at night. He is also on Gabapentin.   He abdominal pain. No upper GI complaints. No nausea or vomiting. No wt loss.   He has had labs 2 months ago hgb is 12.7/39.8.   He has taken Kratom recently prior to these symptoms and has stopped due to the bleeding and concern.     Past Medical History:   Diagnosis Date   • Anxiety    • Back pain    • Chronic pain    • Depression    • Elevated cholesterol    • Hypertension      Past Surgical History:   Procedure Laterality Date   • WOUND CLOSURE Right 5/28/2019    Procedure: Complex closure of open scalp wound avulsion defect 7x 4.5 cm with rotation/ advancement flap closure 9cm x 5 cm;  Surgeon: Fam Cardoso MD;  Location: [facility] OR;  Service: ENT       Outpatient Medications Marked as Taking for the 6/22/21 encounter (Office Visit) with Sonia Guerrero APRN   Medication Sig Dispense Refill   • albuterol sulfate  (90 Base) MCG/ACT inhaler Inhale 2 puffs Every 4 (Four) Hours As Needed for Wheezing. 18 g 1   • atomoxetine (Strattera) 60 MG capsule Take 1 capsule by mouth Daily. 30 capsule 1   • buprenorphine-naloxone (SUBOXONE) 8-2 MG per SL tablet Place 1 tablet under the tongue Daily.     • buPROPion XL (Wellbutrin XL) 150 MG 24 hr tablet Take 1 tablet by mouth Daily. 30 tablet 1   • clotrimazole-betamethasone (Lotrisone) 1-0.05 % cream  Apply  topically to the appropriate area as directed 2 (Two) Times a Day. 45 g 5   • fluticasone (FLONASE) 50 MCG/ACT nasal spray 2 sprays into the nostril(s) as directed by provider Daily. 15.8 mL 3   • gabapentin (Neurontin) 800 MG tablet Take 1 tablet by mouth 4 (Four) Times a Day. 120 tablet 0   • ibuprofen (ADVIL,MOTRIN) 800 MG tablet Take 1 tablet by mouth 2 (Two) Times a Day. 60 tablet 5   • lisinopril (PRINIVIL,ZESTRIL) 40 MG tablet Take 1 tablet by mouth Daily. 30 tablet 5   • Loratadine (CLARITIN PO) Take 10 mg by mouth Daily.     • mupirocin (Bactroban) 2 % ointment Apply  topically to the appropriate area as directed 3 (Three) Times a Day. 1 g 1   • Omega-3 Fatty Acids (FISH OIL) 1000 MG capsule capsule Take 1,000 mg by mouth Daily With Breakfast.     • pseudoephedrine (Sudafed) 30 MG tablet Take 1 tablet by mouth Every 6 (Six) Hours As Needed for Congestion. 20 tablet 0   • tamsulosin (Flomax) 0.4 MG capsule 24 hr capsule Take 1 capsule by mouth Daily. 30 capsule 5   • traZODone (DESYREL) 300 MG tablet Take 300 mg by mouth Every Night.       No Known Allergies  Social History     Socioeconomic History   • Marital status:      Spouse name: Not on file   • Number of children: Not on file   • Years of education: Not on file   • Highest education level: Not on file   Tobacco Use   • Smoking status: Current Every Day Smoker     Packs/day: 0.50     Years: 38.00     Pack years: 19.00     Types: Cigarettes   • Smokeless tobacco: Never Used   Substance and Sexual Activity   • Alcohol use: Not Currently     Comment: Quit 2017   • Drug use: No   • Sexual activity: Defer     Family History   Problem Relation Age of Onset   • Diabetes Mother    • Hypertension Mother    • Hyperlipidemia Mother    • Hypertension Father    • Stroke Maternal Grandfather    • Colon cancer Neg Hx    • Colon polyps Neg Hx    • Esophageal cancer Neg Hx    • Liver cancer Neg Hx    • Liver disease Neg Hx    • Rectal cancer Neg Hx    •  "Stomach cancer Neg Hx      Health Maintenance   Topic Date Due   • COLORECTAL CANCER SCREENING  Never done   • ANNUAL PHYSICAL  Never done   • Pneumococcal Vaccine 0-64 (1 of 1 - PPSV23) Never done   • COVID-19 Vaccine (1) Never done   • HEPATITIS C SCREENING  Never done   • ZOSTER VACCINE (1 of 2) 10/29/2021 (Originally 8/17/2016)   • INFLUENZA VACCINE  08/01/2021   • LIPID PANEL  04/09/2022   • TDAP/TD VACCINES (4 - Td or Tdap) 05/27/2029     Review of Systems   Constitutional: Negative for activity change, appetite change, chills, diaphoresis, fatigue, fever and unexpected weight change.   HENT: Negative for ear pain, hearing loss, mouth sores, sore throat, trouble swallowing and voice change.    Eyes: Negative.    Respiratory: Negative for cough, choking, shortness of breath and wheezing.    Cardiovascular: Negative for chest pain and palpitations.   Gastrointestinal: Positive for blood in stool and constipation. Negative for abdominal pain, diarrhea, nausea and vomiting.   Endocrine: Negative for cold intolerance and heat intolerance.   Genitourinary: Negative for decreased urine volume, dysuria, frequency, hematuria and urgency.   Musculoskeletal: Negative for back pain, gait problem and myalgias.   Skin: Negative for color change, pallor and rash.   Allergic/Immunologic: Negative for food allergies and immunocompromised state.   Neurological: Negative for dizziness, tremors, seizures, syncope, weakness, light-headedness, numbness and headaches.   Hematological: Negative for adenopathy. Does not bruise/bleed easily.   Psychiatric/Behavioral: Negative for agitation and confusion. The patient is not nervous/anxious.    All other systems reviewed and are negative.    Objective   Vitals:    06/22/21 1027   BP: 140/78   Pulse: 97   Temp: 97 °F (36.1 °C)   SpO2: 98%   Weight: 125 kg (276 lb)   Height: 180.3 cm (71\")     Body mass index is 38.49 kg/m².  Physical Exam  Constitutional:       Appearance: He is " well-developed.   HENT:      Head: Normocephalic and atraumatic.   Eyes:      Pupils: Pupils are equal, round, and reactive to light.   Neck:      Trachea: No tracheal deviation.   Cardiovascular:      Rate and Rhythm: Normal rate and regular rhythm.      Heart sounds: Normal heart sounds. No murmur heard.   No friction rub. No gallop.    Pulmonary:      Effort: Pulmonary effort is normal. No respiratory distress.      Breath sounds: Normal breath sounds. No wheezing or rales.   Chest:      Chest wall: No tenderness.   Abdominal:      General: Bowel sounds are normal. There is no distension.      Palpations: Abdomen is soft. Abdomen is not rigid.      Tenderness: There is no abdominal tenderness. There is no guarding or rebound.   Musculoskeletal:         General: No tenderness or deformity. Normal range of motion.      Cervical back: Normal range of motion and neck supple.   Skin:     General: Skin is warm and dry.      Coloration: Skin is not pale.      Findings: No rash.   Neurological:      Mental Status: He is alert and oriented to person, place, and time.      Deep Tendon Reflexes: Reflexes are normal and symmetric.   Psychiatric:         Behavior: Behavior normal.         Thought Content: Thought content normal.         Judgment: Judgment normal.       Assessment/Plan   Diagnoses and all orders for this visit:    1. Constipation, unspecified constipation type (Primary)  -     polyethylene glycol (GoLYTELY) 236 g solution; Take as directed by office instructions.  Dispense: 4000 mL; Refill: 0    2. Change in bowel habits  -     Case Request; Standing  -     Follow Anesthesia Guidelines / Standing Orders; Future  -     Obtain Informed Consent; Future  -     Implement Anesthesia Orders Day of Procedure; Standing  -     Obtain Informed Consent; Standing  -     Verify bowel prep was successful; Standing  -     Case Request    3. Obesity, unspecified obesity severity, unspecified obesity type    4. Tobacco abuse  counseling    5. BRBPR (bright red blood per rectum)    6. Anemia due to acute blood loss    Miralax recommended for constipation. Increase water intake.   Will get colonoscopy to further review.   Suspect constipation could be due to medications.     COLONOSCOPY WITH ANESTHESIA (N/A)  Part of this note may be an electronic transcription/translation of spoken language to printed text using the Dragon Dictation System.  Body mass index is 38.49 kg/m².  No follow-ups on file.    Patient's Body mass index is 38.49 kg/m². indicating that he is morbidly obese (BMI > 40 or > 35 with obesity - related health condition). Obesity-related health conditions include the following: hypertension. Obesity is unchanged. BMI is is above average; BMI management plan is completed. We discussed portion control and increasing exercise..      All risks, benefits, alternatives, and indications of colonoscopy and/or Endoscopy procedure have been discussed with the patient. Risks to include perforation of the colon requiring possible surgery or colostomy, risk of bleeding from biopsies or removal of colon tissue, possibility of missing a colon polyp or cancer, or adverse drug reaction.  Benefits to include the diagnosis and management of disease of the colon and rectum. Alternatives to include barium enema, radiographic evaluation, lab testing or no intervention. Pt verbalizes understanding and agrees.     Sonia Guerrero, APRN  6/22/2021  10:58 CDT          If you smoke or use tobacco, 4 minutes reading provided  Steps to Quit Smoking  Smoking tobacco can be harmful to your health and can affect almost every organ in your body. Smoking puts you, and those around you, at risk for developing many serious chronic diseases. Quitting smoking is difficult, but it is one of the best things that you can do for your health. It is never too late to quit.  What are the benefits of quitting smoking?  When you quit smoking, you lower your risk of  developing serious diseases and conditions, such as:  · Lung cancer or lung disease, such as COPD.  · Heart disease.  · Stroke.  · Heart attack.  · Infertility.  · Osteoporosis and bone fractures.  Additionally, symptoms such as coughing, wheezing, and shortness of breath may get better when you quit. You may also find that you get sick less often because your body is stronger at fighting off colds and infections. If you are pregnant, quitting smoking can help to reduce your chances of having a baby of low birth weight.  How do I get ready to quit?  When you decide to quit smoking, create a plan to make sure that you are successful. Before you quit:  · Pick a date to quit. Set a date within the next two weeks to give you time to prepare.  · Write down the reasons why you are quitting. Keep this list in places where you will see it often, such as on your bathroom mirror or in your car or wallet.  · Identify the people, places, things, and activities that make you want to smoke (triggers) and avoid them. Make sure to take these actions:  ¨ Throw away all cigarettes at home, at work, and in your car.  ¨ Throw away smoking accessories, such as ashtrays and lighters.  ¨ Clean your car and make sure to empty the ashtray.  ¨ Clean your home, including curtains and carpets.  · Tell your family, friends, and coworkers that you are quitting. Support from your loved ones can make quitting easier.  · Talk with your health care provider about your options for quitting smoking.  · Find out what treatment options are covered by your health insurance.  What strategies can I use to quit smoking?  Talk with your healthcare provider about different strategies to quit smoking. Some strategies include:  · Quitting smoking altogether instead of gradually lessening how much you smoke over a period of time. Research shows that quitting “cold turkey” is more successful than gradually quitting.  · Attending in-person counseling to help you  build problem-solving skills. You are more likely to have success in quitting if you attend several counseling sessions. Even short sessions of 10 minutes can be effective.  · Finding resources and support systems that can help you to quit smoking and remain smoke-free after you quit. These resources are most helpful when you use them often. They can include:  ¨ Online chats with a counselor.  ¨ Telephone quitlines.  ¨ Printed self-help materials.  ¨ Support groups or group counseling.  ¨ Text messaging programs.  ¨ Mobile phone applications.  · Taking medicines to help you quit smoking. (If you are pregnant or breastfeeding, talk with your health care provider first.) Some medicines contain nicotine and some do not. Both types of medicines help with cravings, but the medicines that include nicotine help to relieve withdrawal symptoms. Your health care provider may recommend:  ¨ Nicotine patches, gum, or lozenges.  ¨ Nicotine inhalers or sprays.  ¨ Non-nicotine medicine that is taken by mouth.  Talk with your health care provider about combining strategies, such as taking medicines while you are also receiving in-person counseling. Using these two strategies together makes you more likely to succeed in quitting than if you used either strategy on its own.  If you are pregnant or breastfeeding, talk with your health care provider about finding counseling or other support strategies to quit smoking. Do not take medicine to help you quit smoking unless told to do so by your health care provider.  What things can I do to make it easier to quit?  Quitting smoking might feel overwhelming at first, but there is a lot that you can do to make it easier. Take these important actions:  · Reach out to your family and friends and ask that they support and encourage you during this time. Call telephone quitlines, reach out to support groups, or work with a counselor for support.  · Ask people who smoke to avoid smoking around  you.  · Avoid places that trigger you to smoke, such as bars, parties, or smoke-break areas at work.  · Spend time around people who do not smoke.  · Lessen stress in your life, because stress can be a smoking trigger for some people. To lessen stress, try:  ¨ Exercising regularly.  ¨ Deep-breathing exercises.  ¨ Yoga.  ¨ Meditating.  ¨ Performing a body scan. This involves closing your eyes, scanning your body from head to toe, and noticing which parts of your body are particularly tense. Purposefully relax the muscles in those areas.  · Download or purchase mobile phone or tablet apps (applications) that can help you stick to your quit plan by providing reminders, tips, and encouragement. There are many free apps, such as QuitGuide from the CDC (Centers for Disease Control and Prevention). You can find other support for quitting smoking (smoking cessation) through smokefree.gov and other websites.  How will I feel when I quit smoking?  Within the first 24 hours of quitting smoking, you may start to feel some withdrawal symptoms. These symptoms are usually most noticeable 2-3 days after quitting, but they usually do not last beyond 2-3 weeks. Changes or symptoms that you might experience include:  · Mood swings.  · Restlessness, anxiety, or irritation.  · Difficulty concentrating.  · Dizziness.  · Strong cravings for sugary foods in addition to nicotine.  · Mild weight gain.  · Constipation.  · Nausea.  · Coughing or a sore throat.  · Changes in how your medicines work in your body.  · A depressed mood.  · Difficulty sleeping (insomnia).  After the first 2-3 weeks of quitting, you may start to notice more positive results, such as:  · Improved sense of smell and taste.  · Decreased coughing and sore throat.  · Slower heart rate.  · Lower blood pressure.  · Clearer skin.  · The ability to breathe more easily.  · Fewer sick days.  Quitting smoking is very challenging for most people. Do not get discouraged if you are  not successful the first time. Some people need to make many attempts to quit before they achieve long-term success. Do your best to stick to your quit plan, and talk with your health care provider if you have any questions or concerns.  This information is not intended to replace advice given to you by your health care provider. Make sure you discuss any questions you have with your health care provider.  Document Released: 12/12/2002 Document Revised: 08/15/2017 Document Reviewed: 05/03/2016  Elsevier Interactive Patient Education © 2017 [employer]

## 2021-06-23 PROBLEM — R19.4 CHANGE IN BOWEL HABITS: Status: ACTIVE | Noted: 2021-06-23

## 2021-07-13 DIAGNOSIS — M54.42 CHRONIC BILATERAL LOW BACK PAIN WITH LEFT-SIDED SCIATICA: ICD-10-CM

## 2021-07-13 DIAGNOSIS — G89.29 CHRONIC BILATERAL LOW BACK PAIN WITH LEFT-SIDED SCIATICA: ICD-10-CM

## 2021-07-13 RX ORDER — GABAPENTIN 800 MG/1
800 TABLET ORAL 4 TIMES DAILY
Qty: 120 TABLET | Refills: 0 | Status: SHIPPED | OUTPATIENT
Start: 2021-07-13 | End: 2021-08-12 | Stop reason: SDUPTHER

## 2021-07-13 NOTE — TELEPHONE ENCOUNTER
Caller: Raimundo Ramos    Relationship: Self    Best call back number: [phone]    Medication needed:   Requested Prescriptions     Pending Prescriptions Disp Refills   • gabapentin (Neurontin) 800 MG tablet 120 tablet 0     Sig: Take 1 tablet by mouth 4 (Four) Times a Day.       When do you need the refill by: TODAY    Does the patient have less than a 3 day supply:  [x] Yes  [] No    What is the patient's preferred pharmacy: [facility]/PHARMACY #[id number] - [place] - [address] [phone] [facility] [phone]

## 2021-07-29 DIAGNOSIS — R35.0 URINARY FREQUENCY: ICD-10-CM

## 2021-07-29 RX ORDER — TAMSULOSIN HYDROCHLORIDE 0.4 MG/1
1 CAPSULE ORAL DAILY
Qty: 30 CAPSULE | Refills: 5 | Status: SHIPPED | OUTPATIENT
Start: 2021-07-29 | End: 2022-01-27

## 2021-07-30 DIAGNOSIS — I10 ESSENTIAL HYPERTENSION: ICD-10-CM

## 2021-07-30 RX ORDER — AMLODIPINE BESYLATE 5 MG/1
TABLET ORAL
Qty: 30 TABLET | Refills: 11 | OUTPATIENT
Start: 2021-07-30

## 2021-08-11 DIAGNOSIS — F90.9 ADULT ADHD: ICD-10-CM

## 2021-08-11 RX ORDER — ATOMOXETINE 60 MG/1
60 CAPSULE ORAL DAILY
Qty: 30 CAPSULE | Refills: 1 | Status: SHIPPED | OUTPATIENT
Start: 2021-08-11 | End: 2021-12-30

## 2021-08-11 NOTE — TELEPHONE ENCOUNTER
Requested Prescriptions     Pending Prescriptions Disp Refills   • atomoxetine (Strattera) 60 MG capsule 30 capsule 1     Sig: Take 1 capsule by mouth Daily.

## 2021-08-12 DIAGNOSIS — M54.42 CHRONIC BILATERAL LOW BACK PAIN WITH LEFT-SIDED SCIATICA: ICD-10-CM

## 2021-08-12 DIAGNOSIS — G89.29 CHRONIC BILATERAL LOW BACK PAIN WITH LEFT-SIDED SCIATICA: ICD-10-CM

## 2021-08-12 RX ORDER — GABAPENTIN 800 MG/1
800 TABLET ORAL 4 TIMES DAILY
Qty: 120 TABLET | Refills: 0 | Status: SHIPPED | OUTPATIENT
Start: 2021-08-12 | End: 2021-09-13 | Stop reason: SDUPTHER

## 2021-08-12 NOTE — TELEPHONE ENCOUNTER
Caller: Raimundo Ramos    Relationship: Self    Best call back number:[phone] (H)    Medication needed:   entin (Neurontin) 800 MG tablet  800 mg, 4 Times Daily         When do you need the refill by: BY Friday 08/13/21 STATES THE REFILL IS DUE ON THE 14TH     What additional details did the patient provide when requesting the medication: NONE     Does the patient have less than a 3 day supply:  [x] Yes  [] No    What is the patient's preferred pharmacy:    [facility]/pharmacy #[id number] - [place] - [address] [phone] [facility] [phone]   [phone]

## 2021-08-12 NOTE — TELEPHONE ENCOUNTER
Controlled Substance Refill Request:     Medications needing Refilled: Gabapentin    Pharmacy: [facility]     Last Office Visit: 06/10/2021  Next Office Visit: 08/25/2021    UDS:  Last Urine Drug Screen: 06/14/2021  Result:  Compliance Drug Analysis, Ur - Urine, Clean Catch (06/14/2021 11:00)      Next Urine Drug Screen Scheduled: Not scheduled at this time      Controlled Substance Agreement: up to date

## 2021-08-24 DIAGNOSIS — Z71.6 ENCOUNTER FOR TOBACCO USE CESSATION COUNSELING: ICD-10-CM

## 2021-08-24 RX ORDER — BUPROPION HYDROCHLORIDE 150 MG/1
150 TABLET ORAL DAILY
Qty: 30 TABLET | Refills: 1 | Status: SHIPPED | OUTPATIENT
Start: 2021-08-24 | End: 2021-11-01

## 2021-08-24 NOTE — TELEPHONE ENCOUNTER
Requested Prescriptions     Pending Prescriptions Disp Refills   • buPROPion XL (Wellbutrin XL) 150 MG 24 hr tablet 30 tablet 1     Sig: Take 1 tablet by mouth Daily.

## 2021-09-13 ENCOUNTER — OFFICE VISIT (OUTPATIENT)
Dept: INTERNAL MEDICINE | Facility: CLINIC | Age: 56
End: 2021-09-13

## 2021-09-13 VITALS
DIASTOLIC BLOOD PRESSURE: 74 MMHG | SYSTOLIC BLOOD PRESSURE: 150 MMHG | HEART RATE: 80 BPM | WEIGHT: 287 LBS | BODY MASS INDEX: 40.18 KG/M2 | TEMPERATURE: 96.4 F | HEIGHT: 71 IN | OXYGEN SATURATION: 97 %

## 2021-09-13 DIAGNOSIS — R53.83 FATIGUE, UNSPECIFIED TYPE: ICD-10-CM

## 2021-09-13 DIAGNOSIS — Z79.899 LONG TERM USE OF DRUG: Primary | ICD-10-CM

## 2021-09-13 DIAGNOSIS — M54.42 CHRONIC BILATERAL LOW BACK PAIN WITH LEFT-SIDED SCIATICA: ICD-10-CM

## 2021-09-13 DIAGNOSIS — Z12.11 ENCOUNTER FOR SCREENING COLONOSCOPY: ICD-10-CM

## 2021-09-13 DIAGNOSIS — I10 ESSENTIAL HYPERTENSION: ICD-10-CM

## 2021-09-13 DIAGNOSIS — G89.29 CHRONIC BILATERAL LOW BACK PAIN WITH LEFT-SIDED SCIATICA: ICD-10-CM

## 2021-09-13 PROCEDURE — 99213 OFFICE O/P EST LOW 20 MIN: CPT | Performed by: INTERNAL MEDICINE

## 2021-09-13 RX ORDER — AMLODIPINE BESYLATE 5 MG/1
5 TABLET ORAL DAILY
COMMUNITY
Start: 2021-07-04

## 2021-09-13 RX ORDER — GABAPENTIN 800 MG/1
800 TABLET ORAL 4 TIMES DAILY
Qty: 120 TABLET | Refills: 0 | Status: SHIPPED | OUTPATIENT
Start: 2021-09-13 | End: 2021-10-12 | Stop reason: SDUPTHER

## 2021-09-13 NOTE — PROGRESS NOTES
Subjective     Chief Complaint   Patient presents with   • Hypertension     needs medicaiton refilled       History of Present Illness  Patient states that he is fatigue. Says that it is because he is not exercising and has slowed down.     Went to get his colonoscopy and didn't have a  and they would not let him get it done. Has the study rescheduled and forgot about the appointment.     States that he doesn't do well in the heat and is worried about getting a heat stroke.     Patient states that his pain is worse at night. Having difficulty sleeping thought he night due to pain. Asks for increase in frequency and we discuss that this is not appropriate. Discussed taking the night dose later in the evening.     Patient's PMR from outside medical facility reviewed and noted.    Review of Systems   Constitutional: Positive for fatigue. Negative for chills and fever.   HENT: Negative for congestion and rhinorrhea.    Respiratory: Negative for cough and shortness of breath.    Cardiovascular: Negative for chest pain and leg swelling.   Gastrointestinal: Negative for constipation and diarrhea.   Genitourinary: Negative for dysuria and hematuria.      Otherwise complete ROS reviewed and negative except as mentioned in the HPI.    Past Medical History:   Past Medical History:   Diagnosis Date   • Anxiety    • Back pain    • Chronic pain    • Depression    • Elevated cholesterol    • Hypertension      Past Surgical History:  Past Surgical History:   Procedure Laterality Date   • WOUND CLOSURE Right 5/28/2019    Procedure: Complex closure of open scalp wound avulsion defect 7x 4.5 cm with rotation/ advancement flap closure 9cm x 5 cm;  Surgeon: Fam Cardoso MD;  Location: [facility];  Service: ENT     Social History:  reports that he has been smoking cigarettes. He has a 19.00 pack-year smoking history. He has never used smokeless tobacco. He reports previous alcohol use. He reports that he does not  use drugs.    Family History: family history includes Diabetes in his mother; Hyperlipidemia in his mother; Hypertension in his father and mother; Stroke in his maternal grandfather.       Allergies:  No Known Allergies  Medications:  Prior to Admission medications    Medication Sig Start Date End Date Taking? Authorizing Provider   albuterol sulfate  (90 Base) MCG/ACT inhaler Inhale 2 puffs Every 4 (Four) Hours As Needed for Wheezing. 6/17/21  Yes Nella Flores DO   amLODIPine (NORVASC) 5 MG tablet Take 5 mg by mouth Daily. 7/4/21  Yes ProviderJanie MD   atomoxetine (Strattera) 60 MG capsule Take 1 capsule by mouth Daily. 8/11/21  Yes Nella Flores DO   buprenorphine-naloxone (SUBOXONE) 8-2 MG per SL tablet Place 1 tablet under the tongue Daily.   Yes ProviderJanie MD   buPROPion XL (Wellbutrin XL) 150 MG 24 hr tablet Take 1 tablet by mouth Daily. 8/24/21  Yes Nella Flores DO   clotrimazole-betamethasone (Lotrisone) 1-0.05 % cream Apply  topically to the appropriate area as directed 2 (Two) Times a Day. 2/8/21  Yes Nella Flores DO   fluticasone (FLONASE) 50 MCG/ACT nasal spray 2 sprays into the nostril(s) as directed by provider Daily. 2/15/21  Yes Nella Flores DO   gabapentin (Neurontin) 800 MG tablet Take 1 tablet by mouth 4 (Four) Times a Day. 8/12/21  Yes Nella Flores DO   ibuprofen (ADVIL,MOTRIN) 800 MG tablet Take 1 tablet by mouth 2 (Two) Times a Day. 6/21/21  Yes Nella Flores DO   lisinopril (PRINIVIL,ZESTRIL) 40 MG tablet Take 1 tablet by mouth Daily. 3/18/21  Yes Nella Flores DO   Loratadine (CLARITIN PO) Take 10 mg by mouth Daily.   Yes ProviderJanie MD   mupirocin (Bactroban) 2 % ointment Apply  topically to the appropriate area as directed 3 (Three) Times a Day. 5/6/21  Yes Mark, Ali Janeth, DO   Omega-3 Fatty Acids (FISH OIL) 1000 MG capsule capsule Take 1,000 mg by mouth Daily With Breakfast.   Yes Provider,  "MD Janie   polyethylene glycol (GoLYTELY) 236 g solution Take as directed by office instructions. 6/22/21  Yes Sonia Guerrero APRN   tamsulosin (Flomax) 0.4 MG capsule 24 hr capsule Take 1 capsule by mouth Daily. 7/29/21  Yes Nella Flores DO   traZODone (DESYREL) 300 MG tablet Take 300 mg by mouth Every Night.   Yes Provider, MD Janie   DULoxetine (CYMBALTA) 60 MG capsule Take 1 capsule by mouth Daily for 30 days. 4/29/21 5/29/21  Nella Flores DO   pseudoephedrine (Sudafed) 30 MG tablet Take 1 tablet by mouth Every 6 (Six) Hours As Needed for Congestion. 2/18/21 9/13/21  Nella Flores DO       Objective     Vital Signs: /74 (BP Location: Left arm, Patient Position: Sitting, Cuff Size: Large Adult)   Pulse 80   Temp 96.4 °F (35.8 °C) (Infrared)   Ht 180.3 cm (71\")   Wt 130 kg (287 lb)   SpO2 97%   BMI 40.03 kg/m²   Physical Exam  Vitals reviewed.   Constitutional:       Appearance: He is obese.   HENT:      Head: Normocephalic and atraumatic.      Nose: Nose normal.   Eyes:      General: No scleral icterus.     Conjunctiva/sclera: Conjunctivae normal.   Cardiovascular:      Rate and Rhythm: Normal rate and regular rhythm.      Heart sounds: Normal heart sounds.   Pulmonary:      Effort: Pulmonary effort is normal.      Breath sounds: Normal breath sounds.   Musculoskeletal:         General: No swelling or tenderness.      Cervical back: Normal range of motion and neck supple.   Skin:     General: Skin is warm and dry.   Neurological:      General: No focal deficit present.      Mental Status: He is alert.      Cranial Nerves: No cranial nerve deficit.   Psychiatric:         Mood and Affect: Mood normal.         Behavior: Behavior normal.       Patient's Body mass index is 40.03 kg/m². indicating that he is morbidly obese (BMI > 40 or > 35 with obesity - related health condition). Obesity-related health conditions include the following: hypertension. Obesity is " unchanged. BMI is is above average; BMI management plan is completed. We discussed portion control and increasing exercise..      Results Reviewed:  Glucose   Date Value Ref Range Status   02/18/2020 79 74 - 109 mg/dL Final     BUN   Date Value Ref Range Status   04/09/2021 16 6 - 24 mg/dL Final   02/18/2020 20 6 - 20 mg/dL Final     Creatinine   Date Value Ref Range Status   04/09/2021 0.92 0.76 - 1.27 mg/dL Final   02/18/2020 0.7 0.5 - 1.2 mg/dL Final     Sodium   Date Value Ref Range Status   04/09/2021 137 134 - 144 mmol/L Final   02/18/2020 140 136 - 145 mmol/L Final     Potassium   Date Value Ref Range Status   04/09/2021 4.7 3.5 - 5.2 mmol/L Final   02/18/2020 4.4 3.5 - 5.0 mmol/L Final     Chloride   Date Value Ref Range Status   04/09/2021 98 96 - 106 mmol/L Final   02/18/2020 102 98 - 111 mmol/L Final     CO2   Date Value Ref Range Status   02/18/2020 24 22 - 29 mmol/L Final     Total CO2   Date Value Ref Range Status   04/09/2021 25 20 - 29 mmol/L Final     Calcium   Date Value Ref Range Status   04/09/2021 9.3 8.7 - 10.2 mg/dL Final   02/18/2020 9.4 8.6 - 10.0 mg/dL Final     ALT (SGPT)   Date Value Ref Range Status   04/09/2021 12 0 - 44 IU/L Final   02/18/2020 8 5 - 41 U/L Final     AST (SGOT)   Date Value Ref Range Status   04/09/2021 14 0 - 40 IU/L Final   02/18/2020 12 5 - 40 U/L Final     WBC   Date Value Ref Range Status   04/09/2021 7.9 3.4 - 10.8 x10E3/uL Final   02/18/2020 7.5 4.8 - 10.8 K/uL Final     Hematocrit   Date Value Ref Range Status   04/09/2021 39.8 37.5 - 51.0 % Final   02/18/2020 39.5 (L) 42.0 - 52.0 % Final     Platelets   Date Value Ref Range Status   04/09/2021 288 150 - 450 x10E3/uL Final   02/18/2020 281 130 - 400 K/uL Final     Triglycerides   Date Value Ref Range Status   04/09/2021 102 0 - 149 mg/dL Final   02/18/2020 98 0 - 149 mg/dL Final     HDL Cholesterol   Date Value Ref Range Status   04/09/2021 47 >39 mg/dL Final   02/18/2020 44 (L) 55 - 121 mg/dL Final     Comment:      VALUES>60 MG/DL ARE ASSOCIATED WITH A DECREASED RISK OF  ATHEROSCLEROTIC CARDIOVASCULAR DISEASE     LDL Cholesterol    Date Value Ref Range Status   02/18/2020 77 <100 mg/dL Final     Comment:     <100 MG/DL=OPITIMAL    100-129 MG/DL=DESIRABLE    130-159 MG/DL BORDERLINE=INCREASED RISK OF ATHEROSCLEROTIC  CARDIOVASCULAR DISEASE    > OR = 160 MG/DL=ASSOCIATED WITH AN INCREASE RISK OF  ATHEROSCLEROTIC CARDIOVASCULAR DISEASE     LDL Chol Calc (NIH)   Date Value Ref Range Status   04/09/2021 72 0 - 99 mg/dL Final         Assessment / Plan     Assessment/Plan:  1. Long term use of drug  - Compliance Drug Analysis, Ur - Urine, Clean Catch    2. Fatigue, unspecified type  - CBC w AUTO Differential  - Comprehensive metabolic panel    3. Encounter for screening colonoscopy  - Ambulatory Referral to General Surgery    4. Chronic low back pain  - Refilled Neurontin QID #120      Return in about 3 months (around 12/13/2021) for Recheck, Next scheduled follow up. unless patient needs to be seen sooner or acute issues arise.    Code Status: Full    I have discussed the patient results/orders and and plan/recommendation with them at today's visit.      Nella Flores, DO   09/13/2021

## 2021-09-14 LAB
ALBUMIN SERPL-MCNC: 4 G/DL (ref 3.8–4.9)
ALBUMIN/GLOB SERPL: 1.1 {RATIO} (ref 1.2–2.2)
ALP SERPL-CCNC: 101 IU/L (ref 44–121)
ALT SERPL-CCNC: 14 IU/L (ref 0–44)
AST SERPL-CCNC: 13 IU/L (ref 0–40)
BASOPHILS # BLD AUTO: 0.1 X10E3/UL (ref 0–0.2)
BASOPHILS NFR BLD AUTO: 1 %
BILIRUB SERPL-MCNC: 0.2 MG/DL (ref 0–1.2)
BUN SERPL-MCNC: 20 MG/DL (ref 6–24)
BUN/CREAT SERPL: 25 (ref 9–20)
CALCIUM SERPL-MCNC: 9 MG/DL (ref 8.7–10.2)
CHLORIDE SERPL-SCNC: 102 MMOL/L (ref 96–106)
CHOLEST SERPL-MCNC: 146 MG/DL (ref 100–199)
CO2 SERPL-SCNC: 25 MMOL/L (ref 20–29)
CREAT SERPL-MCNC: 0.79 MG/DL (ref 0.76–1.27)
EOSINOPHIL # BLD AUTO: 0.3 X10E3/UL (ref 0–0.4)
EOSINOPHIL NFR BLD AUTO: 3 %
ERYTHROCYTE [DISTWIDTH] IN BLOOD BY AUTOMATED COUNT: 13.7 % (ref 11.6–15.4)
GLOBULIN SER CALC-MCNC: 3.6 G/DL (ref 1.5–4.5)
GLUCOSE SERPL-MCNC: 96 MG/DL (ref 65–99)
HCT VFR BLD AUTO: 37 % (ref 37.5–51)
HDLC SERPL-MCNC: 39 MG/DL
HGB BLD-MCNC: 12 G/DL (ref 13–17.7)
IMM GRANULOCYTES # BLD AUTO: 0 X10E3/UL (ref 0–0.1)
IMM GRANULOCYTES NFR BLD AUTO: 0 %
LDLC SERPL CALC-MCNC: 71 MG/DL (ref 0–99)
LYMPHOCYTES # BLD AUTO: 2.9 X10E3/UL (ref 0.7–3.1)
LYMPHOCYTES NFR BLD AUTO: 27 %
MCH RBC QN AUTO: 27.9 PG (ref 26.6–33)
MCHC RBC AUTO-ENTMCNC: 32.4 G/DL (ref 31.5–35.7)
MCV RBC AUTO: 86 FL (ref 79–97)
MONOCYTES # BLD AUTO: 0.7 X10E3/UL (ref 0.1–0.9)
MONOCYTES NFR BLD AUTO: 7 %
NEUTROPHILS # BLD AUTO: 6.5 X10E3/UL (ref 1.4–7)
NEUTROPHILS NFR BLD AUTO: 62 %
PLATELET # BLD AUTO: 251 X10E3/UL (ref 150–450)
POTASSIUM SERPL-SCNC: 4.8 MMOL/L (ref 3.5–5.2)
PROT SERPL-MCNC: 7.6 G/DL (ref 6–8.5)
RBC # BLD AUTO: 4.3 X10E6/UL (ref 4.14–5.8)
SODIUM SERPL-SCNC: 139 MMOL/L (ref 134–144)
TRIGL SERPL-MCNC: 217 MG/DL (ref 0–149)
VLDLC SERPL CALC-MCNC: 36 MG/DL (ref 5–40)
WBC # BLD AUTO: 10.5 X10E3/UL (ref 3.4–10.8)

## 2021-09-18 LAB — DRUGS UR: NORMAL

## 2021-09-30 DIAGNOSIS — I10 ESSENTIAL HYPERTENSION: ICD-10-CM

## 2021-09-30 RX ORDER — LISINOPRIL 40 MG/1
TABLET ORAL
Qty: 30 TABLET | Refills: 5 | Status: SHIPPED | OUTPATIENT
Start: 2021-09-30 | End: 2022-03-22

## 2021-09-30 NOTE — TELEPHONE ENCOUNTER
Rx Refill Note  Requested Prescriptions     Pending Prescriptions Disp Refills   • lisinopril (PRINIVIL,ZESTRIL) 40 MG tablet [Pharmacy Med Name: LISINOPRIL 40 MG TABLET] 30 tablet 5     Sig: TAKE 1 TABLET BY MOUTH EVERY DAY     Med last filled: 3/18/21    Last office visit with prescribing clinician: 9/13/2021      Next office visit with prescribing clinician: Visit date not found            Unique Coronado RN  09/30/21, 07:31 CDT

## 2021-10-12 DIAGNOSIS — M54.42 CHRONIC BILATERAL LOW BACK PAIN WITH LEFT-SIDED SCIATICA: ICD-10-CM

## 2021-10-12 DIAGNOSIS — G89.29 CHRONIC BILATERAL LOW BACK PAIN WITH LEFT-SIDED SCIATICA: ICD-10-CM

## 2021-10-12 NOTE — TELEPHONE ENCOUNTER
Caller: Raimundo Ramos    Relationship: Self    Medication requested (name and dosage): gabapentin (Neurontin) 800 MG tablet    Pharmacy where request should be sent: [facility]/pharmacy #[id number] - [place], [place] - [address] [phone] [facility] [phone] FX     Best call back [phone]    Does the patient have less than a 3 day supply:  [x] Yes  [] No    Rivera Ramos Rep   10/12/21 10:46 CDT

## 2021-10-12 NOTE — TELEPHONE ENCOUNTER
Controlled Substance Refill Request:     Medications needing Refilled: Gabapentin    Pharmacy: [facility] [place]    Last Office Visit: 09/13/2021  Next Office Visit: TBA    UDS:  Last Urine Drug Screen: 09/13/2021  Result: Positive for Gabapentin    Next Urine Drug Screen Scheduled: 12/13/2021      Controlled Substance Agreement: up to date

## 2021-10-13 RX ORDER — GABAPENTIN 800 MG/1
800 TABLET ORAL 4 TIMES DAILY
Qty: 120 TABLET | Refills: 0 | Status: SHIPPED | OUTPATIENT
Start: 2021-10-13 | End: 2021-11-12 | Stop reason: SDUPTHER

## 2021-11-01 DIAGNOSIS — Z71.6 ENCOUNTER FOR TOBACCO USE CESSATION COUNSELING: ICD-10-CM

## 2021-11-01 RX ORDER — BUPROPION HYDROCHLORIDE 150 MG/1
TABLET ORAL
Qty: 30 TABLET | Refills: 1 | Status: SHIPPED | OUTPATIENT
Start: 2021-11-01 | End: 2022-01-03

## 2021-11-01 NOTE — TELEPHONE ENCOUNTER
Rx Refill Note  Requested Prescriptions     Pending Prescriptions Disp Refills   • buPROPion XL (WELLBUTRIN XL) 150 MG 24 hr tablet [Pharmacy Med Name: BUPROPION HCL  MG TABLET] 30 tablet 1     Sig: TAKE 1 TABLET BY MOUTH EVERY DAY      Last office visit with prescribing clinician: 9/13/2021      Next office visit with prescribing clinician: Visit date not found            Ivonne Ramos  11/01/21, 10:55 CDT

## 2021-11-12 DIAGNOSIS — M54.42 CHRONIC BILATERAL LOW BACK PAIN WITH LEFT-SIDED SCIATICA: ICD-10-CM

## 2021-11-12 DIAGNOSIS — G89.29 CHRONIC BILATERAL LOW BACK PAIN WITH LEFT-SIDED SCIATICA: ICD-10-CM

## 2021-11-12 RX ORDER — GABAPENTIN 800 MG/1
800 TABLET ORAL 4 TIMES DAILY
Qty: 120 TABLET | Refills: 0 | Status: SHIPPED | OUTPATIENT
Start: 2021-11-12 | End: 2021-12-13 | Stop reason: SDUPTHER

## 2021-11-12 NOTE — TELEPHONE ENCOUNTER
Rx Refill Note  Requested Prescriptions     Pending Prescriptions Disp Refills   • gabapentin (Neurontin) 800 MG tablet 120 tablet 0     Sig: Take 1 tablet by mouth 4 (Four) Times a Day.      Last office visit with prescribing clinician: 9/13/2021      Next office visit with prescribing clinician: Visit date not found        Compliance Drug Analysis, Ur - Urine, Clean Catch (09/13/2021 10:45)      Radha Parker CMA  11/12/21, 08:46 CST

## 2021-11-12 NOTE — TELEPHONE ENCOUNTER
Caller: Raimundo Ramos    Relationship: Self    Requested Prescriptions:   Requested Prescriptions     Pending Prescriptions Disp Refills   • gabapentin (Neurontin) 800 MG tablet 120 tablet 0     Sig: Take 1 tablet by mouth 4 (Four) Times a Day.        Pharmacy where request should be sent: [facility]/PHARMACY #[id number] - [place], [place] - [address] [phone] [facility] [phone] FX     Kael Enrique, PCT   11/12/21 08:14 CST

## 2021-12-02 DIAGNOSIS — M19.90 ARTHRITIS: ICD-10-CM

## 2021-12-02 RX ORDER — IBUPROFEN 800 MG/1
TABLET ORAL
Qty: 60 TABLET | Refills: 5 | Status: SHIPPED | OUTPATIENT
Start: 2021-12-02 | End: 2022-06-03

## 2021-12-02 NOTE — TELEPHONE ENCOUNTER
Rx Refill Note  Requested Prescriptions     Pending Prescriptions Disp Refills   • ibuprofen (ADVIL,MOTRIN) 800 MG tablet [Pharmacy Med Name: IBUPROFEN 800 MG TABLET] 60 tablet 5     Sig: TAKE 1 TABLET BY MOUTH TWICE A DAY      Last office visit with prescribing clinician: 9/13/2021      Next office visit with prescribing clinician: Visit date not found     Compliance Drug Analysis, Ur - Urine, Clean Catch (09/13/2021 10:45)         Ericka Dyson MA  12/02/21, 07:30 CST

## 2021-12-13 DIAGNOSIS — G89.29 CHRONIC BILATERAL LOW BACK PAIN WITH LEFT-SIDED SCIATICA: ICD-10-CM

## 2021-12-13 DIAGNOSIS — M54.42 CHRONIC BILATERAL LOW BACK PAIN WITH LEFT-SIDED SCIATICA: ICD-10-CM

## 2021-12-13 RX ORDER — GABAPENTIN 800 MG/1
800 TABLET ORAL 4 TIMES DAILY
Qty: 120 TABLET | Refills: 0 | Status: SHIPPED | OUTPATIENT
Start: 2021-12-13 | End: 2022-01-19 | Stop reason: SDUPTHER

## 2021-12-13 NOTE — TELEPHONE ENCOUNTER
Caller: Raimundo Ramos    Relationship: Self    Best call back number: [phone] (H)    Requested Prescriptions:   Requested Prescriptions     Pending Prescriptions Disp Refills   • gabapentin (Neurontin) 800 MG tablet 120 tablet 0     Sig: Take 1 tablet by mouth 4 (Four) Times a Day.        Pharmacy where request should be sent: [facility]/PHARMACY #[id number] - [place], [place] - [address] [phone] [facility] [phone] FX     Additional details provided by patient: completley out     Does the patient have less than a 3 day supply:  [x] Yes  [] No    Rivera Sams Rep   12/13/21 12:44 CST

## 2021-12-20 ENCOUNTER — TELEPHONE (OUTPATIENT)
Dept: INTERNAL MEDICINE | Facility: CLINIC | Age: 56
End: 2021-12-20

## 2021-12-20 NOTE — TELEPHONE ENCOUNTER
Caller: Raimundo Ramos    Relationship: Self    Best call back number:[phone]     What medication are you requesting: ANTIBIOTIC   What are your current symptoms: UPPER RESP     How long have you been experiencing symptoms: 2 DAYS     Have you had these symptoms before:    [x] Yes  [] No    Have you been treated for these symptoms before:   [x] Yes  [] No    If a prescription is needed, what is your preferred pharmacy and phone number:      Additional notes:[id number] - [place] - [address] [facility] [phone] [facility] [phone]   [phone]

## 2021-12-21 ENCOUNTER — OFFICE VISIT (OUTPATIENT)
Dept: INTERNAL MEDICINE | Facility: CLINIC | Age: 56
End: 2021-12-21

## 2021-12-21 VITALS — HEIGHT: 71 IN | BODY MASS INDEX: 40.18 KG/M2 | TEMPERATURE: 98 F | WEIGHT: 287 LBS

## 2021-12-21 DIAGNOSIS — J40 BRONCHITIS: Primary | ICD-10-CM

## 2021-12-21 PROCEDURE — 99213 OFFICE O/P EST LOW 20 MIN: CPT

## 2021-12-21 PROCEDURE — 96372 THER/PROPH/DIAG INJ SC/IM: CPT

## 2021-12-21 RX ORDER — METHYLPREDNISOLONE SODIUM SUCCINATE 125 MG/2ML
80 INJECTION, POWDER, LYOPHILIZED, FOR SOLUTION INTRAMUSCULAR; INTRAVENOUS ONCE
Status: DISCONTINUED | OUTPATIENT
Start: 2021-12-21 | End: 2021-12-21

## 2021-12-21 RX ORDER — ALBUTEROL SULFATE 2.5 MG/3ML
2.5 SOLUTION RESPIRATORY (INHALATION) EVERY 4 HOURS PRN
Qty: 180 ML | Refills: 0 | Status: SHIPPED | OUTPATIENT
Start: 2021-12-21 | End: 2021-12-31

## 2021-12-21 RX ORDER — METHYLPREDNISOLONE SODIUM SUCCINATE 125 MG/2ML
80 INJECTION, POWDER, LYOPHILIZED, FOR SOLUTION INTRAMUSCULAR; INTRAVENOUS ONCE
Status: COMPLETED | OUTPATIENT
Start: 2021-12-21 | End: 2021-12-21

## 2021-12-21 RX ADMIN — METHYLPREDNISOLONE SODIUM SUCCINATE 80 MG: 125 INJECTION, POWDER, LYOPHILIZED, FOR SOLUTION INTRAMUSCULAR; INTRAVENOUS at 12:13

## 2021-12-21 NOTE — PROGRESS NOTES
Subjective     Chief Complaint   Patient presents with   • Cough     Symptoms started 3-4 days ago. Productive cough (yellow/green)   • Nasal Congestion   • Headache       History of Present Illness  Patient is a 56 year old male who presents with cough for the last 3-4 days. Has a sinus headache and nasal congestion. Congested, Productive cough, sputum is yellowish green. States he feels like his head is like a balloon.  Has tried several over-the-counter medicines and nothing is helping at this point. No fevers. No shortness of breath or chest pain. Has had some wheezing.     Patient's PMR from outside medical facility reviewed and noted.    Review of Systems   Constitutional: Negative for chills, fatigue and fever.   HENT: Positive for congestion, postnasal drip, rhinorrhea, sinus pressure and sore throat. Negative for ear pain.    Eyes: Negative for visual disturbance.   Respiratory: Positive for cough and chest tightness. Negative for shortness of breath.    Cardiovascular: Negative for chest pain and palpitations.   Gastrointestinal: Negative for abdominal pain, blood in stool, constipation, diarrhea, nausea and vomiting.   Genitourinary: Negative for decreased urine volume, difficulty urinating and hematuria.   Musculoskeletal: Negative for myalgias, neck pain and neck stiffness.   Skin: Negative for color change, rash and wound.   Allergic/Immunologic: Positive for environmental allergies.   Neurological: Positive for headaches. Negative for dizziness and light-headedness.   Psychiatric/Behavioral: Negative for behavioral problems and dysphoric mood. The patient is not nervous/anxious.       Otherwise complete ROS reviewed and negative except as mentioned in the HPI.    Past Medical History:   Past Medical History:   Diagnosis Date   • Anxiety    • Back pain    • Chronic pain    • Depression    • Elevated cholesterol    • Hypertension      Past Surgical History:  Past Surgical History:   Procedure  Laterality Date   • WOUND CLOSURE Right 5/28/2019    Procedure: Complex closure of open scalp wound avulsion defect 7x 4.5 cm with rotation/ advancement flap closure 9cm x 5 cm;  Surgeon: Fam Cardoso MD;  Location: [facility];  Service: ENT     Social History:  reports that he has been smoking cigarettes. He has a 19.00 pack-year smoking history. He has never used smokeless tobacco. He reports previous alcohol use. He reports that he does not use drugs.    Family History: family history includes Diabetes in his mother; Hyperlipidemia in his mother; Hypertension in his father and mother; Stroke in his maternal grandfather.       Allergies:  No Known Allergies  Medications:  Prior to Admission medications    Medication Sig Start Date End Date Taking? Authorizing Provider   albuterol sulfate  (90 Base) MCG/ACT inhaler Inhale 2 puffs Every 4 (Four) Hours As Needed for Wheezing. 6/17/21  Yes Nella Flores DO   amLODIPine (NORVASC) 5 MG tablet Take 5 mg by mouth Daily. 7/4/21  Yes ProviderJanie MD   atomoxetine (Strattera) 60 MG capsule Take 1 capsule by mouth Daily. 8/11/21  Yes Nella Flores DO   buprenorphine-naloxone (SUBOXONE) 8-2 MG per SL tablet Place 1 tablet under the tongue Daily.   Yes ProviderJanie MD   buPROPion XL (WELLBUTRIN XL) 150 MG 24 hr tablet TAKE 1 TABLET BY MOUTH EVERY DAY 11/1/21  Yes Nella Flores DO   clotrimazole-betamethasone (Lotrisone) 1-0.05 % cream Apply  topically to the appropriate area as directed 2 (Two) Times a Day. 2/8/21  Yes Nella Flores DO   fluticasone (FLONASE) 50 MCG/ACT nasal spray 2 sprays into the nostril(s) as directed by provider Daily. 2/15/21  Yes Nella Flores DO   gabapentin (Neurontin) 800 MG tablet Take 1 tablet by mouth 4 (Four) Times a Day. 12/13/21  Yes Nella Flores DO   ibuprofen (ADVIL,MOTRIN) 800 MG tablet TAKE 1 TABLET BY MOUTH TWICE A DAY 12/2/21  Yes Nella Flores DO   lisinopril  "(PRINIVIL,ZESTRIL) 40 MG tablet TAKE 1 TABLET BY MOUTH EVERY DAY 9/30/21  Yes Nella Flores DO   Loratadine (CLARITIN PO) Take 10 mg by mouth Daily.   Yes Janie Bueno MD   mupirocin (Bactroban) 2 % ointment Apply  topically to the appropriate area as directed 3 (Three) Times a Day. 5/6/21  Yes Nella Flores DO   Omega-3 Fatty Acids (FISH OIL) 1000 MG capsule capsule Take 1,000 mg by mouth Daily With Breakfast.   Yes Janie Bueno MD   polyethylene glycol (GoLYTELY) 236 g solution Take as directed by office instructions. 6/22/21  Yes Sonia Guerrero APRN   tamsulosin (Flomax) 0.4 MG capsule 24 hr capsule Take 1 capsule by mouth Daily. 7/29/21  Yes Nella Flores DO   traZODone (DESYREL) 300 MG tablet Take 300 mg by mouth Every Night.   Yes Janie Bueno MD   DULoxetine (CYMBALTA) 60 MG capsule Take 1 capsule by mouth Daily for 30 days. 4/29/21 5/29/21  Nella Flores DO       Objective     Vital Signs: Temp 98 °F (36.7 °C) (Skin)   Ht 180.3 cm (71\")   Wt 130 kg (287 lb)   BMI 40.03 kg/m²   Physical Exam  Constitutional:       Appearance: Normal appearance. He is normal weight. He is not ill-appearing.   HENT:      Head: Normocephalic and atraumatic.      Right Ear: Ear canal and external ear normal. There is no impacted cerumen.      Left Ear: Ear canal and external ear normal. There is no impacted cerumen.      Nose: Congestion and rhinorrhea present.      Mouth/Throat:      Pharynx: Posterior oropharyngeal erythema present. No oropharyngeal exudate.   Eyes:      General:         Right eye: No discharge.         Left eye: No discharge.      Extraocular Movements: Extraocular movements intact.      Conjunctiva/sclera: Conjunctivae normal.      Pupils: Pupils are equal, round, and reactive to light.   Cardiovascular:      Rate and Rhythm: Regular rhythm.      Heart sounds: Normal heart sounds. No murmur heard.      Pulmonary:      Effort: No respiratory " distress.      Breath sounds: Wheezing and rhonchi present.   Abdominal:      General: Bowel sounds are normal.      Palpations: Abdomen is soft.      Tenderness: There is no abdominal tenderness.   Musculoskeletal:         General: Normal range of motion.      Cervical back: Normal range of motion and neck supple.      Right lower leg: No edema.      Left lower leg: No edema.   Skin:     General: Skin is warm and dry.      Findings: No erythema or rash.   Neurological:      General: No focal deficit present.      Mental Status: He is alert and oriented to person, place, and time. Mental status is at baseline.   Psychiatric:         Mood and Affect: Mood normal.         Behavior: Behavior normal.       Patient's Body mass index is 40.03 kg/m². indicating that he is morbidly obese (BMI > 40 or > 35 with obesity - related health condition). Obesity-related health conditions include the following: hypertension. Obesity is unchanged. BMI is is above average; no BMI management plan is appropriate.     Results Reviewed:  Glucose   Date Value Ref Range Status   09/13/2021 96 65 - 99 mg/dL Final   02/18/2020 79 74 - 109 mg/dL Final     BUN   Date Value Ref Range Status   09/13/2021 20 6 - 24 mg/dL Final   02/18/2020 20 6 - 20 mg/dL Final     Creatinine   Date Value Ref Range Status   09/13/2021 0.79 0.76 - 1.27 mg/dL Final   02/18/2020 0.7 0.5 - 1.2 mg/dL Final     Sodium   Date Value Ref Range Status   09/13/2021 139 134 - 144 mmol/L Final   02/18/2020 140 136 - 145 mmol/L Final     Potassium   Date Value Ref Range Status   09/13/2021 4.8 3.5 - 5.2 mmol/L Final   02/18/2020 4.4 3.5 - 5.0 mmol/L Final     Chloride   Date Value Ref Range Status   09/13/2021 102 96 - 106 mmol/L Final   02/18/2020 102 98 - 111 mmol/L Final     CO2   Date Value Ref Range Status   02/18/2020 24 22 - 29 mmol/L Final     Total CO2   Date Value Ref Range Status   09/13/2021 25 20 - 29 mmol/L Final     Calcium   Date Value Ref Range Status    09/13/2021 9.0 8.7 - 10.2 mg/dL Final   02/18/2020 9.4 8.6 - 10.0 mg/dL Final     ALT (SGPT)   Date Value Ref Range Status   09/13/2021 14 0 - 44 IU/L Final   02/18/2020 8 5 - 41 U/L Final     AST (SGOT)   Date Value Ref Range Status   09/13/2021 13 0 - 40 IU/L Final   02/18/2020 12 5 - 40 U/L Final     WBC   Date Value Ref Range Status   09/13/2021 10.5 3.4 - 10.8 x10E3/uL Final   02/18/2020 7.5 4.8 - 10.8 K/uL Final     Hematocrit   Date Value Ref Range Status   09/13/2021 37.0 (L) 37.5 - 51.0 % Final   02/18/2020 39.5 (L) 42.0 - 52.0 % Final     Platelets   Date Value Ref Range Status   09/13/2021 251 150 - 450 x10E3/uL Final   02/18/2020 281 130 - 400 K/uL Final     Triglycerides   Date Value Ref Range Status   09/13/2021 217 (H) 0 - 149 mg/dL Final   02/18/2020 98 0 - 149 mg/dL Final     HDL Cholesterol   Date Value Ref Range Status   09/13/2021 39 (L) >39 mg/dL Final   02/18/2020 44 (L) 55 - 121 mg/dL Final     Comment:     VALUES>60 MG/DL ARE ASSOCIATED WITH A DECREASED RISK OF  ATHEROSCLEROTIC CARDIOVASCULAR DISEASE     LDL Cholesterol    Date Value Ref Range Status   02/18/2020 77 <100 mg/dL Final     Comment:     <100 MG/DL=OPITIMAL    100-129 MG/DL=DESIRABLE    130-159 MG/DL BORDERLINE=INCREASED RISK OF ATHEROSCLEROTIC  CARDIOVASCULAR DISEASE    > OR = 160 MG/DL=ASSOCIATED WITH AN INCREASE RISK OF  ATHEROSCLEROTIC CARDIOVASCULAR DISEASE     LDL Chol Calc (NIH)   Date Value Ref Range Status   09/13/2021 71 0 - 99 mg/dL Final     Reviewed results of chest xray with patient in office.     Assessment / Plan     Assessment/Plan:  1. Bronchitis  - XR Chest PA & Lateral (In Office)  - methylPREDNISolone sodium succinate (SOLU-Medrol) injection 80 mg  - cefTRIAXone (ROCEPHIN) 350 mg/ml in lidocaine 1% IM syringe (1 gm vial)      Return if symptoms worsen or fail to improve. unless patient needs to be seen sooner or acute issues arise.    Code Status: Full    I have discussed the patient results/orders and and  plan/recommendation with them at today's visit.      Gladys Tao, APRN   12/21/2021

## 2021-12-27 DIAGNOSIS — F90.9 ADULT ADHD: ICD-10-CM

## 2021-12-27 DIAGNOSIS — I10 ESSENTIAL HYPERTENSION: ICD-10-CM

## 2021-12-27 RX ORDER — AMLODIPINE BESYLATE 5 MG/1
TABLET ORAL
Qty: 30 TABLET | Refills: 11 | Status: SHIPPED | OUTPATIENT
Start: 2021-12-27

## 2021-12-27 NOTE — TELEPHONE ENCOUNTER
Rx Refill Note  Requested Prescriptions     Pending Prescriptions Disp Refills   • atomoxetine (STRATTERA) 60 MG capsule [Pharmacy Med Name: ATOMOXETINE HCL 60 MG CAPSULE] 30 capsule 1     Sig: TAKE 1 CAPSULE BY MOUTH EVERY DAY      Last office visit with prescribing clinician: 9/13/2021      Next office visit with prescribing clinician: 1/4/2022            Ivonne Ramos  12/27/21, 15:08 CST

## 2021-12-30 RX ORDER — ATOMOXETINE 60 MG/1
CAPSULE ORAL
Qty: 30 CAPSULE | Refills: 1 | Status: SHIPPED | OUTPATIENT
Start: 2021-12-30 | End: 2022-03-02

## 2022-01-01 DIAGNOSIS — Z71.6 ENCOUNTER FOR TOBACCO USE CESSATION COUNSELING: ICD-10-CM

## 2022-01-03 RX ORDER — BUPROPION HYDROCHLORIDE 150 MG/1
TABLET ORAL
Qty: 30 TABLET | Refills: 1 | Status: SHIPPED | OUTPATIENT
Start: 2022-01-03 | End: 2022-03-17

## 2022-01-03 NOTE — TELEPHONE ENCOUNTER
Rx Refill Note  Requested Prescriptions     Pending Prescriptions Disp Refills   • buPROPion XL (WELLBUTRIN XL) 150 MG 24 hr tablet [Pharmacy Med Name: BUPROPION HCL  MG TABLET] 30 tablet 1     Sig: TAKE 1 TABLET BY MOUTH EVERY DAY   Med last filled:  11/01/21  Last office visit with prescribing clinician: 9/13/2021      Next office visit with prescribing clinician: 1/4/2022            Unique Coronado RN  01/03/22, 08:11 CST

## 2022-01-14 ENCOUNTER — CLINICAL SUPPORT (OUTPATIENT)
Dept: INTERNAL MEDICINE | Facility: CLINIC | Age: 57
End: 2022-01-14

## 2022-01-14 DIAGNOSIS — Z79.899 LONG TERM USE OF DRUG: Primary | ICD-10-CM

## 2022-01-14 NOTE — PROGRESS NOTES
URINE DRUG SCREEN COLLECTION      Raimundo Ramos  [id number]  1965      Raimundo Ramos presented to [facility] today for a UDS.     Nurse/MA performing UDS: Ericka Dyson MA    Before patient was brought back, a bluing agent was placed in the toilet and the collection area was checked to ensure it was free from anything that could be used to alter urine sample. The patient was asked to remove any unnecessary outer clothing and empty pockets. The patient was asked to leave all personal belongings in the patient room. The patient was instructed not to flush toilet and to not wash hands until after specimen was handed over. The patient was given a temperature controlled sample cup and was instructed on how to collect specimen properly. Ericka Dyson MA stayed outside the bathroom while the patient's sample was given to ensure all protocols were maintained. After handing sample over, the patient was instructed to wash hands.

## 2022-01-19 ENCOUNTER — OFFICE VISIT (OUTPATIENT)
Dept: INTERNAL MEDICINE | Facility: CLINIC | Age: 57
End: 2022-01-19

## 2022-01-19 VITALS
BODY MASS INDEX: 40.18 KG/M2 | HEART RATE: 85 BPM | WEIGHT: 287 LBS | SYSTOLIC BLOOD PRESSURE: 170 MMHG | TEMPERATURE: 97.9 F | DIASTOLIC BLOOD PRESSURE: 93 MMHG | HEIGHT: 71 IN | OXYGEN SATURATION: 98 %

## 2022-01-19 DIAGNOSIS — G89.29 CHRONIC BILATERAL LOW BACK PAIN WITH LEFT-SIDED SCIATICA: ICD-10-CM

## 2022-01-19 DIAGNOSIS — M54.42 CHRONIC BILATERAL LOW BACK PAIN WITH LEFT-SIDED SCIATICA: ICD-10-CM

## 2022-01-19 DIAGNOSIS — L98.9 SKIN LESION: Primary | ICD-10-CM

## 2022-01-19 PROCEDURE — 99213 OFFICE O/P EST LOW 20 MIN: CPT | Performed by: INTERNAL MEDICINE

## 2022-01-19 RX ORDER — GABAPENTIN 800 MG/1
800 TABLET ORAL 4 TIMES DAILY
Qty: 120 TABLET | Refills: 0 | Status: SHIPPED | OUTPATIENT
Start: 2022-01-19 | End: 2022-02-21

## 2022-01-19 NOTE — PROGRESS NOTES
Subjective     Chief Complaint   Patient presents with   • Back Pain     3 mo fu   • Hypertension       Back Pain  This is a chronic problem. The current episode started more than 1 year ago. The problem occurs 2 to 4 times per day. Progression since onset: Controlled with medication. The pain is present in the lumbar spine. The pain radiates to the left thigh. The pain is moderate. The symptoms are aggravated by position. Pertinent negatives include no dysuria or fever. Risk factors include obesity, sedentary lifestyle and lack of exercise. He has tried heat and ice for the symptoms. The treatment provided mild relief.     Patient states that he feels like his BP is elevated because he hasn't had his Neurontin. Says that he ran out on Friday but he missed his scheduled appointment.   Medication helps to control his back and left leg pain. Also relays chronic neck pain.     Lesion on the right temporal area on previous scar. It will blister and keeps a chronic skin lesion. Concern for skin cancer..     Patient's PMR from outside medical facility reviewed and noted.    Review of Systems   Constitutional: Negative for chills and fever.   HENT: Negative for congestion and rhinorrhea.    Respiratory: Negative for cough.    Cardiovascular: Negative for leg swelling.   Gastrointestinal: Negative for constipation and diarrhea.   Genitourinary: Negative for dysuria and hematuria.   Musculoskeletal: Positive for arthralgias and back pain.        Chronic back pain   Skin: Positive for color change and wound.   Psychiatric/Behavioral: Positive for sleep disturbance. Negative for dysphoric mood.      Otherwise complete ROS reviewed and negative except as mentioned in the HPI.    Past Medical History:   Past Medical History:   Diagnosis Date   • Anxiety    • Back pain    • Chronic pain    • Depression    • Elevated cholesterol    • Hypertension      Past Surgical History:  Past Surgical History:   Procedure Laterality  Date   • WOUND CLOSURE Right 5/28/2019    Procedure: Complex closure of open scalp wound avulsion defect 7x 4.5 cm with rotation/ advancement flap closure 9cm x 5 cm;  Surgeon: Fam Cardoso MD;  Location: [facility];  Service: ENT     Social History:  reports that he has been smoking cigarettes. He has a 19.00 pack-year smoking history. He has never used smokeless tobacco. He reports previous alcohol use. He reports that he does not use drugs.    Family History: family history includes Diabetes in his mother; Hyperlipidemia in his mother; Hypertension in his father and mother; Stroke in his maternal grandfather.      Allergies:  No Known Allergies  Medications:  Prior to Admission medications    Medication Sig Start Date End Date Taking? Authorizing Provider   albuterol sulfate  (90 Base) MCG/ACT inhaler Inhale 2 puffs Every 4 (Four) Hours As Needed for Wheezing. 6/17/21  Yes Nella Flores DO   amLODIPine (NORVASC) 5 MG tablet Take 5 mg by mouth Daily. 7/4/21  Yes ProviderJanie MD   atomoxetine (STRATTERA) 60 MG capsule TAKE 1 CAPSULE BY MOUTH EVERY DAY 12/30/21  Yes Nella Flores DO   buprenorphine-naloxone (SUBOXONE) 8-2 MG per SL tablet Place 1 tablet under the tongue Daily.   Yes ProviderJanie MD   buPROPion XL (WELLBUTRIN XL) 150 MG 24 hr tablet TAKE 1 TABLET BY MOUTH EVERY DAY 1/3/22  Yes Nella Flores DO   clotrimazole-betamethasone (Lotrisone) 1-0.05 % cream Apply  topically to the appropriate area as directed 2 (Two) Times a Day. 2/8/21  Yes Nella Flores DO   fluticasone (FLONASE) 50 MCG/ACT nasal spray 2 sprays into the nostril(s) as directed by provider Daily. 2/15/21  Yes Nella Flores DO   gabapentin (Neurontin) 800 MG tablet Take 1 tablet by mouth 4 (Four) Times a Day. 12/13/21  Yes Nella Flores DO   ibuprofen (ADVIL,MOTRIN) 800 MG tablet TAKE 1 TABLET BY MOUTH TWICE A DAY 12/2/21  Yes Nella Flores DO   lisinopril  "(PRINIVIL,ZESTRIL) 40 MG tablet TAKE 1 TABLET BY MOUTH EVERY DAY 9/30/21  Yes Nella Flores DO   Loratadine (CLARITIN PO) Take 10 mg by mouth Daily.   Yes Janie Bueno MD   mupirocin (Bactroban) 2 % ointment Apply  topically to the appropriate area as directed 3 (Three) Times a Day. 5/6/21  Yes Nella Flores DO   Omega-3 Fatty Acids (FISH OIL) 1000 MG capsule capsule Take 1,000 mg by mouth Daily With Breakfast.   Yes Janie Bueno MD   polyethylene glycol (GoLYTELY) 236 g solution Take as directed by office instructions. 6/22/21  Yes Sonia Guerrero APRN   tamsulosin (Flomax) 0.4 MG capsule 24 hr capsule Take 1 capsule by mouth Daily. 7/29/21  Yes Nella Flores DO   traZODone (DESYREL) 300 MG tablet Take 300 mg by mouth Every Night.   Yes Janie Bueno MD   DULoxetine (CYMBALTA) 60 MG capsule Take 1 capsule by mouth Daily for 30 days. 4/29/21 5/29/21  Nella Flores DO       Objective     Vital Signs: /93 (BP Location: Left arm, Patient Position: Sitting, Cuff Size: Adult)   Pulse 85   Temp 97.9 °F (36.6 °C) (Infrared)   Ht 180.3 cm (71\")   Wt 130 kg (287 lb)   SpO2 98%   BMI 40.03 kg/m²   Physical Exam  Vitals reviewed.   Constitutional:       Appearance: Normal appearance.   HENT:      Head: Normocephalic and atraumatic.      Nose: Nose normal.   Eyes:      General: No scleral icterus.     Conjunctiva/sclera: Conjunctivae normal.   Cardiovascular:      Rate and Rhythm: Normal rate and regular rhythm.      Heart sounds: Normal heart sounds.   Pulmonary:      Effort: Pulmonary effort is normal.      Breath sounds: Normal breath sounds.   Musculoskeletal:         General: No tenderness.      Cervical back: Normal range of motion and neck supple.   Skin:     General: Skin is warm and dry.      Findings: Erythema and lesion present.      Comments: Right temporal skin lesion that is located in the previous scar.    Neurological:      General: No focal " deficit present.      Mental Status: He is alert.      Cranial Nerves: No cranial nerve deficit.   Psychiatric:         Mood and Affect: Mood normal.         Behavior: Behavior normal.       Patient's Body mass index is 40.03 kg/m². indicating that he is morbidly obese (BMI > 40 or > 35 with obesity - related health condition). Obesity-related health conditions include the following: hypertension. Obesity is unchanged. BMI is is above average; BMI management plan is completed. We discussed portion control and increasing exercise..      Results Reviewed:  Glucose   Date Value Ref Range Status   09/13/2021 96 65 - 99 mg/dL Final   02/18/2020 79 74 - 109 mg/dL Final     BUN   Date Value Ref Range Status   09/13/2021 20 6 - 24 mg/dL Final   02/18/2020 20 6 - 20 mg/dL Final     Creatinine   Date Value Ref Range Status   09/13/2021 0.79 0.76 - 1.27 mg/dL Final   02/18/2020 0.7 0.5 - 1.2 mg/dL Final     Sodium   Date Value Ref Range Status   09/13/2021 139 134 - 144 mmol/L Final   02/18/2020 140 136 - 145 mmol/L Final     Potassium   Date Value Ref Range Status   09/13/2021 4.8 3.5 - 5.2 mmol/L Final   02/18/2020 4.4 3.5 - 5.0 mmol/L Final     Chloride   Date Value Ref Range Status   09/13/2021 102 96 - 106 mmol/L Final   02/18/2020 102 98 - 111 mmol/L Final     CO2   Date Value Ref Range Status   02/18/2020 24 22 - 29 mmol/L Final     Total CO2   Date Value Ref Range Status   09/13/2021 25 20 - 29 mmol/L Final     Calcium   Date Value Ref Range Status   09/13/2021 9.0 8.7 - 10.2 mg/dL Final   02/18/2020 9.4 8.6 - 10.0 mg/dL Final     ALT (SGPT)   Date Value Ref Range Status   09/13/2021 14 0 - 44 IU/L Final   02/18/2020 8 5 - 41 U/L Final     AST (SGOT)   Date Value Ref Range Status   09/13/2021 13 0 - 40 IU/L Final   02/18/2020 12 5 - 40 U/L Final     WBC   Date Value Ref Range Status   09/13/2021 10.5 3.4 - 10.8 x10E3/uL Final   02/18/2020 7.5 4.8 - 10.8 K/uL Final     Hematocrit   Date Value Ref Range Status    09/13/2021 37.0 (L) 37.5 - 51.0 % Final   02/18/2020 39.5 (L) 42.0 - 52.0 % Final     Platelets   Date Value Ref Range Status   09/13/2021 251 150 - 450 x10E3/uL Final   02/18/2020 281 130 - 400 K/uL Final     Triglycerides   Date Value Ref Range Status   09/13/2021 217 (H) 0 - 149 mg/dL Final   02/18/2020 98 0 - 149 mg/dL Final     HDL Cholesterol   Date Value Ref Range Status   09/13/2021 39 (L) >39 mg/dL Final   02/18/2020 44 (L) 55 - 121 mg/dL Final     Comment:     VALUES>60 MG/DL ARE ASSOCIATED WITH A DECREASED RISK OF  ATHEROSCLEROTIC CARDIOVASCULAR DISEASE     LDL Cholesterol    Date Value Ref Range Status   02/18/2020 77 <100 mg/dL Final     Comment:     <100 MG/DL=OPITIMAL    100-129 MG/DL=DESIRABLE    130-159 MG/DL BORDERLINE=INCREASED RISK OF ATHEROSCLEROTIC  CARDIOVASCULAR DISEASE    > OR = 160 MG/DL=ASSOCIATED WITH AN INCREASE RISK OF  ATHEROSCLEROTIC CARDIOVASCULAR DISEASE     LDL Chol Calc (NIH)   Date Value Ref Range Status   09/13/2021 71 0 - 99 mg/dL Final       Assessment / Plan     Assessment/Plan:  1. Chronic bilateral low back pain with left-sided sciatica  - gabapentin (Neurontin) 800 MG tablet; Take 1 tablet by mouth 4 (Four) Times a Day.  Dispense: 120 tablet; Refill: 0    2. Skin lesion  - Ambulatory Referral to Plastic Surgery  - Mupirocin PRN       Return in about 3 months (around 4/19/2022) for Recheck, Next scheduled follow up. unless patient needs to be seen sooner or acute issues arise.    Code Status: Full    I have discussed the patient results/orders and and plan/recommendation with them at today's visit.      Nella Flores, DO   01/19/2022

## 2022-01-20 ENCOUNTER — TELEPHONE (OUTPATIENT)
Dept: INTERNAL MEDICINE | Facility: CLINIC | Age: 57
End: 2022-01-20

## 2022-01-20 RX ORDER — METHYLPREDNISOLONE 4 MG/1
TABLET ORAL
Qty: 21 TABLET | Refills: 0 | Status: SHIPPED | OUTPATIENT
Start: 2022-01-20 | End: 2022-04-20

## 2022-01-20 RX ORDER — LORATADINE AND PSEUDOEPHEDRINE SULFATE 5; 120 MG/1; MG/1
1 TABLET, EXTENDED RELEASE ORAL 2 TIMES DAILY PRN
Qty: 30 TABLET | Refills: 0 | Status: SHIPPED | OUTPATIENT
Start: 2022-01-20 | End: 2022-01-27

## 2022-01-20 RX ORDER — AZITHROMYCIN 250 MG/1
TABLET, FILM COATED ORAL
Qty: 6 TABLET | Refills: 0 | Status: SHIPPED | OUTPATIENT
Start: 2022-01-20 | End: 2022-03-31

## 2022-01-20 NOTE — TELEPHONE ENCOUNTER
Caller: Raimundo Ramos    Relationship: Self    Best call back number: [phone]     What medication are you requesting: CLARITIN D     What are your current symptoms: DRAINAGE     How long have you been experiencing symptoms: COUPLE OF DAYS     Have you had these symptoms before:    [x] Yes  [] No    Have you been treated for these symptoms before:   [x] Yes  [] No    If a prescription is needed, what is your preferred pharmacy and phone number: [facility]/PHARMACY #[id number] - [place], [place] - [address] [phone] [facility] [phone]      Additional notes: PATIENT STATED THAT HE FORGOT TO TELL DR. BERRY ABOUT THIS BUT WHEN HE LAYS DOWN AT NIGHT HE HAS A LOT OF DRAINAGE AND WOULD LIKE TO KNOW IF SOMETHING CAN BE CALLED IN TO HELP. PLEASE ADVISE.

## 2022-01-21 LAB — DRUGS UR: NORMAL

## 2022-01-25 DIAGNOSIS — F33.42 RECURRENT MAJOR DEPRESSIVE DISORDER, IN FULL REMISSION: ICD-10-CM

## 2022-01-25 RX ORDER — DULOXETIN HYDROCHLORIDE 60 MG/1
60 CAPSULE, DELAYED RELEASE ORAL DAILY
Qty: 90 CAPSULE | Refills: 3 | Status: SHIPPED | OUTPATIENT
Start: 2022-01-25 | End: 2022-04-04 | Stop reason: SDUPTHER

## 2022-01-25 NOTE — TELEPHONE ENCOUNTER
Rx Refill Note  Requested Prescriptions     Pending Prescriptions Disp Refills   • DULoxetine (CYMBALTA) 60 MG capsule 90 capsule 3     Sig: Take 1 capsule by mouth Daily for 30 days.   Med last filled: 4/29/21   Last office visit with prescribing clinician: 1/19/2022      Next office visit with prescribing clinician: 4/20/2022            Unique Coronado RN  01/25/22, 10:01 CST

## 2022-01-25 NOTE — TELEPHONE ENCOUNTER
Caller: Raimundo Ramos    Relationship: Self    Best call back number: [phone]    Requested Prescriptions:   Requested Prescriptions     Pending Prescriptions Disp Refills   • DULoxetine (CYMBALTA) 60 MG capsule 90 capsule 3     Sig: Take 1 capsule by mouth Daily for 30 days.      Pharmacy where request should be sent: [facility]/PHARMACY #[id number] - [address] [phone] [facility] [phone] FX     Does the patient have less than a 3 day supply:  [x] Yes  [] No    Rivera Cameron Rep   01/25/22 09:49 CST

## 2022-01-26 DIAGNOSIS — R35.0 URINARY FREQUENCY: ICD-10-CM

## 2022-01-26 NOTE — TELEPHONE ENCOUNTER
Rx Refill Note  Requested Prescriptions     Pending Prescriptions Disp Refills   • [employer] Allergy Relief-D12 5-120 MG per 12 hr tablet [Pharmacy Med Name: [employer] ALLERGY RELIEF-D12 TABLET] 30 tablet 0     Sig: TAKE 1 TABLET BY MOUTH 2 (TWO) TIMES A DAY AS NEEDED FOR ALLERGIES.   • tamsulosin (FLOMAX) 0.4 MG capsule 24 hr capsule [Pharmacy Med Name: TAMSULOSIN HCL 0.4 MG CAPSULE] 30 capsule 5     Sig: TAKE 1 CAPSULE BY MOUTH EVERY DAY      Last office visit with prescribing clinician: 1/19/2022      Next office visit with prescribing clinician: 4/20/2022     No need for allergy relief meds.        Ericka Dyson MA  01/26/22, 17:30 CST

## 2022-01-27 RX ORDER — TAMSULOSIN HYDROCHLORIDE 0.4 MG/1
CAPSULE ORAL
Qty: 30 CAPSULE | Refills: 5 | Status: SHIPPED | OUTPATIENT
Start: 2022-01-27 | End: 2022-07-05

## 2022-01-27 RX ORDER — LORATADINE/PSEUDOEPHEDRINE 5 MG-120MG
TABLET, EXTENDED RELEASE 12 HR ORAL
Qty: 30 TABLET | Refills: 0 | Status: SHIPPED | OUTPATIENT
Start: 2022-01-27

## 2022-02-18 DIAGNOSIS — G89.29 CHRONIC BILATERAL LOW BACK PAIN WITH LEFT-SIDED SCIATICA: ICD-10-CM

## 2022-02-18 DIAGNOSIS — M54.42 CHRONIC BILATERAL LOW BACK PAIN WITH LEFT-SIDED SCIATICA: ICD-10-CM

## 2022-02-18 RX ORDER — GABAPENTIN 800 MG/1
800 TABLET ORAL 4 TIMES DAILY
Qty: 120 TABLET | Refills: 0 | Status: CANCELLED | OUTPATIENT
Start: 2022-02-18

## 2022-02-18 NOTE — TELEPHONE ENCOUNTER
Rx Refill Note  Requested Prescriptions     Pending Prescriptions Disp Refills    gabapentin (Neurontin) 800 MG tablet 120 tablet 0     Sig: Take 1 tablet by mouth 4 (Four) Times a Day.   Med last filled:  1/19/22  Last office visit with prescribing clinician: 1/19/2022      Next office visit with prescribing clinician: 4/20/2022     ToxASSURE Select 13 (MW) - Urine, Clean Catch (01/14/2022 16:00)               Unique Coronado RN  02/18/22, 09:21 CST

## 2022-02-18 NOTE — TELEPHONE ENCOUNTER
Caller: Raimundo Ramos    Relationship: Self    Best call back number: [phone]    Requested Prescriptions:   Requested Prescriptions     Pending Prescriptions Disp Refills    gabapentin (Neurontin) 800 MG tablet 120 tablet 0     Sig: Take 1 tablet by mouth 4 (Four) Times a Day.        Pharmacy where request should be sent: [facility]/PHARMACY #[id number] - [address] [phone] [facility] [phone] FX   Does the patient have less than a 3 day supply:  [x] Yes  [] No    Rivera Ramos Rep   02/18/22 09:17 CST

## 2022-02-20 ENCOUNTER — NURSE TRIAGE (OUTPATIENT)
Dept: CALL CENTER | Facility: HOSPITAL | Age: 57
End: 2022-02-20

## 2022-02-20 DIAGNOSIS — G89.29 CHRONIC BILATERAL LOW BACK PAIN WITH LEFT-SIDED SCIATICA: ICD-10-CM

## 2022-02-20 DIAGNOSIS — M54.42 CHRONIC BILATERAL LOW BACK PAIN WITH LEFT-SIDED SCIATICA: ICD-10-CM

## 2022-02-20 NOTE — TELEPHONE ENCOUNTER
Caller states he requested refill 3 days ago. Prescription is Pending in computer. VM message left for Dr. Flores. [id number] Dr. Flores called back and states she cannot take care of this until Monday. Called pt. Back and let him know she would take care of this tomorrow. Also reminded pt Dr. Flores is not in the office on Fridays.   Reason for Disposition  • Prescription refill request for a CONTROLLED substance (e.g., narcotics, ADHD medicines)    Additional Information  • Negative: [1] Intentional drug overdose AND [2] suicidal thoughts or ideas  • Negative: Drug overdose and triager unable to answer question  • Negative: Caller requesting information unrelated to medicine  • Negative: Caller requesting information about COVID-19 Vaccine  • Negative: Caller requesting information about Emergency Contraception  • Negative: Caller requesting information about Combined Birth Control Pills  • Negative: Caller requesting information about Progestin Birth Control Pills  • Negative: Caller requesting information about Post-Op pain or medicines  • Negative: Caller requesting a prescription antibiotic (such as Penicillin) for Strep throat and has a positive culture result  • Negative: Caller requesting a prescription anti-viral med (such as Tamiflu) and has influenza (flu)  symptoms  • Negative: Immunization reaction suspected  • Negative: Rash while taking a medicine or within 3 days of stopping it  • Negative: [1] Asthma and [2] having symptoms of asthma (cough, wheezing, etc.)  • Negative: [1] Symptom of illness (e.g., headache, abdominal pain, earache, vomiting) AND [2] more than mild  • Negative: Breastfeeding questions about mother's medicines and diet  • Negative: MORE THAN A DOUBLE DOSE of a prescription or over-the-counter (OTC) drug  • Negative: [1] DOUBLE DOSE (an extra dose or lesser amount) of prescription drug AND [2] any symptoms (e.g., dizziness, nausea, pain, sleepiness)  • Negative: [1] DOUBLE DOSE (an extra  "dose or lesser amount) of over-the-counter (OTC) drug AND [2] any symptoms (e.g., dizziness, nausea, pain, sleepiness)  • Negative: Took another person's prescription drug  • Negative: [1] DOUBLE DOSE (an extra dose or lesser amount) of prescription drug AND [2] NO symptoms (Exception: a double dose of antibiotics)  • Negative: Diabetes drug error or overdose (e.g., took wrong type of insulin or took extra dose)  • Negative: [1] Prescription refill request for ESSENTIAL medicine (i.e., likelihood of harm to patient if not taken) AND [2] triager unable to refill per department policy  • Negative: [1] Prescription not at pharmacy AND [2] was prescribed by PCP recently (Exception: triager has access to EMR and prescription is recorded there. Go to Home Care and confirm for pharmacy.)  • Negative: [1] Pharmacy calling with prescription question AND [2] triager unable to answer question  • Negative: [1] Caller has URGENT medicine question about med that PCP or specialist prescribed AND [2] triager unable to answer question  • Negative: Medicine patch causing local rash or itching  • Negative: [1] Caller has medicine question about med NOT prescribed by PCP AND [2] triager unable to answer question (e.g., compatibility with other med, storage)  • Negative: Prescription request for new medicine (not a refill)    Answer Assessment - Initial Assessment Questions  1. NAME of MEDICATION: \"What medicine are you calling about?\"      Gabapentin   2. QUESTION: \"What is your question?\" (e.g., medication refill, side effect)      Needs refill   3. PRESCRIBING HCP: \"Who prescribed it?\" Reason: if prescribed by specialist, call should be referred to that group.      Dr. Flores  4. SYMPTOMS: \"Do you have any symptoms?\"      yes  5. SEVERITY: If symptoms are present, ask \"Are they mild, moderate or severe?\"      *No Answer*  6. PREGNANCY:  \"Is there any chance that you are pregnant?\" \"When was your last menstrual period?\"      *No " Follow Up:  Acute Cholecystitis    Inverval History/ROS:Patient is a 75y old  Male who presents with a chief complaint of chest pain (06 Dec 2019 08:16)    Pt was noted to again become altered as the day progressed yesterday, persistently febrile  up to 103.1, CT scan showing evidence of SBO. IR performed percutaneous cholecystostomy. Drain culture pending with gram stain growing GNR. Pt was escalated to meropenem, noted to have improvement in mental status back to baseline.    Allergies    No Known Allergies    Intolerances        ANTIMICROBIALS:  meropenem  IVPB 1000 every 8 hours      OTHER MEDS:  acetaminophen   Tablet .. 650 milliGRAM(s) Oral every 6 hours PRN  acetaminophen  IVPB .. 1000 milliGRAM(s) IV Intermittent once  atorvastatin 40 milliGRAM(s) Oral at bedtime  chlorhexidine 0.12% Liquid 15 milliLiter(s) Oral Mucosa every 12 hours  chlorhexidine 4% Liquid 1 Application(s) Topical <User Schedule>  dextrose 40% Gel 15 Gram(s) Oral once PRN  dextrose 50% Injectable 12.5 Gram(s) IV Push once  dextrose 50% Injectable 25 Gram(s) IV Push once  dextrose 50% Injectable 25 Gram(s) IV Push once  glucagon  Injectable 1 milliGRAM(s) IntraMuscular once PRN  heparin  Injectable 5000 Unit(s) SubCutaneous every 8 hours  influenza   Vaccine 0.5 milliLiter(s) IntraMuscular once  insulin glargine Injectable (LANTUS) 10 Unit(s) SubCutaneous at bedtime  insulin lispro (HumaLOG) corrective regimen sliding scale   SubCutaneous every 6 hours  pantoprazole  Injectable 40 milliGRAM(s) IV Push daily      Vital Signs Last 24 Hrs  T(C): 40.2 (06 Dec 2019 12:00), Max: 40.2 (06 Dec 2019 12:00)  T(F): 104.3 (06 Dec 2019 12:00), Max: 104.3 (06 Dec 2019 12:00)  HR: 126 (06 Dec 2019 12:00) (83 - 126)  BP: 146/64 (06 Dec 2019 12:00) (83/44 - 172/71)  BP(mean): 84 (06 Dec 2019 12:00) (54 - 94)  RR: 22 (06 Dec 2019 12:00) (14 - 29)  SpO2: 99% (06 Dec 2019 12:00) (88% - 100%)    Physical Exam  Gen: tired but oriented, at baseline per daughter at bedside  Lungs: CTA B/L, no w/r/r  Heart: RRR, no m/r/g  Abdomen: distended, TTP, cholecystostomy tube draining dark bile  Ext: no pitting edema  Skin: no rashes                          8.2    3.71  )-----------( 129      ( 06 Dec 2019 03:20 )             25.5           132<L>  |  103  |  30<H>  ----------------------------<  202<H>  4.0   |  16<L>  |  1.28    Ca    7.8<L>      06 Dec 2019 03:30  Phos  3.2       Mg     2.1         TPro  6.8  /  Alb  2.7<L>  /  TBili  0.7  /  DBili  x   /  AST  25  /  ALT  17  /  AlkPhos  48        Urinalysis Basic - ( 05 Dec 2019 20:00 )    Color: YELLOW / Appearance: Lt TURBID / S.030 / pH: 6.0  Gluc: 200 / Ketone: TRACE  / Bili: NEGATIVE / Urobili: NORMAL   Blood: MODERATE / Protein: 100 / Nitrite: NEGATIVE   Leuk Esterase: NEGATIVE / RBC: 11-25 / WBC 3-5   Sq Epi: OCC / Non Sq Epi: x / Bacteria: NEGATIVE        MICROBIOLOGY:Culture - Surgical Site:   CULTURE IN PROGRESS, FURTHER REPORT TO FOLLOW. ( @ 22:55)    Culture - Blood (19 @ 01:56)    Culture - Blood:   NO ORGANISMS ISOLATED  NO ORGANISMS ISOLATED AT 24 HOURS    Specimen Source: BLOOD VENOUS        RADIOLOGY:  < from: CT Abdomen and Pelvis w/ IV Cont (19 @ 19:09) >  IMPRESSION:     Small bowel obstruction with transition in the right lower quadrant, new.  Changes of acute cholecystitis, progressed.  Agree with the preliminary report submitted at the time of the exam. Answer*    Protocols used: MEDICATION QUESTION CALL-ADULT-AH

## 2022-02-21 RX ORDER — GABAPENTIN 800 MG/1
TABLET ORAL
Qty: 120 TABLET | Refills: 0 | Status: SHIPPED | OUTPATIENT
Start: 2022-02-21 | End: 2022-03-21 | Stop reason: SDUPTHER

## 2022-02-21 NOTE — TELEPHONE ENCOUNTER
PATIENT CALLED WANTING UPDATE ON REFILL REQUEST. HE STATES HE HAS BEEN WITHOUT THIS MEDICATION FOR 2 DAYS. [phone]

## 2022-03-02 DIAGNOSIS — F90.9 ADULT ADHD: ICD-10-CM

## 2022-03-02 RX ORDER — ATOMOXETINE 60 MG/1
CAPSULE ORAL
Qty: 30 CAPSULE | Refills: 1 | Status: SHIPPED | OUTPATIENT
Start: 2022-03-02 | End: 2022-04-20 | Stop reason: SDUPTHER

## 2022-03-02 NOTE — TELEPHONE ENCOUNTER
Rx Refill Note  Requested Prescriptions     Pending Prescriptions Disp Refills   • atomoxetine (STRATTERA) 60 MG capsule [Pharmacy Med Name: ATOMOXETINE HCL 60 MG CAPSULE] 30 capsule 1     Sig: TAKE 1 CAPSULE BY MOUTH EVERY DAY      Last office visit with prescribing clinician: 1/19/2022      Next office visit with prescribing clinician: 4/20/2022            Ericka Dyson MA  03/02/22, 07:07 CST

## 2022-03-03 RX ORDER — ALBUTEROL SULFATE 2.5 MG/.5ML
2.5 SOLUTION RESPIRATORY (INHALATION) EVERY 6 HOURS PRN
Qty: 0.5 ML | Refills: 12 | Status: SHIPPED | OUTPATIENT
Start: 2022-03-03

## 2022-03-03 RX ORDER — ALBUTEROL SULFATE 2.5 MG/.5ML
2.5 SOLUTION RESPIRATORY (INHALATION)
COMMUNITY
End: 2022-03-03 | Stop reason: SDUPTHER

## 2022-03-03 NOTE — TELEPHONE ENCOUNTER
Rx Refill Note  Requested Prescriptions     Pending Prescriptions Disp Refills   • albuterol (PROVENTIL) 2.5 MG/0.5ML nebulizer solution       Sig: Take 2.5 mg by nebulization.      Last office visit with prescribing clinician: 1/19/2022      Next office visit with prescribing clinician: 4/20/2022            Ericka Dyson MA  03/03/22, 14:59 CST

## 2022-03-17 DIAGNOSIS — Z71.6 ENCOUNTER FOR TOBACCO USE CESSATION COUNSELING: ICD-10-CM

## 2022-03-17 RX ORDER — BUPROPION HYDROCHLORIDE 150 MG/1
TABLET ORAL
Qty: 30 TABLET | Refills: 1 | Status: SHIPPED | OUTPATIENT
Start: 2022-03-17 | End: 2022-05-17

## 2022-03-17 NOTE — TELEPHONE ENCOUNTER
Rx Refill Note  Requested Prescriptions     Pending Prescriptions Disp Refills   • buPROPion XL (WELLBUTRIN XL) 150 MG 24 hr tablet [Pharmacy Med Name: BUPROPION HCL  MG TABLET] 30 tablet 1     Sig: TAKE 1 TABLET BY MOUTH EVERY DAY      Last office visit with prescribing clinician: 1/19/2022      Next office visit with prescribing clinician: 4/20/2022            Ericka Dyson MA  03/17/22, 07:24 CDT

## 2022-03-21 DIAGNOSIS — I10 ESSENTIAL HYPERTENSION: ICD-10-CM

## 2022-03-21 DIAGNOSIS — M54.42 CHRONIC BILATERAL LOW BACK PAIN WITH LEFT-SIDED SCIATICA: ICD-10-CM

## 2022-03-21 DIAGNOSIS — G89.29 CHRONIC BILATERAL LOW BACK PAIN WITH LEFT-SIDED SCIATICA: ICD-10-CM

## 2022-03-21 RX ORDER — GABAPENTIN 800 MG/1
800 TABLET ORAL 4 TIMES DAILY
Qty: 120 TABLET | Refills: 0 | Status: SHIPPED | OUTPATIENT
Start: 2022-03-21 | End: 2022-04-19 | Stop reason: SDUPTHER

## 2022-03-21 NOTE — TELEPHONE ENCOUNTER
Caller: Raimundo Ramos    Relationship: Self    Best call back number: [phone]    Requested Prescriptions:   Requested Prescriptions     Pending Prescriptions Disp Refills   • gabapentin (NEURONTIN) 800 MG tablet 120 tablet 0     Sig: Take 1 tablet by mouth 4 (Four) Times a Day.        Pharmacy where request should be sent: [facility]/PHARMACY #[id number] - [address] [phone] [facility] [phone] FX     Additional details provided by patient: PATIENT STATED HE IS OUT TODAY     Does the patient have less than a 3 day supply:  [x] Yes  [] No    Rivera Iverson Rep   03/21/22 09:17 CDT

## 2022-03-21 NOTE — TELEPHONE ENCOUNTER
Rx Refill Note  Requested Prescriptions     Pending Prescriptions Disp Refills   • lisinopril (PRINIVIL,ZESTRIL) 40 MG tablet [Pharmacy Med Name: LISINOPRIL 40 MG TABLET] 30 tablet 5     Sig: TAKE 1 TABLET BY MOUTH EVERY DAY      Last office visit with prescribing clinician: 1/19/2022      Next office visit with prescribing clinician: 4/20/2022            Unique Coronado RN  03/21/22, 15:59 CDT

## 2022-03-21 NOTE — TELEPHONE ENCOUNTER
Rx Refill Note  Requested Prescriptions     Pending Prescriptions Disp Refills   • gabapentin (NEURONTIN) 800 MG tablet 120 tablet 0     Sig: Take 1 tablet by mouth 4 (Four) Times a Day.   Med last filled:  2/21/22  Last office visit with prescribing clinician: 1/19/2022      Next office visit with prescribing clinician: 4/20/2022       [facility] 13 (MW) - Urine, Clean Catch (01/14/2022 16:00)             Unique Coronado RN  03/21/22, 09:46 CDT

## 2022-03-22 RX ORDER — LISINOPRIL 40 MG/1
TABLET ORAL
Qty: 30 TABLET | Refills: 5 | Status: SHIPPED | OUTPATIENT
Start: 2022-03-22 | End: 2022-09-19

## 2022-03-31 ENCOUNTER — OFFICE VISIT (OUTPATIENT)
Dept: INTERNAL MEDICINE | Facility: CLINIC | Age: 57
End: 2022-03-31

## 2022-03-31 VITALS
WEIGHT: 287 LBS | BODY MASS INDEX: 40.18 KG/M2 | RESPIRATION RATE: 16 BRPM | TEMPERATURE: 97.7 F | HEART RATE: 91 BPM | SYSTOLIC BLOOD PRESSURE: 165 MMHG | DIASTOLIC BLOOD PRESSURE: 80 MMHG | HEIGHT: 71 IN | OXYGEN SATURATION: 95 %

## 2022-03-31 DIAGNOSIS — R09.81 SINUS CONGESTION: ICD-10-CM

## 2022-03-31 DIAGNOSIS — Z12.11 ENCOUNTER FOR SCREENING COLONOSCOPY: Primary | ICD-10-CM

## 2022-03-31 DIAGNOSIS — R42 DIZZINESS: ICD-10-CM

## 2022-03-31 PROBLEM — M54.42 CHRONIC LEFT-SIDED LOW BACK PAIN WITH LEFT-SIDED SCIATICA: Status: ACTIVE | Noted: 2018-09-17

## 2022-03-31 PROBLEM — E29.1 TESTICULAR HYPOFUNCTION: Status: ACTIVE | Noted: 2022-03-31

## 2022-03-31 PROBLEM — R94.5 ABNORMAL LIVER FUNCTION: Status: ACTIVE | Noted: 2022-03-31

## 2022-03-31 PROBLEM — L73.9 ACUTE FOLLICULITIS: Status: ACTIVE | Noted: 2022-03-31

## 2022-03-31 PROBLEM — I10 HYPERTENSIVE DISORDER: Status: ACTIVE | Noted: 2022-03-31

## 2022-03-31 PROBLEM — M17.10 ARTHRITIS OF KNEE: Status: ACTIVE | Noted: 2022-03-31

## 2022-03-31 PROBLEM — M46.1 SACROILIITIS (HCC): Status: ACTIVE | Noted: 2018-12-13

## 2022-03-31 PROBLEM — J30.2 SEASONAL ALLERGIES: Status: ACTIVE | Noted: 2022-03-31

## 2022-03-31 PROBLEM — R60.9 EDEMA: Status: ACTIVE | Noted: 2022-03-31

## 2022-03-31 PROBLEM — F41.1 GAD (GENERALIZED ANXIETY DISORDER): Status: ACTIVE | Noted: 2018-09-17

## 2022-03-31 PROBLEM — G89.29 CHRONIC PAIN: Status: ACTIVE | Noted: 2022-03-31

## 2022-03-31 PROBLEM — E78.5 HYPERLIPIDEMIA: Status: ACTIVE | Noted: 2022-03-31

## 2022-03-31 PROBLEM — L25.5 CONTACT DERMATITIS DUE TO PLANTS, EXCEPT FOOD: Status: ACTIVE | Noted: 2022-03-31

## 2022-03-31 PROBLEM — F41.9 ANXIETY: Status: ACTIVE | Noted: 2022-03-31

## 2022-03-31 PROBLEM — T24.009A: Status: ACTIVE | Noted: 2022-03-31

## 2022-03-31 PROBLEM — G89.29 CHRONIC LEFT-SIDED LOW BACK PAIN WITH LEFT-SIDED SCIATICA: Status: ACTIVE | Noted: 2018-09-17

## 2022-03-31 PROBLEM — M10.9 GOUT: Status: ACTIVE | Noted: 2022-03-31

## 2022-03-31 PROBLEM — M62.81 MUSCLE WEAKNESS: Status: ACTIVE | Noted: 2022-03-31

## 2022-03-31 PROBLEM — M79.18 BILATERAL MYOFASCIAL PAIN: Status: ACTIVE | Noted: 2018-12-13

## 2022-03-31 PROBLEM — L01.00 IMPETIGO: Status: ACTIVE | Noted: 2022-03-31

## 2022-03-31 PROBLEM — M79.673 FOOT PAIN: Status: ACTIVE | Noted: 2022-03-31

## 2022-03-31 PROCEDURE — 99214 OFFICE O/P EST MOD 30 MIN: CPT | Performed by: INTERNAL MEDICINE

## 2022-03-31 RX ORDER — MECLIZINE HYDROCHLORIDE 25 MG/1
25 TABLET ORAL 3 TIMES DAILY PRN
Qty: 60 TABLET | Refills: 1 | Status: SHIPPED | OUTPATIENT
Start: 2022-03-31 | End: 2022-11-18

## 2022-03-31 RX ORDER — GUAIFENESIN AND DEXTROMETHORPHAN HYDROBROMIDE 600; 30 MG/1; MG/1
1 TABLET, EXTENDED RELEASE ORAL 2 TIMES DAILY PRN
Qty: 45 TABLET | Refills: 1 | Status: SHIPPED | OUTPATIENT
Start: 2022-03-31

## 2022-03-31 RX ORDER — AZITHROMYCIN 250 MG/1
TABLET, FILM COATED ORAL
Qty: 6 TABLET | Refills: 0 | Status: SHIPPED | OUTPATIENT
Start: 2022-03-31 | End: 2022-04-20

## 2022-03-31 NOTE — PROGRESS NOTES
Subjective     Chief Complaint   Patient presents with   • Dizziness   • Nausea       History of Present Illness  I am dizzy.   I have been dizzy all morning.   I thought I was going to pass out a couple times.     I pooped a little blood this am. Constipated two days ago. Calls it a clot.     I have been beating allergies to death.   Ambulatory 02 is >92%    Patient's PMR from outside medical facility reviewed and noted.    Review of Systems   Constitutional: Negative for chills and fever.   HENT: Positive for congestion and sinus pain.    Respiratory: Positive for cough, shortness of breath and wheezing.    Cardiovascular: Negative for chest pain and leg swelling.   Gastrointestinal: Negative for constipation and diarrhea.   Genitourinary: Negative for dysuria and hematuria.   Allergic/Immunologic: Positive for environmental allergies. Negative for immunocompromised state.   Neurological: Positive for dizziness. Negative for headaches.      Otherwise complete ROS reviewed and negative except as mentioned in the HPI.    Past Medical History:   Past Medical History:   Diagnosis Date   • Anxiety    • Back pain    • Chronic pain    • Depression    • Elevated cholesterol    • Hypertension      Past Surgical History:  Past Surgical History:   Procedure Laterality Date   • WOUND CLOSURE Right 5/28/2019    Procedure: Complex closure of open scalp wound avulsion defect 7x 4.5 cm with rotation/ advancement flap closure 9cm x 5 cm;  Surgeon: Fam Cardoso MD;  Location: [facility] OR;  Service: ENT     Social History:  reports that he has been smoking cigarettes. He has a 19.00 pack-year smoking history. He has never used smokeless tobacco. He reports previous alcohol use. He reports that he does not use drugs.    Family History: family history includes Diabetes in his mother; Hyperlipidemia in his mother; Hypertension in his father and mother; Stroke in his maternal grandfather.      Allergies:  No Known  Allergies  Medications:  Prior to Admission medications    Medication Sig Start Date End Date Taking? Authorizing Provider   albuterol (PROVENTIL) 2.5 MG/0.5ML nebulizer solution Take 2.5 mg by nebulization Every 6 (Six) Hours As Needed for Wheezing. 3/3/22  Yes Nella Flores DO   albuterol sulfate  (90 Base) MCG/ACT inhaler Inhale 2 puffs Every 4 (Four) Hours As Needed for Wheezing. 6/17/21  Yes Nella Flores DO   amLODIPine (NORVASC) 5 MG tablet Take 5 mg by mouth Daily. 7/4/21  Yes ProviderJanie MD   atomoxetine (STRATTERA) 60 MG capsule TAKE 1 CAPSULE BY MOUTH EVERY DAY 3/2/22  Yes Nella Flores DO   buprenorphine-naloxone (SUBOXONE) 8-2 MG per SL tablet Place 1 tablet under the tongue Daily.   Yes ProviderJanie MD   buPROPion XL (WELLBUTRIN XL) 150 MG 24 hr tablet TAKE 1 TABLET BY MOUTH EVERY DAY 3/17/22  Yes Nella Flores DO   clotrimazole-betamethasone (Lotrisone) 1-0.05 % cream Apply  topically to the appropriate area as directed 2 (Two) Times a Day. 2/8/21  Yes Nella Flores DO   [facility] Allergy Relief-D12 5-120 MG per 12 hr tablet TAKE 1 TABLET BY MOUTH 2 (TWO) TIMES A DAY AS NEEDED FOR ALLERGIES. 1/27/22  Yes Nella Flores DO   fluticasone (FLONASE) 50 MCG/ACT nasal spray 2 sprays into the nostril(s) as directed by provider Daily. 2/15/21  Yes Nella Flores DO   gabapentin (NEURONTIN) 800 MG tablet Take 1 tablet by mouth 4 (Four) Times a Day. 3/21/22  Yes Nella Flores DO   ibuprofen (ADVIL,MOTRIN) 800 MG tablet TAKE 1 TABLET BY MOUTH TWICE A DAY 12/2/21  Yes Nella Flores DO   lisinopril (PRINIVIL,ZESTRIL) 40 MG tablet TAKE 1 TABLET BY MOUTH EVERY DAY 3/22/22  Yes Nella Flores DO   Loratadine (CLARITIN PO) Take 10 mg by mouth Daily.   Yes Provider, MD Janie   mupirocin (BACTROBAN) 2 % ointment Apply 1 application topically to the appropriate area as directed 3 (Three) Times a Day. 1/19/22  Yes Nella Flores, DO  "  Omega-3 Fatty Acids (FISH OIL) 1000 MG capsule capsule Take 1,000 mg by mouth Daily With Breakfast.   Yes ProviderJanie MD   polyethylene glycol (GoLYTELY) 236 g solution Take as directed by office instructions. 6/22/21  Yes Sonia Guerrero APRN   tamsulosin (FLOMAX) 0.4 MG capsule 24 hr capsule TAKE 1 CAPSULE BY MOUTH EVERY DAY 1/27/22  Yes Nella Flores DO   traZODone (DESYREL) 300 MG tablet Take 300 mg by mouth Every Night.   Yes ProviderJanie MD   azithromycin (Zithromax Z-Asa) 250 MG tablet Take 2 tablets by mouth on day 1, then 1 tablet daily on days 2-5 1/20/22   Nella Flores DO   DULoxetine (CYMBALTA) 60 MG capsule Take 1 capsule by mouth Daily for 30 days. 1/25/22 2/24/22  Nella Flores DO   methylPREDNISolone (MEDROL) 4 MG dose pack Take as directed on package instructions. 1/20/22   Nella Flores DO       Objective     Vital Signs: /80 (BP Location: Right arm, Patient Position: Sitting, Cuff Size: Adult)   Pulse 91   Temp 97.7 °F (36.5 °C)   Resp 16   Ht 180.3 cm (71\")   Wt 130 kg (287 lb)   SpO2 95%   BMI 40.03 kg/m²   Physical Exam  Vitals reviewed.   Constitutional:       Appearance: Normal appearance.   HENT:      Head: Normocephalic and atraumatic.      Right Ear: External ear normal.      Left Ear: External ear normal.      Nose: Nose normal.   Eyes:      General: No scleral icterus.     Conjunctiva/sclera: Conjunctivae normal.   Cardiovascular:      Rate and Rhythm: Normal rate and regular rhythm.      Heart sounds: Normal heart sounds.   Pulmonary:      Effort: Pulmonary effort is normal.      Breath sounds: Wheezing present.   Musculoskeletal:         General: No swelling or tenderness.      Cervical back: Normal range of motion and neck supple.   Skin:     General: Skin is warm and dry.   Neurological:      General: No focal deficit present.      Mental Status: He is alert.      Cranial Nerves: No cranial nerve deficit.   Psychiatric: "         Mood and Affect: Mood normal.      Comments: Hyper       Patient's Body mass index is 40.03 kg/m². indicating that he is morbidly obese (BMI > 40 or > 35 with obesity - related health condition). Obesity-related health conditions include the following: hypertension. Obesity is unchanged. BMI is is above average; BMI management plan is completed. We discussed portion control and increasing exercise..      Results Reviewed:  Glucose   Date Value Ref Range Status   09/13/2021 96 65 - 99 mg/dL Final   02/18/2020 79 74 - 109 mg/dL Final     BUN   Date Value Ref Range Status   09/13/2021 20 6 - 24 mg/dL Final   02/18/2020 20 6 - 20 mg/dL Final     Creatinine   Date Value Ref Range Status   09/13/2021 0.79 0.76 - 1.27 mg/dL Final   02/18/2020 0.7 0.5 - 1.2 mg/dL Final     Sodium   Date Value Ref Range Status   09/13/2021 139 134 - 144 mmol/L Final   02/18/2020 140 136 - 145 mmol/L Final     Potassium   Date Value Ref Range Status   09/13/2021 4.8 3.5 - 5.2 mmol/L Final   02/18/2020 4.4 3.5 - 5.0 mmol/L Final     Chloride   Date Value Ref Range Status   09/13/2021 102 96 - 106 mmol/L Final   02/18/2020 102 98 - 111 mmol/L Final     CO2   Date Value Ref Range Status   02/18/2020 24 22 - 29 mmol/L Final     Total CO2   Date Value Ref Range Status   09/13/2021 25 20 - 29 mmol/L Final     Calcium   Date Value Ref Range Status   09/13/2021 9.0 8.7 - 10.2 mg/dL Final   02/18/2020 9.4 8.6 - 10.0 mg/dL Final     ALT (SGPT)   Date Value Ref Range Status   09/13/2021 14 0 - 44 IU/L Final   02/18/2020 8 5 - 41 U/L Final     AST (SGOT)   Date Value Ref Range Status   09/13/2021 13 0 - 40 IU/L Final   02/18/2020 12 5 - 40 U/L Final     WBC   Date Value Ref Range Status   09/13/2021 10.5 3.4 - 10.8 x10E3/uL Final   02/18/2020 7.5 4.8 - 10.8 K/uL Final     Hematocrit   Date Value Ref Range Status   09/13/2021 37.0 (L) 37.5 - 51.0 % Final   02/18/2020 39.5 (L) 42.0 - 52.0 % Final     Platelets   Date Value Ref Range Status    09/13/2021 251 150 - 450 x10E3/uL Final   02/18/2020 281 130 - 400 K/uL Final     Triglycerides   Date Value Ref Range Status   09/13/2021 217 (H) 0 - 149 mg/dL Final   02/18/2020 98 0 - 149 mg/dL Final     HDL Cholesterol   Date Value Ref Range Status   09/13/2021 39 (L) >39 mg/dL Final   02/18/2020 44 (L) 55 - 121 mg/dL Final     Comment:     VALUES>60 MG/DL ARE ASSOCIATED WITH A DECREASED RISK OF  ATHEROSCLEROTIC CARDIOVASCULAR DISEASE     LDL Cholesterol    Date Value Ref Range Status   02/18/2020 77 <100 mg/dL Final     Comment:     <100 MG/DL=OPITIMAL    100-129 MG/DL=DESIRABLE    130-159 MG/DL BORDERLINE=INCREASED RISK OF ATHEROSCLEROTIC  CARDIOVASCULAR DISEASE    > OR = 160 MG/DL=ASSOCIATED WITH AN INCREASE RISK OF  ATHEROSCLEROTIC CARDIOVASCULAR DISEASE     LDL Chol Calc (NI[facility])   Date Value Ref Range Status   09/13/2021 71 0 - 99 mg/dL Final       Assessment / Plan     Assessment/Plan:  1. Encounter for screening colonoscopy  - Ambulatory Referral to Gastroenterology    2. Dizziness  - meclizine (ANTIVERT) 25 MG tablet; Take 1 tablet by mouth 3 (Three) Times a Day As Needed for Dizziness.  Dispense: 60 tablet; Refill: 1    3. Sinus congestion  - azithromycin (Zithromax Z-Asa) 250 MG tablet; Take 2 tablets by mouth on day 1, then 1 tablet daily on days 2-5  Dispense: 6 tablet; Refill: 0  - guaifenesin-dextromethorphan (MUCINEX DM)  MG tablet sustained-release 12 hour tablet; Take 1 tablet by mouth 2 (Two) Times a Day As Needed (sinus congestion).  Dispense: 45 tablet; Refill: 1        Return in about 2 weeks (around 4/14/2022) for Recheck, Next scheduled follow up. unless patient needs to be seen sooner or acute issues arise.    Code Status: Full    I have discussed the patient results/orders and and plan/recommendation with them at today's visit.      Nella Flores, DO   03/31/2022

## 2022-04-04 DIAGNOSIS — F33.42 RECURRENT MAJOR DEPRESSIVE DISORDER, IN FULL REMISSION: ICD-10-CM

## 2022-04-04 RX ORDER — DULOXETIN HYDROCHLORIDE 60 MG/1
60 CAPSULE, DELAYED RELEASE ORAL DAILY
Qty: 90 CAPSULE | Refills: 3 | Status: SHIPPED | OUTPATIENT
Start: 2022-04-04 | End: 2022-09-30 | Stop reason: SDUPTHER

## 2022-04-04 NOTE — TELEPHONE ENCOUNTER
Dr. Flores patient     Rx Refill Note  Requested Prescriptions     Pending Prescriptions Disp Refills   • DULoxetine (CYMBALTA) 60 MG capsule 90 capsule 3     Sig: Take 1 capsule by mouth Daily for 30 days.   Med last filled: 1/25/22   Last office visit with prescribing clinician: 3/31/2022      Next office visit with prescribing clinician: 4/20/2022            Unique Coronado RN  04/04/22, 14:21 CDT

## 2022-04-04 NOTE — TELEPHONE ENCOUNTER
Caller: Raimundo Ramos    Relationship: Self    Best call back number: [phone]    Requested Prescriptions:   Requested Prescriptions     Pending Prescriptions Disp Refills   • DULoxetine (CYMBALTA) 60 MG capsule 90 capsule 3     Sig: Take 1 capsule by mouth Daily for 30 days.        Pharmacy where request should be sent: [facility]/PHARMACY #[id number] - [address] [phone] [facility] [phone] FX     Additional details provided by patient:  COMPLETELY OUT    Does the patient have less than a 3 day supply:  [x] Yes  [] No    Rivera Gifford Rep   04/04/22 12:40 CDT

## 2022-04-19 DIAGNOSIS — G89.29 CHRONIC BILATERAL LOW BACK PAIN WITH LEFT-SIDED SCIATICA: ICD-10-CM

## 2022-04-19 DIAGNOSIS — M54.42 CHRONIC BILATERAL LOW BACK PAIN WITH LEFT-SIDED SCIATICA: ICD-10-CM

## 2022-04-19 RX ORDER — GABAPENTIN 800 MG/1
800 TABLET ORAL 4 TIMES DAILY
Qty: 120 TABLET | Refills: 0 | Status: SHIPPED | OUTPATIENT
Start: 2022-04-19 | End: 2022-04-20 | Stop reason: SDUPTHER

## 2022-04-19 NOTE — TELEPHONE ENCOUNTER
Rx Refill Note  Requested Prescriptions     Pending Prescriptions Disp Refills   • gabapentin (NEURONTIN) 800 MG tablet 120 tablet 0     Sig: Take 1 tablet by mouth 4 (Four) Times a Day.      Last office visit with prescribing clinician: 3/31/2022      Next office visit with prescribing clinician: 4/20/2022            Sil Arredondo MA  04/19/22, 14:22 CDT\

## 2022-04-19 NOTE — TELEPHONE ENCOUNTER
Caller: Raimundo Ramos    Relationship: Self    Best call back number: [phone]    Requested Prescriptions:   Requested Prescriptions     Pending Prescriptions Disp Refills   • gabapentin (NEURONTIN) 800 MG tablet 120 tablet 0     Sig: Take 1 tablet by mouth 4 (Four) Times a Day.        Pharmacy where request should be sent: [facility]/PHARMACY #[id number] - [address] [phone] [facility] [phone] FX     Additional details provided by patient:     Does the patient have less than a 3 day supply:  [] Yes  [x] No    Rivera Aguillon Rep   04/19/22 09:56 CDT

## 2022-04-20 ENCOUNTER — OFFICE VISIT (OUTPATIENT)
Dept: INTERNAL MEDICINE | Facility: CLINIC | Age: 57
End: 2022-04-20

## 2022-04-20 VITALS
BODY MASS INDEX: 39.62 KG/M2 | OXYGEN SATURATION: 94 % | DIASTOLIC BLOOD PRESSURE: 84 MMHG | HEIGHT: 71 IN | HEART RATE: 86 BPM | SYSTOLIC BLOOD PRESSURE: 149 MMHG | WEIGHT: 283 LBS | TEMPERATURE: 97.2 F

## 2022-04-20 DIAGNOSIS — K42.9 UMBILICAL HERNIA WITHOUT OBSTRUCTION AND WITHOUT GANGRENE: ICD-10-CM

## 2022-04-20 DIAGNOSIS — M54.42 CHRONIC MIDLINE LOW BACK PAIN WITH LEFT-SIDED SCIATICA: ICD-10-CM

## 2022-04-20 DIAGNOSIS — M54.42 CHRONIC BILATERAL LOW BACK PAIN WITH LEFT-SIDED SCIATICA: ICD-10-CM

## 2022-04-20 DIAGNOSIS — G89.29 CHRONIC BILATERAL LOW BACK PAIN WITH LEFT-SIDED SCIATICA: ICD-10-CM

## 2022-04-20 DIAGNOSIS — I10 PRIMARY HYPERTENSION: ICD-10-CM

## 2022-04-20 DIAGNOSIS — F90.9 ADULT ADHD: ICD-10-CM

## 2022-04-20 DIAGNOSIS — Z12.11 COLON CANCER SCREENING: ICD-10-CM

## 2022-04-20 DIAGNOSIS — G47.00 INSOMNIA, UNSPECIFIED TYPE: ICD-10-CM

## 2022-04-20 DIAGNOSIS — G89.29 CHRONIC MIDLINE LOW BACK PAIN WITH LEFT-SIDED SCIATICA: ICD-10-CM

## 2022-04-20 DIAGNOSIS — Z79.899 LONG TERM USE OF DRUG: Primary | ICD-10-CM

## 2022-04-20 DIAGNOSIS — L98.9 SKIN LESION: ICD-10-CM

## 2022-04-20 DIAGNOSIS — L73.9 FOLLICULITIS: ICD-10-CM

## 2022-04-20 PROCEDURE — 99214 OFFICE O/P EST MOD 30 MIN: CPT | Performed by: INTERNAL MEDICINE

## 2022-04-20 RX ORDER — SULFAMETHOXAZOLE AND TRIMETHOPRIM 800; 160 MG/1; MG/1
1 TABLET ORAL 2 TIMES DAILY
Qty: 14 TABLET | Refills: 0 | Status: SHIPPED | OUTPATIENT
Start: 2022-04-20

## 2022-04-20 RX ORDER — QUETIAPINE FUMARATE 50 MG/1
50 TABLET, FILM COATED ORAL NIGHTLY
Qty: 30 TABLET | Refills: 1 | Status: SHIPPED | OUTPATIENT
Start: 2022-04-20 | End: 2022-06-28

## 2022-04-20 RX ORDER — ATOMOXETINE 60 MG/1
60 CAPSULE ORAL DAILY
Qty: 30 CAPSULE | Refills: 1 | Status: SHIPPED | OUTPATIENT
Start: 2022-04-20

## 2022-04-20 RX ORDER — GABAPENTIN 800 MG/1
800 TABLET ORAL 4 TIMES DAILY
Qty: 120 TABLET | Refills: 0 | Status: SHIPPED | OUTPATIENT
Start: 2022-04-20 | End: 2022-05-17 | Stop reason: SDUPTHER

## 2022-04-20 NOTE — PROGRESS NOTES
Subjective     Chief Complaint   Patient presents with   • Back Pain     3 mo fu         Back Pain  This is a chronic problem. The current episode started more than 1 year ago. The problem occurs 2 to 4 times per day. The problem is unchanged. The pain is present in the lumbar spine. The pain radiates to the left thigh. The pain is moderate. The pain is the same all the time. The symptoms are aggravated by position. Stiffness is present at night. Associated symptoms include numbness and tingling. Pertinent negatives include no bladder incontinence, bowel incontinence, chest pain, dysuria or fever. Risk factors include sedentary lifestyle. He has tried home exercises and analgesics for the symptoms. The treatment provided mild relief.     Patient has been having allergy problems.     Patient has been having difficulty sleeping. The Trazodone is not helping. He would like to try something else.     Rash on abdomen. 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     Patient's PMR from outside medical facility reviewed and noted.    Review of Systems   Constitutional: Negative for chills and fever.   HENT: Negative for congestion and rhinorrhea.    Respiratory: Negative for cough and shortness of breath.    Cardiovascular: Negative for chest pain and leg swelling.   Gastrointestinal: Negative for bowel incontinence, constipation and diarrhea.   Genitourinary: Negative for bladder incontinence, dysuria and hematuria.   Musculoskeletal: Positive for arthralgias and back pain.   Skin: Positive for rash.   Neurological: Positive for tingling and numbness. Negative for dizziness.      Otherwise complete ROS reviewed and negative except as mentioned in the HPI.    Past Medical History:   Past Medical History:   Diagnosis Date   • Anxiety    • Back pain    • Chronic pain    • Depression    • Elevated cholesterol    • Hypertension      Past Surgical History:  Past Surgical History:   Procedure Laterality Date   • WOUND CLOSURE  Right 5/28/2019    Procedure: Complex closure of open scalp wound avulsion defect 7x 4.5 cm with rotation/ advancement flap closure 9cm x 5 cm;  Surgeon: Fam Cardoso MD;  Location: [facility];  Service: ENT     Social History:  reports that he has been smoking cigarettes. He has a 19.00 pack-year smoking history. He has never used smokeless tobacco. He reports previous alcohol use. He reports that he does not use drugs.    Family History: family history includes Diabetes in his mother; Hyperlipidemia in his mother; Hypertension in his father and mother; Stroke in his maternal grandfather.      Allergies:  No Known Allergies  Medications:  Prior to Admission medications    Medication Sig Start Date End Date Taking? Authorizing Provider   albuterol (PROVENTIL) 2.5 MG/0.5ML nebulizer solution Take 2.5 mg by nebulization Every 6 (Six) Hours As Needed for Wheezing. 3/3/22  Yes Nella Flores DO   albuterol sulfate  (90 Base) MCG/ACT inhaler Inhale 2 puffs Every 4 (Four) Hours As Needed for Wheezing. 6/17/21  Yes Nella Flores DO   amLODIPine (NORVASC) 5 MG tablet Take 5 mg by mouth Daily. 7/4/21  Yes Janie Bueno MD   atomoxetine (STRATTERA) 60 MG capsule TAKE 1 CAPSULE BY MOUTH EVERY DAY 3/2/22  Yes Nella Flores DO   buprenorphine-naloxone (SUBOXONE) 8-2 MG per SL tablet Place 1 tablet under the tongue Daily.   Yes Janie Bueno MD   buPROPion XL (WELLBUTRIN XL) 150 MG 24 hr tablet TAKE 1 TABLET BY MOUTH EVERY DAY 3/17/22  Yes Nella Flores DO   clotrimazole-betamethasone (Lotrisone) 1-0.05 % cream Apply  topically to the appropriate area as directed 2 (Two) Times a Day. 2/8/21  Yes Nella Flores DO   [facility] Allergy Relief-D12 5-120 MG per 12 hr tablet TAKE 1 TABLET BY MOUTH 2 (TWO) TIMES A DAY AS NEEDED FOR ALLERGIES. 1/27/22  Yes Nella Flores DO   DULoxetine (CYMBALTA) 60 MG capsule Take 1 capsule by mouth Daily for 30 days. 4/4/22 5/4/22 Yes Bernal,  PEPITO [place]   fluticasone (FLONASE) 50 MCG/ACT nasal spray 2 sprays into the nostril(s) as directed by provider Daily. 2/15/21  Yes Nella Flores DO   gabapentin (NEURONTIN) 800 MG tablet Take 1 tablet by mouth 4 (Four) Times a Day. 4/19/22  Yes Nella Flores DO   guaifenesin-dextromethorphan (MUCINEX DM)  MG tablet sustained-release 12 hour tablet Take 1 tablet by mouth 2 (Two) Times a Day As Needed (sinus congestion). 3/31/22  Yes Nella Flores DO   ibuprofen (ADVIL,MOTRIN) 800 MG tablet TAKE 1 TABLET BY MOUTH TWICE A DAY 12/2/21  Yes Nella Flores DO   lisinopril (PRINIVIL,ZESTRIL) 40 MG tablet TAKE 1 TABLET BY MOUTH EVERY DAY 3/22/22  Yes Nella Flores DO   Loratadine (CLARITIN PO) Take 10 mg by mouth Daily.   Yes ProviderJanie MD   meclizine (ANTIVERT) 25 MG tablet Take 1 tablet by mouth 3 (Three) Times a Day As Needed for Dizziness. 3/31/22  Yes Nella Flores DO   mupirocin (BACTROBAN) 2 % ointment Apply 1 application topically to the appropriate area as directed 3 (Three) Times a Day. 1/19/22  Yes Nella Flores DO   Omega-3 Fatty Acids (FISH OIL) 1000 MG capsule capsule Take 1,000 mg by mouth Daily With Breakfast.   Yes Janie Bueno MD   polyethylene glycol (GoLYTELY) 236 g solution Take as directed by office instructions. 6/22/21  Yes Sonia Guerrero APRN   tamsulosin (FLOMAX) 0.4 MG capsule 24 hr capsule TAKE 1 CAPSULE BY MOUTH EVERY DAY 1/27/22  Yes Nella Flores DO   traZODone (DESYREL) 300 MG tablet Take 300 mg by mouth Every Night.   Yes Janie Bueno MD   azithromycin (Zithromax Z-Asa) 250 MG tablet Take 2 tablets by mouth on day 1, then 1 tablet daily on days 2-5 3/31/22 4/20/22  Nella Flores DO   methylPREDNISolone (MEDROL) 4 MG dose pack Take as directed on package instructions. 1/20/22 4/20/22  Nella Flores DO       Objective     Vital Signs: /84 (BP Location: Left arm, Patient  "Position: Sitting, Cuff Size: Large Adult)   Pulse 86   Temp 97.2 °F (36.2 °C) (Infrared)   Ht 180.3 cm (71\")   Wt 128 kg (283 lb)   SpO2 94%   BMI 39.47 kg/m²   Physical Exam  Vitals reviewed.   Constitutional:       Appearance: Normal appearance.   HENT:      Head: Normocephalic and atraumatic.      Right Ear: External ear normal.      Left Ear: External ear normal.      Nose: Nose normal.   Eyes:      General: No scleral icterus.     Conjunctiva/sclera: Conjunctivae normal.   Cardiovascular:      Rate and Rhythm: Normal rate and regular rhythm.      Heart sounds: Normal heart sounds.   Pulmonary:      Effort: Pulmonary effort is normal.      Breath sounds: Normal breath sounds.   Abdominal:      Comments: Umbilical hernia   Musculoskeletal:         General: No swelling or tenderness.      Cervical back: Normal range of motion and neck supple.   Skin:     General: Skin is warm and dry.      Findings: Erythema and rash present.      Comments: Pinpoint lesion. Slightly raised. Not patterned.    Neurological:      General: No focal deficit present.      Mental Status: He is alert.      Cranial Nerves: No cranial nerve deficit.   Psychiatric:         Mood and Affect: Mood normal.         Behavior: Behavior normal.       Patient's Body mass index is 39.47 kg/m². indicating that he is obese (BMI >30). Obesity-related health conditions include the following: hypertension. Obesity is unchanged. BMI is is above average; BMI management plan is completed. We discussed portion control and increasing exercise..      Results Reviewed:  Glucose   Date Value Ref Range Status   09/13/2021 96 65 - 99 mg/dL Final   02/18/2020 79 74 - 109 mg/dL Final     BUN   Date Value Ref Range Status   09/13/2021 20 6 - 24 mg/dL Final   02/18/2020 20 6 - 20 mg/dL Final     Creatinine   Date Value Ref Range Status   09/13/2021 0.79 0.76 - 1.27 mg/dL Final   02/18/2020 0.7 0.5 - 1.2 mg/dL Final     Sodium   Date Value Ref Range Status "   09/13/2021 139 134 - 144 mmol/L Final   02/18/2020 140 136 - 145 mmol/L Final     Potassium   Date Value Ref Range Status   09/13/2021 4.8 3.5 - 5.2 mmol/L Final   02/18/2020 4.4 3.5 - 5.0 mmol/L Final     Chloride   Date Value Ref Range Status   09/13/2021 102 96 - 106 mmol/L Final   02/18/2020 102 98 - 111 mmol/L Final     CO2   Date Value Ref Range Status   02/18/2020 24 22 - 29 mmol/L Final     Total CO2   Date Value Ref Range Status   09/13/2021 25 20 - 29 mmol/L Final     Calcium   Date Value Ref Range Status   09/13/2021 9.0 8.7 - 10.2 mg/dL Final   02/18/2020 9.4 8.6 - 10.0 mg/dL Final     ALT (SGPT)   Date Value Ref Range Status   09/13/2021 14 0 - 44 IU/L Final   02/18/2020 8 5 - 41 U/L Final     AST (SGOT)   Date Value Ref Range Status   09/13/2021 13 0 - 40 IU/L Final   02/18/2020 12 5 - 40 U/L Final     WBC   Date Value Ref Range Status   09/13/2021 10.5 3.4 - 10.8 x10E3/uL Final   02/18/2020 7.5 4.8 - 10.8 K/uL Final     Hematocrit   Date Value Ref Range Status   09/13/2021 37.0 (L) 37.5 - 51.0 % Final   02/18/2020 39.5 (L) 42.0 - 52.0 % Final     Platelets   Date Value Ref Range Status   09/13/2021 251 150 - 450 x10E3/uL Final   02/18/2020 281 130 - 400 K/uL Final     Triglycerides   Date Value Ref Range Status   09/13/2021 217 (H) 0 - 149 mg/dL Final   02/18/2020 98 0 - 149 mg/dL Final     HDL Cholesterol   Date Value Ref Range Status   09/13/2021 39 (L) >39 mg/dL Final   02/18/2020 44 (L) 55 - 121 mg/dL Final     Comment:     VALUES>60 MG/DL ARE ASSOCIATED WITH A DECREASED RISK OF  ATHEROSCLEROTIC CARDIOVASCULAR DISEASE     LDL Cholesterol    Date Value Ref Range Status   02/18/2020 77 <100 mg/dL Final     Comment:     <100 MG/DL=OPITIMAL    100-129 MG/DL=DESIRABLE    130-159 MG/DL BORDERLINE=INCREASED RISK OF ATHEROSCLEROTIC  CARDIOVASCULAR DISEASE    > OR = 160 MG/DL=ASSOCIATED WITH AN INCREASE RISK OF  ATHEROSCLEROTIC CARDIOVASCULAR DISEASE     LDL Chol Calc ([facility])   Date Value Ref Range Status    09/13/2021 71 0 - 99 mg/dL Final     Assessment / Plan     Assessment/Plan:  1. Long term use of drug  - Compliance Drug Analysis, Ur - Urine, Clean Catch; Future    2. Primary hypertension  - Elevated, but consistent for the patient.     3. Chronic bilateral low back pain with left-sided sciatic  - gabapentin (NEURONTIN) 800 MG tablet; Take 1 tablet by mouth 4 (Four) Times a Day.  Dispense: 120 tablet; Refill: 0    5. Adult ADHD  - atomoxetine (STRATTERA) 60 MG capsule; Take 1 capsule by mouth Daily.  Dispense: 30 capsule; Refill: 1    6. Insomnia, unspecified type  - QUEtiapine (SEROquel) 50 MG tablet; Take 1 tablet by mouth Every Night.  Dispense: 30 tablet; Refill: 1    7. Folliculitis  - Bactrim BID for 7 days  - Bactroban    8. Umbilical hernia  - Refer to general surgery    Cologuard order sent.     Return in about 3 months (around 7/20/2022) for Recheck, Next scheduled follow up. unless patient needs to be seen sooner or acute issues arise.    Code Status: Full    I have discussed the patient results/orders and and plan/recommendation with them at today's visit.      Nella Flores, DO   04/20/2022

## 2022-04-25 ENCOUNTER — PRIOR AUTHORIZATION (OUTPATIENT)
Dept: FAMILY MEDICINE CLINIC | Facility: CLINIC | Age: 57
End: 2022-04-25

## 2022-04-29 ENCOUNTER — PRIOR AUTHORIZATION (OUTPATIENT)
Dept: INTERNAL MEDICINE | Facility: CLINIC | Age: 57
End: 2022-04-29

## 2022-04-29 NOTE — TELEPHONE ENCOUNTER
Approved today  The request has been approved. The authorization is effective for a maximum of 12 fills from 04/29/2022 to 04/28/2023, as long as the member is enrolled in their current health plan. The request was reviewed and approved by a licensed clinical pharmacist. Request approved for up to 3 tablets per day. A written notification letter will follow with additional details.

## 2022-04-29 NOTE — TELEPHONE ENCOUNTER
Raimundo Ramos Key: MARI CRUZ Case ID: [id number] - Rx #: [id number] help? Call us at [phone]  Status  Sent to [facility]  Drug  QUEtiapine Fumarate 50MG tablets  Form  [employer] [place] Medicaid ePA Form 2017 NCPDP  Original Claim Info  75 TRANS FEE = 0.00PA REQUIRED: CALL [phone] FOR ASSISTANCE; RESUBMIT WITH QUALIFIED ICD-10 CODEROUTINE METABOLIC LAB RECOMMENDED FOR PEDIATRIC MEMBERS

## 2022-05-17 DIAGNOSIS — G89.29 CHRONIC BILATERAL LOW BACK PAIN WITH LEFT-SIDED SCIATICA: ICD-10-CM

## 2022-05-17 DIAGNOSIS — M54.42 CHRONIC BILATERAL LOW BACK PAIN WITH LEFT-SIDED SCIATICA: ICD-10-CM

## 2022-05-17 DIAGNOSIS — Z71.6 ENCOUNTER FOR TOBACCO USE CESSATION COUNSELING: ICD-10-CM

## 2022-05-17 RX ORDER — BUPROPION HYDROCHLORIDE 150 MG/1
TABLET ORAL
Qty: 30 TABLET | Refills: 1 | Status: SHIPPED | OUTPATIENT
Start: 2022-05-17 | End: 2022-07-14

## 2022-05-17 NOTE — TELEPHONE ENCOUNTER
Rx Refill Note  Requested Prescriptions     Pending Prescriptions Disp Refills   • buPROPion XL (WELLBUTRIN XL) 150 MG 24 hr tablet [Pharmacy Med Name: BUPROPION HCL  MG TABLET] 30 tablet 1     Sig: TAKE 1 TABLET BY MOUTH EVERY DAY   Med last filled:  3/17/22  Last office visit with prescribing clinician: 4/20/2022      Next office visit with prescribing clinician: 7/20/2022            Unique Coronado RN  05/17/22, 07:09 CDT

## 2022-05-17 NOTE — TELEPHONE ENCOUNTER
Caller: Raimundo Ramos    Relationship: Self    Best call back number:[phone]    Requested Prescriptions: gabapentin (NEURONTIN) 800 MG tablet  Requested Prescriptions      No prescriptions requested or ordered in this encounter        Pharmacy where request should be sent:      [facility]/pharmacy #[id number] - [place], [place] - [address] [facility] - [phone] [facility] [phone]   [phone]    Additional details provided by patient: WILL BE OUT TOMORROW    Does the patient have less than a 3 day supply:  [x] Yes  [] No    Rivera Leiva Rep   05/17/22 16:37 CDT

## 2022-05-17 NOTE — TELEPHONE ENCOUNTER
Rx Refill Note  Requested Prescriptions     Pending Prescriptions Disp Refills   • gabapentin (NEURONTIN) 800 MG tablet 120 tablet 0     Sig: Take 1 tablet by mouth 4 (Four) Times a Day.      Last office visit with prescribing clinician: 4/20/2022      Next office visit with prescribing clinician: 7/20/2022     Compliance Drug Analysis, Ur - Urine, Clean Catch (04/20/2022 09:10)         Ericka Dyson MA  05/17/22, 16:52 CDT

## 2022-05-18 RX ORDER — GABAPENTIN 800 MG/1
800 TABLET ORAL 4 TIMES DAILY
Qty: 120 TABLET | Refills: 0 | Status: SHIPPED | OUTPATIENT
Start: 2022-05-18 | End: 2022-06-16 | Stop reason: SDUPTHER

## 2022-06-03 DIAGNOSIS — M19.90 ARTHRITIS: ICD-10-CM

## 2022-06-03 RX ORDER — IBUPROFEN 800 MG/1
TABLET ORAL
Qty: 60 TABLET | Refills: 5 | Status: SHIPPED | OUTPATIENT
Start: 2022-06-03 | End: 2022-12-01

## 2022-06-03 NOTE — TELEPHONE ENCOUNTER
DR. BERRY PATIENT      Rx Refill Note  Requested Prescriptions     Pending Prescriptions Disp Refills   • ibuprofen (ADVIL,MOTRIN) 800 MG tablet [Pharmacy Med Name: IBUPROFEN 800 MG TABLET] 60 tablet 5     Sig: TAKE 1 TABLET BY MOUTH TWICE A DAY   Med last filled: 12/02/2021    Last office visit with prescribing clinician: 4/20/2022      Next office visit with prescribing clinician: 7/20/2022            Unique Coronado RN  06/03/22, 07:14 CDT

## 2022-06-16 ENCOUNTER — TELEPHONE (OUTPATIENT)
Dept: INTERNAL MEDICINE | Facility: CLINIC | Age: 57
End: 2022-06-16

## 2022-06-16 DIAGNOSIS — M54.42 CHRONIC BILATERAL LOW BACK PAIN WITH LEFT-SIDED SCIATICA: ICD-10-CM

## 2022-06-16 DIAGNOSIS — G89.29 CHRONIC BILATERAL LOW BACK PAIN WITH LEFT-SIDED SCIATICA: ICD-10-CM

## 2022-06-16 RX ORDER — GABAPENTIN 800 MG/1
800 TABLET ORAL 4 TIMES DAILY
Qty: 120 TABLET | Refills: 0 | Status: SHIPPED | OUTPATIENT
Start: 2022-06-16 | End: 2022-07-19 | Stop reason: SDUPTHER

## 2022-06-16 NOTE — TELEPHONE ENCOUNTER
Rx Refill Note  Requested Prescriptions     Pending Prescriptions Disp Refills   • gabapentin (NEURONTIN) 800 MG tablet 120 tablet 0     Sig: Take 1 tablet by mouth 4 (Four) Times a Day.      Last office visit with prescribing clinician: 4/20/2022      Next office visit with prescribing clinician: 7/20/2022     Compliance Drug Analysis, Ur - Urine, Clean Catch (04/20/2022 09:10)         Ericka Dyson MA  06/16/22, 09:39 CDT

## 2022-06-16 NOTE — TELEPHONE ENCOUNTER
Caller: Raimundo Ramos    Relationship: Self    Best call back number: [phone]    Requested Prescriptions:   Requested Prescriptions     Pending Prescriptions Disp Refills   • gabapentin (NEURONTIN) 800 MG tablet 120 tablet 0     Sig: Take 1 tablet by mouth 4 (Four) Times a Day.        Pharmacy where request should be sent: [facility]/PHARMACY #[id number] - [address] [phone] [facility] [phone] FX     Please advise when and if medication can be called in. If unable to be filled, please advise with callback at the phone number listed above.    Thank you,  Kael Enrique, PCT

## 2022-06-27 ENCOUNTER — TELEPHONE (OUTPATIENT)
Dept: INTERNAL MEDICINE | Facility: CLINIC | Age: 57
End: 2022-06-27

## 2022-06-27 DIAGNOSIS — G47.00 INSOMNIA, UNSPECIFIED TYPE: ICD-10-CM

## 2022-06-27 NOTE — TELEPHONE ENCOUNTER
Rx Refill Note  Requested Prescriptions     Pending Prescriptions Disp Refills   • QUEtiapine (SEROquel) 50 MG tablet [Pharmacy Med Name: QUETIAPINE FUMARATE 50 MG TAB] 30 tablet 1     Sig: TAKE 1 TABLET BY MOUTH EVERY DAY AT NIGHT      Last office visit with prescribing clinician: 4/20/2022      Next office visit with prescribing clinician: 7/20/2022            Unique Coronado RN  06/27/22, 15:54 CDT

## 2022-06-27 NOTE — TELEPHONE ENCOUNTER
Caller: Jagdish Ramoser    Relationship: Self    Best call back number: [phone]    Requested Prescriptions:   Requested Prescriptions     Pending Prescriptions Disp Refills   • QUEtiapine (SEROquel) 50 MG tablet [Pharmacy Med Name: QUETIAPINE FUMARATE 50 MG TAB] 30 tablet 1     Sig: TAKE 1 TABLET BY MOUTH EVERY DAY AT NIGHT        Pharmacy where request should be sent: [facility]/PHARMACY #[id number] - [address] [phone] [facility] [phone] FX     Additional details provided by patient: PATIENT CALLED BACK IN TO CHECK ON THE STATUS OF THIS REFILL. PATIENT IS TOTALLY OUT    Does the patient have less than a 3 day supply:  [x] Yes  [] No    Rivera Leiva Rep   06/27/22 16:43 CDT

## 2022-06-28 RX ORDER — QUETIAPINE FUMARATE 50 MG/1
TABLET, FILM COATED ORAL
Qty: 30 TABLET | Refills: 1 | Status: SHIPPED | OUTPATIENT
Start: 2022-06-28 | End: 2022-07-20

## 2022-07-03 DIAGNOSIS — R35.0 URINARY FREQUENCY: ICD-10-CM

## 2022-07-05 RX ORDER — TAMSULOSIN HYDROCHLORIDE 0.4 MG/1
CAPSULE ORAL
Qty: 30 CAPSULE | Refills: 5 | Status: SHIPPED | OUTPATIENT
Start: 2022-07-05 | End: 2023-01-06

## 2022-07-05 NOTE — TELEPHONE ENCOUNTER
Rx Refill Note  Requested Prescriptions     Pending Prescriptions Disp Refills   • tamsulosin (FLOMAX) 0.4 MG capsule 24 hr capsule [Pharmacy Med Name: TAMSULOSIN HCL 0.4 MG CAPSULE] 30 capsule 5     Sig: TAKE 1 CAPSULE BY MOUTH EVERY DAY      Last office visit with prescribing clinician: 4/20/2022      Next office visit with prescribing clinician: 7/20/2022            Ericka Dyson MA  07/05/22, 08:36 CDT

## 2022-07-11 ENCOUNTER — TELEPHONE (OUTPATIENT)
Dept: INTERNAL MEDICINE | Facility: CLINIC | Age: 57
End: 2022-07-11

## 2022-07-11 DIAGNOSIS — F33.42 RECURRENT MAJOR DEPRESSIVE DISORDER, IN FULL REMISSION: ICD-10-CM

## 2022-07-11 RX ORDER — DULOXETIN HYDROCHLORIDE 60 MG/1
60 CAPSULE, DELAYED RELEASE ORAL DAILY
Qty: 90 CAPSULE | Refills: 3 | Status: CANCELLED | OUTPATIENT
Start: 2022-07-11 | End: 2022-08-10

## 2022-07-11 NOTE — TELEPHONE ENCOUNTER
Caller: Rachel Raimundo    Relationship: Self    Best call back number: [phone]    Requested Prescriptions:   Requested Prescriptions     Pending Prescriptions Disp Refills   • DULoxetine (CYMBALTA) 60 MG capsule 90 capsule 3     Sig: Take 1 capsule by mouth Daily for 30 days.        Pharmacy where request should be sent: [facility]/PHARMACY #[id number] - [place] [phone] [facility] [phone] FX     Please advise when and if medication can be called in. If unable to be filled, please advise with callback at the phone number listed above.    Thank you,  Kael Enrique, PCT

## 2022-07-14 DIAGNOSIS — Z71.6 ENCOUNTER FOR TOBACCO USE CESSATION COUNSELING: ICD-10-CM

## 2022-07-14 RX ORDER — BUPROPION HYDROCHLORIDE 150 MG/1
TABLET ORAL
Qty: 30 TABLET | Refills: 1 | Status: SHIPPED | OUTPATIENT
Start: 2022-07-14 | End: 2022-09-15 | Stop reason: SDUPTHER

## 2022-07-14 NOTE — TELEPHONE ENCOUNTER
Dr. Flores patient     Rx Refill Note  Requested Prescriptions     Pending Prescriptions Disp Refills   • buPROPion XL (WELLBUTRIN XL) 150 MG 24 hr tablet [Pharmacy Med Name: BUPROPION HCL  MG TABLET] 30 tablet 1     Sig: TAKE 1 TABLET BY MOUTH EVERY DAY   Med last filled:  5/17/22  Last office visit with prescribing clinician: 4/20/2022      Next office visit with prescribing clinician: 7/20/2022            Unique Coronado RN  07/14/22, 07:36 CDT

## 2022-07-19 DIAGNOSIS — G89.29 CHRONIC BILATERAL LOW BACK PAIN WITH LEFT-SIDED SCIATICA: ICD-10-CM

## 2022-07-19 DIAGNOSIS — M54.42 CHRONIC BILATERAL LOW BACK PAIN WITH LEFT-SIDED SCIATICA: ICD-10-CM

## 2022-07-19 RX ORDER — GABAPENTIN 800 MG/1
800 TABLET ORAL 4 TIMES DAILY
Qty: 120 TABLET | Refills: 0 | Status: SHIPPED | OUTPATIENT
Start: 2022-07-19 | End: 2022-08-19 | Stop reason: SDUPTHER

## 2022-07-19 NOTE — TELEPHONE ENCOUNTER
Caller: Raimundo Ramos    Relationship: Self    Best call back number:  [phone] (H)     Requested Prescriptions:   Requested Prescriptions     Pending Prescriptions Disp Refills   • gabapentin (NEURONTIN) 800 MG tablet 120 tablet 0     Sig: Take 1 tablet by mouth 4 (Four) Times a Day.        Pharmacy where request should be sent: [facility]/PHARMACY #[id number] - [place], [place] - [address] [place] [address] [phone] [facility] [phone] FX     Additional details provided by patient: completely out     Does the patient have less than a 3 day supply:  [x] Yes  [] No    Rivera Sams Rep   07/19/22 13:24 CDT

## 2022-07-20 ENCOUNTER — TELEPHONE (OUTPATIENT)
Dept: INTERNAL MEDICINE | Facility: CLINIC | Age: 57
End: 2022-07-20

## 2022-07-20 ENCOUNTER — OFFICE VISIT (OUTPATIENT)
Dept: INTERNAL MEDICINE | Facility: CLINIC | Age: 57
End: 2022-07-20

## 2022-07-20 VITALS
DIASTOLIC BLOOD PRESSURE: 84 MMHG | HEART RATE: 96 BPM | SYSTOLIC BLOOD PRESSURE: 165 MMHG | BODY MASS INDEX: 39.98 KG/M2 | OXYGEN SATURATION: 95 % | WEIGHT: 285.6 LBS | HEIGHT: 71 IN | TEMPERATURE: 98.5 F

## 2022-07-20 DIAGNOSIS — Z79.899 LONG TERM USE OF DRUG: ICD-10-CM

## 2022-07-20 DIAGNOSIS — M62.838 MUSCLE SPASMS OF NECK: ICD-10-CM

## 2022-07-20 DIAGNOSIS — Z12.5 PROSTATE CANCER SCREENING: ICD-10-CM

## 2022-07-20 DIAGNOSIS — I10 ESSENTIAL HYPERTENSION: ICD-10-CM

## 2022-07-20 DIAGNOSIS — G47.00 INSOMNIA, UNSPECIFIED TYPE: Primary | ICD-10-CM

## 2022-07-20 PROCEDURE — 99214 OFFICE O/P EST MOD 30 MIN: CPT | Performed by: INTERNAL MEDICINE

## 2022-07-20 RX ORDER — QUETIAPINE FUMARATE 100 MG/1
100 TABLET, FILM COATED ORAL NIGHTLY
Qty: 90 TABLET | Refills: 1 | Status: SHIPPED | OUTPATIENT
Start: 2022-07-20 | End: 2022-08-23 | Stop reason: SDUPTHER

## 2022-07-20 RX ORDER — CYCLOBENZAPRINE HCL 10 MG
10 TABLET ORAL 3 TIMES DAILY PRN
Qty: 60 TABLET | Refills: 1 | Status: SHIPPED | OUTPATIENT
Start: 2022-07-20 | End: 2022-08-23

## 2022-07-20 NOTE — PROGRESS NOTES
Subjective     Chief Complaint   Patient presents with   • Follow-up     3 month       Hypertension  This is a chronic problem. The current episode started more than 1 year ago. The problem is unchanged. The problem is uncontrolled. Associated symptoms include neck pain. Pertinent negatives include no chest pain or shortness of breath. There are no associated agents to hypertension. Risk factors for coronary artery disease include male gender and obesity. Current antihypertension treatment includes calcium channel blockers and ACE inhibitors. The current treatment provides mild improvement. There are no compliance problems.      Patient has been having sleep issues and muscle spasms in the neck at night.     Patient's PMR from outside medical facility reviewed and noted.    Review of Systems   Constitutional: Negative for chills and fever.   HENT: Negative for congestion and rhinorrhea.    Respiratory: Negative for cough and shortness of breath.    Cardiovascular: Negative for chest pain and leg swelling.   Gastrointestinal: Negative for constipation and diarrhea.   Genitourinary: Negative for dysuria and hematuria.   Musculoskeletal: Positive for arthralgias, back pain and neck pain.   Psychiatric/Behavioral: Positive for sleep disturbance. Negative for dysphoric mood.      Otherwise complete ROS reviewed and negative except as mentioned in the HPI.    Past Medical History:   Past Medical History:   Diagnosis Date   • Anxiety    • Back pain    • Chronic pain    • Depression    • Elevated cholesterol    • Hypertension      Past Surgical History:  Past Surgical History:   Procedure Laterality Date   • WOUND CLOSURE Right 5/28/2019    Procedure: Complex closure of open scalp wound avulsion defect 7x 4.5 cm with rotation/ advancement flap closure 9cm x 5 cm;  Surgeon: Fam Cardoso MD;  Location: [facility];  Service: ENT     Social History:  reports that he has been smoking cigarettes. He has a 19.00  pack-year smoking history. He has never used smokeless tobacco. He reports previous alcohol use. He reports that he does not use drugs.    Family History: family history includes Diabetes in his mother; Hyperlipidemia in his mother; Hypertension in his father and mother; Stroke in his maternal grandfather.       Allergies:  No Known Allergies  Medications:  Prior to Admission medications    Medication Sig Start Date End Date Taking? Authorizing Provider   ibuprofen (ADVIL,MOTRIN) 800 MG tablet TAKE 1 TABLET BY MOUTH TWICE A DAY 6/3/22   Sonia Bernal APRN   tamsulosin (FLOMAX) 0.4 MG capsule 24 hr capsule TAKE 1 CAPSULE BY MOUTH EVERY DAY 7/5/22   Nella Flores DO   albuterol (PROVENTIL) 2.5 MG/0.5ML nebulizer solution Take 2.5 mg by nebulization Every 6 (Six) Hours As Needed for Wheezing. 3/3/22   Nella Flores DO   albuterol sulfate  (90 Base) MCG/ACT inhaler Inhale 2 puffs Every 4 (Four) Hours As Needed for Wheezing. 6/17/21   Nella Flores DO   amLODIPine (NORVASC) 5 MG tablet Take 5 mg by mouth Daily. 7/4/21   ProviderJanie MD   atomoxetine (STRATTERA) 60 MG capsule Take 1 capsule by mouth Daily. 4/20/22   Nella Flores DO   buprenorphine-naloxone (SUBOXONE) 8-2 MG per SL tablet Place 1 tablet under the tongue Daily.    ProviderJanie MD   buPROPion XL (WELLBUTRIN XL) 150 MG 24 hr tablet TAKE 1 TABLET BY MOUTH EVERY DAY 7/14/22   Gladys Tao APRN   clotrimazole-betamethasone (Lotrisone) 1-0.05 % cream Apply  topically to the appropriate area as directed 2 (Two) Times a Day. 2/8/21   Nella Flores DO   [facility] Allergy Relief-D12 5-120 MG per 12 hr tablet TAKE 1 TABLET BY MOUTH 2 (TWO) TIMES A DAY AS NEEDED FOR ALLERGIES. 1/27/22   Nella Flores DO   DULoxetine (CYMBALTA) 60 MG capsule Take 1 capsule by mouth Daily for 30 days. 4/4/22 5/4/22  Sonia Bernal APRN   fluticasone (FLONASE) 50 MCG/ACT nasal spray 2 sprays into the  "nostril(s) as directed by provider Daily. 2/15/21   Nella Flores DO   gabapentin (NEURONTIN) 800 MG tablet Take 1 tablet by mouth 4 (Four) Times a Day. 7/19/22   Nella Flores DO   guaifenesin-dextromethorphan (MUCINEX DM)  MG tablet sustained-release 12 hour tablet Take 1 tablet by mouth 2 (Two) Times a Day As Needed (sinus congestion). 3/31/22   Nella Flores DO   lisinopril (PRINIVIL,ZESTRIL) 40 MG tablet TAKE 1 TABLET BY MOUTH EVERY DAY 3/22/22   Nella Flores DO   Loratadine (CLARITIN PO) Take 10 mg by mouth Daily.    ProviderJanie MD   meclizine (ANTIVERT) 25 MG tablet Take 1 tablet by mouth 3 (Three) Times a Day As Needed for Dizziness. 3/31/22   Nella Flores DO   mupirocin (BACTROBAN) 2 % ointment Apply 1 application topically to the appropriate area as directed 3 (Three) Times a Day. 4/20/22   Nella Flores DO   mupirocin (BACTROBAN) 2 % ointment Apply 1 application topically to the appropriate area as directed 3 (Three) Times a Day. 4/20/22   Nella Flores DO   Omega-3 Fatty Acids (FISH OIL) 1000 MG capsule capsule Take 1,000 mg by mouth Daily With Breakfast.    Provider, MD Janie   polyethylene glycol (GoLYTELY) 236 g solution Take as directed by office instructions. 6/22/21   Sonia Guerrero APRN   QUEtiapine (SEROquel) 50 MG tablet TAKE 1 TABLET BY MOUTH EVERY DAY AT NIGHT 6/28/22   Nella Flores DO   sulfamethoxazole-trimethoprim (Bactrim DS) 800-160 MG per tablet Take 1 tablet by mouth 2 (Two) Times a Day. 4/20/22   Nella Flores DO       Objective     Vital Signs: /84 (BP Location: Right arm, Patient Position: Sitting, Cuff Size: Adult)   Pulse 96   Temp 98.5 °F (36.9 °C) (Temporal)   Ht 180.3 cm (71\")   Wt 130 kg (285 lb 9.6 oz)   SpO2 95%   BMI 39.83 kg/m²   Physical Exam  Vitals reviewed.   Constitutional:       Appearance: Normal appearance.   HENT:      Head: Normocephalic and atraumatic.      Right " Ear: External ear normal.      Left Ear: External ear normal.      Nose: Nose normal.   Eyes:      General: No scleral icterus.     Conjunctiva/sclera: Conjunctivae normal.   Cardiovascular:      Rate and Rhythm: Normal rate and regular rhythm.      Heart sounds: Normal heart sounds.   Pulmonary:      Effort: Pulmonary effort is normal.      Breath sounds: Normal breath sounds.   Musculoskeletal:         General: No swelling or tenderness.      Cervical back: Normal range of motion and neck supple.   Skin:     General: Skin is warm and dry.   Neurological:      General: No focal deficit present.      Mental Status: He is alert.      Cranial Nerves: No cranial nerve deficit.   Psychiatric:         Mood and Affect: Mood normal.         Behavior: Behavior normal.       Class 2 Severe Obesity (BMI >=35 and <=39.9). Obesity-related health conditions include the following: hypertension. Obesity is unchanged. BMI is is above average; BMI management plan is completed. We discussed portion control and increasing exercise.      Results Reviewed:  Glucose   Date Value Ref Range Status   09/13/2021 96 65 - 99 mg/dL Final   02/18/2020 79 74 - 109 mg/dL Final     BUN   Date Value Ref Range Status   09/13/2021 20 6 - 24 mg/dL Final   02/18/2020 20 6 - 20 mg/dL Final     Creatinine   Date Value Ref Range Status   09/13/2021 0.79 0.76 - 1.27 mg/dL Final   02/18/2020 0.7 0.5 - 1.2 mg/dL Final     Sodium   Date Value Ref Range Status   09/13/2021 139 134 - 144 mmol/L Final   02/18/2020 140 136 - 145 mmol/L Final     Potassium   Date Value Ref Range Status   09/13/2021 4.8 3.5 - 5.2 mmol/L Final   02/18/2020 4.4 3.5 - 5.0 mmol/L Final     Chloride   Date Value Ref Range Status   09/13/2021 102 96 - 106 mmol/L Final   02/18/2020 102 98 - 111 mmol/L Final     CO2   Date Value Ref Range Status   02/18/2020 24 22 - 29 mmol/L Final     Total CO2   Date Value Ref Range Status   09/13/2021 25 20 - 29 mmol/L Final     Calcium   Date Value Ref  Range Status   09/13/2021 9.0 8.7 - 10.2 mg/dL Final   02/18/2020 9.4 8.6 - 10.0 mg/dL Final     ALT (SGPT)   Date Value Ref Range Status   09/13/2021 14 0 - 44 IU/L Final   02/18/2020 8 5 - 41 U/L Final     AST (SGOT)   Date Value Ref Range Status   09/13/2021 13 0 - 40 IU/L Final   02/18/2020 12 5 - 40 U/L Final     WBC   Date Value Ref Range Status   09/13/2021 10.5 3.4 - 10.8 x10E3/uL Final   02/18/2020 7.5 4.8 - 10.8 K/uL Final     Hematocrit   Date Value Ref Range Status   09/13/2021 37.0 (L) 37.5 - 51.0 % Final   02/18/2020 39.5 (L) 42.0 - 52.0 % Final     Platelets   Date Value Ref Range Status   09/13/2021 251 150 - 450 x10E3/uL Final   02/18/2020 281 130 - 400 K/uL Final     Triglycerides   Date Value Ref Range Status   09/13/2021 217 (H) 0 - 149 mg/dL Final   02/18/2020 98 0 - 149 mg/dL Final     HDL Cholesterol   Date Value Ref Range Status   09/13/2021 39 (L) >39 mg/dL Final   02/18/2020 44 (L) 55 - 121 mg/dL Final     Comment:     VALUES>60 MG/DL ARE ASSOCIATED WITH A DECREASED RISK OF  ATHEROSCLEROTIC CARDIOVASCULAR DISEASE     LDL Cholesterol    Date Value Ref Range Status   02/18/2020 77 <100 mg/dL Final     Comment:     <100 MG/DL=OPITIMAL    100-129 MG/DL=DESIRABLE    130-159 MG/DL BORDERLINE=INCREASED RISK OF ATHEROSCLEROTIC  CARDIOVASCULAR DISEASE    > OR = 160 MG/DL=ASSOCIATED WITH AN INCREASE RISK OF  ATHEROSCLEROTIC CARDIOVASCULAR DISEASE     LDL Chol Calc (NIH)   Date Value Ref Range Status   09/13/2021 71 0 - 99 mg/dL Final       Assessment / Plan     Assessment/Plan:  1. Insomnia, unspecified type  - QUEtiapine (SEROquel) 100 MG tablet; Take 1 tablet by mouth Every Night.  Dispense: 90 tablet; Refill: 1    2. Muscle spasms of neck  - cyclobenzaprine (FLEXERIL) 10 MG tablet; Take 1 tablet by mouth 3 (Three) Times a Day As Needed for Muscle Spasms.  Dispense: 60 tablet; Refill: 1    3. Prostate cancer screening  - PSA Screen; Future    4. Essential hypertension  - CBC & Differential;  Future  - Comprehensive Metabolic Panel; Future  - TSH; Future  - T4, free; Future  - Lipid Panel; Future    5. Chronic back pain  - Patient stable on Neurontin and refills are complete.       Return in about 3 months (around 10/20/2022) for Recheck, Next scheduled follow up. unless patient needs to be seen sooner or acute issues arise.    Code Status: Full    I have discussed the patient results/orders and and plan/recommendation with them at today's visit.      Nella Flores, DO   07/20/2022

## 2022-07-20 NOTE — TELEPHONE ENCOUNTER
Pt called stating that he missed a phone call from someone at our office. Please return his call if one of you attempted to contact him. Thanks!

## 2022-07-20 NOTE — TELEPHONE ENCOUNTER
Called pt informed him that no one here at our office has called,   he said thanks for calling back.

## 2022-07-23 LAB — DRUGS UR: NORMAL

## 2022-08-19 DIAGNOSIS — M54.42 CHRONIC BILATERAL LOW BACK PAIN WITH LEFT-SIDED SCIATICA: ICD-10-CM

## 2022-08-19 DIAGNOSIS — G89.29 CHRONIC BILATERAL LOW BACK PAIN WITH LEFT-SIDED SCIATICA: ICD-10-CM

## 2022-08-19 RX ORDER — GABAPENTIN 800 MG/1
800 TABLET ORAL 4 TIMES DAILY
Qty: 120 TABLET | Refills: 0 | Status: SHIPPED | OUTPATIENT
Start: 2022-08-19 | End: 2022-09-19 | Stop reason: SDUPTHER

## 2022-08-19 NOTE — TELEPHONE ENCOUNTER
Rx Refill Note  Requested Prescriptions      No prescriptions requested or ordered in this encounter      Last office visit with prescribing clinician: 7/20/2022      Next office visit with prescribing clinician: 8/23/2022        Compliance Drug Analysis, Ur - Urine, Clean Catch (07/20/2022 09:57)      Radha Parker CMA  08/19/22, 07:11 CDT

## 2022-08-23 ENCOUNTER — OFFICE VISIT (OUTPATIENT)
Dept: INTERNAL MEDICINE | Facility: CLINIC | Age: 57
End: 2022-08-23

## 2022-08-23 VITALS
WEIGHT: 284 LBS | HEART RATE: 73 BPM | OXYGEN SATURATION: 98 % | DIASTOLIC BLOOD PRESSURE: 84 MMHG | BODY MASS INDEX: 39.76 KG/M2 | HEIGHT: 71 IN | SYSTOLIC BLOOD PRESSURE: 133 MMHG | TEMPERATURE: 97.7 F

## 2022-08-23 DIAGNOSIS — I10 PRIMARY HYPERTENSION: ICD-10-CM

## 2022-08-23 DIAGNOSIS — R07.9 CHEST PAIN, UNSPECIFIED TYPE: Primary | ICD-10-CM

## 2022-08-23 DIAGNOSIS — M54.40 CHRONIC LOW BACK PAIN WITH SCIATICA, SCIATICA LATERALITY UNSPECIFIED, UNSPECIFIED BACK PAIN LATERALITY: ICD-10-CM

## 2022-08-23 DIAGNOSIS — G47.00 INSOMNIA, UNSPECIFIED TYPE: ICD-10-CM

## 2022-08-23 DIAGNOSIS — G89.29 CHRONIC LOW BACK PAIN WITH SCIATICA, SCIATICA LATERALITY UNSPECIFIED, UNSPECIFIED BACK PAIN LATERALITY: ICD-10-CM

## 2022-08-23 PROCEDURE — 93000 ELECTROCARDIOGRAM COMPLETE: CPT | Performed by: INTERNAL MEDICINE

## 2022-08-23 PROCEDURE — 99214 OFFICE O/P EST MOD 30 MIN: CPT | Performed by: INTERNAL MEDICINE

## 2022-08-23 RX ORDER — QUETIAPINE FUMARATE 100 MG/1
100 TABLET, FILM COATED ORAL NIGHTLY
Qty: 90 TABLET | Refills: 1 | Status: SHIPPED | OUTPATIENT
Start: 2022-08-23 | End: 2022-10-31 | Stop reason: SDUPTHER

## 2022-08-23 NOTE — PROGRESS NOTES
Subjective     Chief Complaint   Patient presents with   • Back Pain   • Hypertension       Back Pain  This is a chronic problem. The current episode started more than 1 year ago. The problem occurs 2 to 4 times per day. The problem is unchanged. The pain is moderate. The symptoms are aggravated by position. Associated symptoms include chest pain. Pertinent negatives include no dysuria or fever. The treatment provided moderate relief.   Hypertension  This is a chronic problem. The current episode started more than 1 year ago. The problem is unchanged. The problem is controlled. Associated symptoms include chest pain. Pertinent negatives include no shortness of breath. Risk factors for coronary artery disease include male gender, obesity and dyslipidemia. The current treatment provides moderate improvement. There are no compliance problems.      Patient states that a couple days ago he had anxiety with chest pain    Patient states that sleeping medication is working for him.   He is unable to tolerate the muscle relaxer because is makes him feel groggy.    Patient's PMR from outside medical facility reviewed and noted.    Review of Systems   Constitutional: Negative for chills and fever.   HENT: Negative for congestion and rhinorrhea.    Respiratory: Negative for cough and shortness of breath.    Cardiovascular: Positive for chest pain. Negative for leg swelling.   Gastrointestinal: Negative for constipation and diarrhea.   Genitourinary: Negative for dysuria and hematuria.   Musculoskeletal: Positive for arthralgias and back pain.      Otherwise complete ROS reviewed and negative except as mentioned in the HPI.    Past Medical History:   Past Medical History:   Diagnosis Date   • Anxiety    • Back pain    • Chronic pain    • Depression    • Elevated cholesterol    • Hypertension      Past Surgical History:  Past Surgical History:   Procedure Laterality Date   • WOUND CLOSURE Right 5/28/2019    Procedure:  Complex closure of open scalp wound avulsion defect 7x 4.5 cm with rotation/ advancement flap closure 9cm x 5 cm;  Surgeon: Fam Cardoso MD;  Location: [facility];  Service: ENT     Social History:  reports that he has been smoking cigarettes. He has a 19.00 pack-year smoking history. He has never used smokeless tobacco. He reports previous alcohol use. He reports that he does not use drugs.    Family History: family history includes Diabetes in his mother; Hyperlipidemia in his mother; Hypertension in his father and mother; Stroke in his maternal grandfather.      Allergies:  No Known Allergies  Medications:  Prior to Admission medications    Medication Sig Start Date End Date Taking? Authorizing Provider   albuterol (PROVENTIL) 2.5 MG/0.5ML nebulizer solution Take 2.5 mg by nebulization Every 6 (Six) Hours As Needed for Wheezing. 3/3/22  Yes Nella Flores DO   albuterol sulfate  (90 Base) MCG/ACT inhaler Inhale 2 puffs Every 4 (Four) Hours As Needed for Wheezing. 6/17/21  Yes Nella Flores DO   amLODIPine (NORVASC) 5 MG tablet Take 5 mg by mouth Daily. 7/4/21  Yes Janie Bueno MD   atomoxetine (STRATTERA) 60 MG capsule Take 1 capsule by mouth Daily. 4/20/22  Yes Nella Flores DO   buprenorphine-naloxone (SUBOXONE) 8-2 MG per SL tablet Place 1 tablet under the tongue Daily.   Yes ProviderJanie MD   buPROPion XL (WELLBUTRIN XL) 150 MG 24 hr tablet TAKE 1 TABLET BY MOUTH EVERY DAY 7/14/22  Yes Gladys Tao APRN   clotrimazole-betamethasone (Lotrisone) 1-0.05 % cream Apply  topically to the appropriate area as directed 2 (Two) Times a Day. 2/8/21  Yes Nella Flores DO   [facility] Allergy Relief-D12 5-120 MG per 12 hr tablet TAKE 1 TABLET BY MOUTH 2 (TWO) TIMES A DAY AS NEEDED FOR ALLERGIES. 1/27/22  Yes Nella Flores DO   cyclobenzaprine (FLEXERIL) 10 MG tablet Take 1 tablet by mouth 3 (Three) Times a Day As Needed for Muscle Spasms. 7/20/22  Yes Nella Flores  DO Janeth   fluticasone (FLONASE) 50 MCG/ACT nasal spray 2 sprays into the nostril(s) as directed by provider Daily. 2/15/21  Yes Nella Flores DO   gabapentin (NEURONTIN) 800 MG tablet Take 1 tablet by mouth 4 (Four) Times a Day. 8/19/22  Yes Nella Flores DO   guaifenesin-dextromethorphan (MUCINEX DM)  MG tablet sustained-release 12 hour tablet Take 1 tablet by mouth 2 (Two) Times a Day As Needed (sinus congestion). 3/31/22  Yes Nella Flores DO   ibuprofen (ADVIL,MOTRIN) 800 MG tablet TAKE 1 TABLET BY MOUTH TWICE A DAY 6/3/22  Yes Sonia Bernal APRN   lisinopril (PRINIVIL,ZESTRIL) 40 MG tablet TAKE 1 TABLET BY MOUTH EVERY DAY 3/22/22  Yes Nella Flores DO   Loratadine (CLARITIN PO) Take 10 mg by mouth Daily.   Yes ProviderJanie MD   meclizine (ANTIVERT) 25 MG tablet Take 1 tablet by mouth 3 (Three) Times a Day As Needed for Dizziness. 3/31/22  Yes Nella Flores DO   mupirocin (BACTROBAN) 2 % ointment Apply 1 application topically to the appropriate area as directed 3 (Three) Times a Day. 4/20/22  Yes Nella Flores DO   mupirocin (BACTROBAN) 2 % ointment Apply 1 application topically to the appropriate area as directed 3 (Three) Times a Day. 4/20/22  Yes Nella Flores DO   Omega-3 Fatty Acids (FISH OIL) 1000 MG capsule capsule Take 1,000 mg by mouth Daily With Breakfast.   Yes ProviderJanie MD   polyethylene glycol (GoLYTELY) 236 g solution Take as directed by office instructions. 6/22/21  Yes Sonia Guerrero APRN   QUEtiapine (SEROquel) 100 MG tablet Take 1 tablet by mouth Every Night. 7/20/22  Yes Nella Flores DO   sulfamethoxazole-trimethoprim (Bactrim DS) 800-160 MG per tablet Take 1 tablet by mouth 2 (Two) Times a Day. 4/20/22  Yes Nella Flores,    tamsulosin (FLOMAX) 0.4 MG capsule 24 hr capsule TAKE 1 CAPSULE BY MOUTH EVERY DAY 7/5/22  Yes Nella Flores,    DULoxetine (CYMBALTA) 60 MG capsule Take 1  "capsule by mouth Daily for 30 days. 4/4/22 5/4/22  GabeSoniae, APRN       Objective     Vital Signs: /84 (BP Location: Left arm, Patient Position: Sitting, Cuff Size: Adult)   Pulse 73   Temp 97.7 °F (36.5 °C) (Infrared)   Ht 180.3 cm (71\")   Wt 129 kg (284 lb)   SpO2 98%   BMI 39.61 kg/m²   Physical Exam  Vitals reviewed.   Constitutional:       Appearance: Normal appearance.   HENT:      Head: Normocephalic and atraumatic.      Right Ear: External ear normal.      Left Ear: External ear normal.      Nose: Nose normal.   Eyes:      General: No scleral icterus.     Conjunctiva/sclera: Conjunctivae normal.   Cardiovascular:      Rate and Rhythm: Normal rate and regular rhythm.      Heart sounds: Normal heart sounds.   Pulmonary:      Effort: Pulmonary effort is normal.      Breath sounds: Normal breath sounds.   Musculoskeletal:         General: No swelling or tenderness.      Cervical back: Normal range of motion and neck supple.      Comments: Mild LE edema bilaterally. Patient states it is from walking and he will keep an eye on it.    Skin:     General: Skin is warm and dry.   Neurological:      General: No focal deficit present.      Mental Status: He is alert.      Cranial Nerves: No cranial nerve deficit.   Psychiatric:         Mood and Affect: Mood normal.         Behavior: Behavior normal.       Class 2 Severe Obesity (BMI >=35 and <=39.9). Obesity-related health conditions include the following: hypertension. Obesity is unchanged. BMI is is above average; BMI management plan is completed. We discussed portion control and increasing exercise.      Results Reviewed:  Glucose   Date Value Ref Range Status   09/13/2021 96 65 - 99 mg/dL Final   02/18/2020 79 74 - 109 mg/dL Final     BUN   Date Value Ref Range Status   09/13/2021 20 6 - 24 mg/dL Final   02/18/2020 20 6 - 20 mg/dL Final     Creatinine   Date Value Ref Range Status   09/13/2021 0.79 0.76 - 1.27 mg/dL Final   02/18/2020 0.7 " 0.5 - 1.2 mg/dL Final     Sodium   Date Value Ref Range Status   09/13/2021 139 134 - 144 mmol/L Final   02/18/2020 140 136 - 145 mmol/L Final     Potassium   Date Value Ref Range Status   09/13/2021 4.8 3.5 - 5.2 mmol/L Final   02/18/2020 4.4 3.5 - 5.0 mmol/L Final     Chloride   Date Value Ref Range Status   09/13/2021 102 96 - 106 mmol/L Final   02/18/2020 102 98 - 111 mmol/L Final     CO2   Date Value Ref Range Status   02/18/2020 24 22 - 29 mmol/L Final     Total CO2   Date Value Ref Range Status   09/13/2021 25 20 - 29 mmol/L Final     Calcium   Date Value Ref Range Status   09/13/2021 9.0 8.7 - 10.2 mg/dL Final   02/18/2020 9.4 8.6 - 10.0 mg/dL Final     ALT (SGPT)   Date Value Ref Range Status   09/13/2021 14 0 - 44 IU/L Final   02/18/2020 8 5 - 41 U/L Final     AST (SGOT)   Date Value Ref Range Status   09/13/2021 13 0 - 40 IU/L Final   02/18/2020 12 5 - 40 U/L Final     WBC   Date Value Ref Range Status   09/13/2021 10.5 3.4 - 10.8 x10E3/uL Final   02/18/2020 7.5 4.8 - 10.8 K/uL Final     Hematocrit   Date Value Ref Range Status   09/13/2021 37.0 (L) 37.5 - 51.0 % Final   02/18/2020 39.5 (L) 42.0 - 52.0 % Final     Platelets   Date Value Ref Range Status   09/13/2021 251 150 - 450 x10E3/uL Final   02/18/2020 281 130 - 400 K/uL Final     Triglycerides   Date Value Ref Range Status   09/13/2021 217 (H) 0 - 149 mg/dL Final   02/18/2020 98 0 - 149 mg/dL Final     HDL Cholesterol   Date Value Ref Range Status   09/13/2021 39 (L) >39 mg/dL Final   02/18/2020 44 (L) 55 - 121 mg/dL Final     Comment:     VALUES>60 MG/DL ARE ASSOCIATED WITH A DECREASED RISK OF  ATHEROSCLEROTIC CARDIOVASCULAR DISEASE     LDL Cholesterol    Date Value Ref Range Status   02/18/2020 77 <100 mg/dL Final     Comment:     <100 MG/DL=OPITIMAL    100-129 MG/DL=DESIRABLE    130-159 MG/DL BORDERLINE=INCREASED RISK OF ATHEROSCLEROTIC  CARDIOVASCULAR DISEASE    > OR = 160 MG/DL=ASSOCIATED WITH AN INCREASE RISK OF  ATHEROSCLEROTIC CARDIOVASCULAR  DISEASE     LDL Chol Calc ([facility])   Date Value Ref Range Status   09/13/2021 71 0 - 99 mg/dL Final     Assessment / Plan     Assessment/Plan:  1. Chest pain, unspecified type  - ECG 12 Lead  - Stress Test With Myocardial Perfusion - One Day; Future    2. Insomnia, unspecified type  - QUEtiapine (SEROquel) 100 MG tablet; Take 1 tablet by mouth Every Night.  Dispense: 90 tablet; Refill: 1    3. Primary hypertension  - Controlled with current therapy    4. Chronic low back pain with sciatica, sciatica laterality unspecified, unspecified back pain laterality  - Refills are UTD. Controlled on current therapy.     Labs are pending, will get them today.   EKG:  NSR at 68.   NAD  No acute ST changes.   QTc 376    Return in 3 months (on 11/23/2022) for Annual physical. unless patient needs to be seen sooner or acute issues arise.    Code Status: Full    I have discussed the patient results/orders and and plan/recommendation with them at today's visit.      Nella Flores, DO   08/23/2022

## 2022-08-31 DIAGNOSIS — D64.9 ANEMIA, UNSPECIFIED TYPE: Primary | ICD-10-CM

## 2022-09-15 DIAGNOSIS — Z71.6 ENCOUNTER FOR TOBACCO USE CESSATION COUNSELING: ICD-10-CM

## 2022-09-15 DIAGNOSIS — M62.838 MUSCLE SPASMS OF NECK: ICD-10-CM

## 2022-09-15 RX ORDER — BUPROPION HYDROCHLORIDE 150 MG/1
150 TABLET ORAL DAILY
Qty: 30 TABLET | Refills: 1 | Status: SHIPPED | OUTPATIENT
Start: 2022-09-15

## 2022-09-15 RX ORDER — CYCLOBENZAPRINE HCL 10 MG
TABLET ORAL
Qty: 60 TABLET | Refills: 1 | Status: SHIPPED | OUTPATIENT
Start: 2022-09-15

## 2022-09-15 NOTE — TELEPHONE ENCOUNTER
Rx Refill Note  Requested Prescriptions     Pending Prescriptions Disp Refills   • buPROPion XL (WELLBUTRIN XL) 150 MG 24 hr tablet 30 tablet 1     Sig: Take 1 tablet by mouth Daily.      Last office visit with prescribing clinician: 8/23/2022      Next office visit with prescribing clinician: 10/18/2022            Ericka Dyson MA  09/15/22, 09:43 CDT

## 2022-09-17 DIAGNOSIS — I10 ESSENTIAL HYPERTENSION: ICD-10-CM

## 2022-09-19 DIAGNOSIS — M54.42 CHRONIC BILATERAL LOW BACK PAIN WITH LEFT-SIDED SCIATICA: ICD-10-CM

## 2022-09-19 DIAGNOSIS — G89.29 CHRONIC BILATERAL LOW BACK PAIN WITH LEFT-SIDED SCIATICA: ICD-10-CM

## 2022-09-19 RX ORDER — LISINOPRIL 40 MG/1
TABLET ORAL
Qty: 30 TABLET | Refills: 5 | Status: SHIPPED | OUTPATIENT
Start: 2022-09-19 | End: 2023-03-09

## 2022-09-19 RX ORDER — GABAPENTIN 800 MG/1
800 TABLET ORAL 4 TIMES DAILY
Qty: 120 TABLET | Refills: 0 | Status: SHIPPED | OUTPATIENT
Start: 2022-09-19 | End: 2022-10-18 | Stop reason: SDUPTHER

## 2022-09-19 NOTE — TELEPHONE ENCOUNTER
Caller: ELODIA FRANCO   Relationship: SELF     Best call back number: [phone]    Requested Prescriptions:      gabapentin (NEURONTIN) 800 MG tablet  800 mg, 4 Times Daily         Pharmacy where request should be sent:    /pharmacy #[id number] - [place], [place] - [address] [facility] - [phone] [facility] [phone]   [phone]  Additional details provided by patient: NONE     Does the patient have less than a 3 day supply:  [x] Yes  [] No    Rivera Renee Rep   09/19/22 09:48 CDT

## 2022-09-19 NOTE — TELEPHONE ENCOUNTER
Rx Refill Note  Requested Prescriptions     Pending Prescriptions Disp Refills   • lisinopril (PRINIVIL,ZESTRIL) 40 MG tablet [Pharmacy Med Name: LISINOPRIL 40 MG TABLET] 30 tablet 5     Sig: TAKE 1 TABLET BY MOUTH EVERY DAY      Last office visit with prescribing clinician: 8/23/2022      Next office visit with prescribing clinician: 10/18/2022            Unique Coronado RN  09/19/22, 08:05 CDT

## 2022-09-19 NOTE — TELEPHONE ENCOUNTER
YOU SEE PT TOMORROW       Rx Refill Note  Requested Prescriptions     Pending Prescriptions Disp Refills   • gabapentin (NEURONTIN) 800 MG tablet 120 tablet 0     Sig: Take 1 tablet by mouth 4 (Four) Times a Day.   Med last filled: 8/19/22   Last office visit with prescribing clinician: 8/23/2022      Next office visit with prescribing clinician: 9/20/2022     Compliance Drug Analysis, Ur - Urine, Clean Catch (07/20/2022 09:57)               Unique Coronado RN  09/19/22, 09:50 CDT

## 2022-09-20 ENCOUNTER — TELEPHONE (OUTPATIENT)
Dept: INTERNAL MEDICINE | Facility: CLINIC | Age: 57
End: 2022-09-20

## 2022-09-20 ENCOUNTER — OFFICE VISIT (OUTPATIENT)
Dept: INTERNAL MEDICINE | Facility: CLINIC | Age: 57
End: 2022-09-20

## 2022-09-20 VITALS
SYSTOLIC BLOOD PRESSURE: 130 MMHG | HEIGHT: 71 IN | DIASTOLIC BLOOD PRESSURE: 60 MMHG | BODY MASS INDEX: 39.76 KG/M2 | TEMPERATURE: 96.9 F | HEART RATE: 106 BPM | WEIGHT: 284 LBS | OXYGEN SATURATION: 92 %

## 2022-09-20 DIAGNOSIS — J40 BRONCHITIS: ICD-10-CM

## 2022-09-20 DIAGNOSIS — R05.9 COUGH: Primary | ICD-10-CM

## 2022-09-20 LAB
EXPIRATION DATE: NORMAL
INTERNAL CONTROL: NORMAL
Lab: NORMAL
SARS-COV-2 AG UPPER RESP QL IA.RAPID: NOT DETECTED

## 2022-09-20 PROCEDURE — 99213 OFFICE O/P EST LOW 20 MIN: CPT | Performed by: INTERNAL MEDICINE

## 2022-09-20 PROCEDURE — 87426 SARSCOV CORONAVIRUS AG IA: CPT | Performed by: INTERNAL MEDICINE

## 2022-09-20 RX ORDER — GUAIFENESIN AND CODEINE PHOSPHATE 100; 10 MG/5ML; MG/5ML
5 SOLUTION ORAL 3 TIMES DAILY PRN
Qty: 180 ML | Refills: 0 | Status: SHIPPED | OUTPATIENT
Start: 2022-09-20

## 2022-09-20 RX ORDER — LEVOFLOXACIN 750 MG/1
750 TABLET ORAL DAILY
Qty: 5 TABLET | Refills: 0 | Status: SHIPPED | OUTPATIENT
Start: 2022-09-20

## 2022-09-20 RX ORDER — METHYLPREDNISOLONE 4 MG/1
1 TABLET ORAL DAILY
Qty: 21 TABLET | Refills: 0 | Status: SHIPPED | OUTPATIENT
Start: 2022-09-20

## 2022-09-20 NOTE — TELEPHONE ENCOUNTER
Called pt  he said he wants to talk with dr Flores about something they discussed in the room,  just call whenever

## 2022-09-20 NOTE — TELEPHONE ENCOUNTER
Called lvm to pharmacy that it is okay for pt to take cough medication, he is gong to contact the Suboxone clinic  So they know he is taking it

## 2022-09-20 NOTE — TELEPHONE ENCOUNTER
Caller: Joan Ramos    Relationship: Self    Best call back number: [phone]    What is the best time to reach you:   ANYTIME     Who are you requesting to speak with (clinical staff, provider,  specific staff member):   DR. BERRY    Do you know the name of the person who called:   JOAN RAMOS    What was the call regarding:   PATIENT CALLED AND ADVISED THAT HE WOULD NEED PCP TO CALL BACK, DIDN'T DISCLOSE REASON.         Do you require a callback: YES

## 2022-09-20 NOTE — PROGRESS NOTES
Subjective     Chief Complaint   Patient presents with   • Cough     Symptoms started 3 days ago   2 days ago started coughing yellow gunk    • Allergies       History of Present Illness  Cough.   Has been feeling bad for the last three days.  Started as a dry cough and then today he started hacking. Yellow production. Mild sore throat.   No fever or chills.     Patient's PMR from outside medical facility reviewed and noted.    Review of Systems   Constitutional: Negative for chills and fever.   HENT: Positive for congestion and sore throat.    Respiratory: Positive for cough. Negative for shortness of breath.    Cardiovascular: Negative for chest pain and leg swelling.   Gastrointestinal: Negative for constipation and diarrhea.   Genitourinary: Negative for dysuria and hematuria.      Otherwise complete ROS reviewed and negative except as mentioned in the HPI.    Past Medical History:   Past Medical History:   Diagnosis Date   • Anxiety    • Back pain    • Chronic pain    • Depression    • Elevated cholesterol    • Hypertension      Past Surgical History:  Past Surgical History:   Procedure Laterality Date   • WOUND CLOSURE Right 5/28/2019    Procedure: Complex closure of open scalp wound avulsion defect 7x 4.5 cm with rotation/ advancement flap closure 9cm x 5 cm;  Surgeon: Fam Cardoso MD;  Location: [facility];  Service: ENT     Social History:  reports that he has been smoking cigarettes. He has a 19.00 pack-year smoking history. He has never used smokeless tobacco. He reports previous alcohol use. He reports that he does not use drugs.    Family History: family history includes Diabetes in his mother; Hyperlipidemia in his mother; Hypertension in his father and mother; Stroke in his maternal grandfather.       Allergies:  No Known Allergies  Medications:  Prior to Admission medications    Medication Sig Start Date End Date Taking? Authorizing Provider   albuterol (PROVENTIL) 2.5 MG/0.5ML  nebulizer solution Take 2.5 mg by nebulization Every 6 (Six) Hours As Needed for Wheezing. 3/3/22  Yes Nella Flores DO   albuterol sulfate  (90 Base) MCG/ACT inhaler Inhale 2 puffs Every 4 (Four) Hours As Needed for Wheezing. 6/17/21  Yes Nella Flores DO   amLODIPine (NORVASC) 5 MG tablet Take 5 mg by mouth Daily. 7/4/21  Yes ProviderJanie MD   atomoxetine (STRATTERA) 60 MG capsule Take 1 capsule by mouth Daily. 4/20/22  Yes Nella Flores DO   buprenorphine-naloxone (SUBOXONE) 8-2 MG per SL tablet Place 1 tablet under the tongue Daily.   Yes ProviderJanie MD   buPROPion XL (WELLBUTRIN XL) 150 MG 24 hr tablet Take 1 tablet by mouth Daily. 9/15/22  Yes Nella Flores DO   clotrimazole-betamethasone (Lotrisone) 1-0.05 % cream Apply  topically to the appropriate area as directed 2 (Two) Times a Day. 2/8/21  Yes Nella Flores DO   [facility] Allergy Relief-D12 5-120 MG per 12 hr tablet TAKE 1 TABLET BY MOUTH 2 (TWO) TIMES A DAY AS NEEDED FOR ALLERGIES. 1/27/22  Yes Nella Flores DO   cyclobenzaprine (FLEXERIL) 10 MG tablet TAKE 1 TABLET BY MOUTH 3 TIMES A DAY AS NEEDED FOR MUSCLE SPASMS. 9/15/22  Yes Nella Flores DO   fluticasone (FLONASE) 50 MCG/ACT nasal spray 2 sprays into the nostril(s) as directed by provider Daily. 2/15/21  Yes Nella Flores DO   gabapentin (NEURONTIN) 800 MG tablet Take 1 tablet by mouth 4 (Four) Times a Day. 9/19/22  Yes Nella Flores DO   guaifenesin-dextromethorphan (MUCINEX DM)  MG tablet sustained-release 12 hour tablet Take 1 tablet by mouth 2 (Two) Times a Day As Needed (sinus congestion). 3/31/22  Yes Nella Flores DO   ibuprofen (ADVIL,MOTRIN) 800 MG tablet TAKE 1 TABLET BY MOUTH TWICE A DAY 6/3/22  Yes Sonia Bernal APRN   lisinopril (PRINIVIL,ZESTRIL) 40 MG tablet TAKE 1 TABLET BY MOUTH EVERY DAY 9/19/22  Yes Nella Flores,    Loratadine (CLARITIN PO) Take 10 mg by mouth Daily.   Yes  "ProviderJanie MD   meclizine (ANTIVERT) 25 MG tablet Take 1 tablet by mouth 3 (Three) Times a Day As Needed for Dizziness. 3/31/22  Yes Nella Flores DO   mupirocin (BACTROBAN) 2 % ointment Apply 1 application topically to the appropriate area as directed 3 (Three) Times a Day. 4/20/22  Yes Nella Flores DO   mupirocin (BACTROBAN) 2 % ointment Apply 1 application topically to the appropriate area as directed 3 (Three) Times a Day. 4/20/22  Yes Nella Flores DO   Omega-3 Fatty Acids (FISH OIL) 1000 MG capsule capsule Take 1,000 mg by mouth Daily With Breakfast.   Yes Janie Bueno MD   polyethylene glycol (GoLYTELY) 236 g solution Take as directed by office instructions. 6/22/21  Yes Sonia Guerrero APRN   QUEtiapine (SEROquel) 100 MG tablet Take 1 tablet by mouth Every Night. 8/23/22  Yes eNlla Flores DO   sulfamethoxazole-trimethoprim (Bactrim DS) 800-160 MG per tablet Take 1 tablet by mouth 2 (Two) Times a Day. 4/20/22  Yes Nella Flores DO   tamsulosin (FLOMAX) 0.4 MG capsule 24 hr capsule TAKE 1 CAPSULE BY MOUTH EVERY DAY 7/5/22  Yes Nella Flores DO   DULoxetine (CYMBALTA) 60 MG capsule Take 1 capsule by mouth Daily for 30 days. 4/4/22 5/4/22  Sonia Bernal APRN       Objective     Vital Signs: /60 (BP Location: Left arm, Patient Position: Sitting, Cuff Size: Large Adult)   Pulse 106   Temp 96.9 °F (36.1 °C) (Temporal)   Ht 180.3 cm (71\")   Wt 129 kg (284 lb)   SpO2 92%   BMI 39.61 kg/m²   Physical Exam  Vitals reviewed.   Constitutional:       Appearance: Normal appearance.   HENT:      Head: Normocephalic and atraumatic.      Ears:      Comments: Bilateral TM distended.      Nose: Nose normal.      Mouth/Throat:      Comments: Posterior pharynx erythema. PND think and yellow.   Eyes:      General: No scleral icterus.     Conjunctiva/sclera: Conjunctivae normal.   Cardiovascular:      Rate and Rhythm: Normal rate and regular " rhythm.      Heart sounds: Normal heart sounds.   Pulmonary:      Effort: Pulmonary effort is normal.      Breath sounds: Normal breath sounds.   Musculoskeletal:         General: No swelling or tenderness.      Cervical back: Normal range of motion and neck supple.   Skin:     General: Skin is warm and dry.   Neurological:      General: No focal deficit present.      Mental Status: He is alert.      Cranial Nerves: No cranial nerve deficit.   Psychiatric:         Mood and Affect: Mood normal.         Behavior: Behavior normal.       Class 2 Severe Obesity (BMI >=35 and <=39.9). Obesity-related health conditions include the following: hypertension. Obesity is worsening. BMI is is above average; BMI management plan is completed. We discussed portion control and increasing exercise.      Results Reviewed:  Glucose   Date Value Ref Range Status   08/23/2022 73 65 - 99 mg/dL Final   02/18/2020 79 74 - 109 mg/dL Final     BUN   Date Value Ref Range Status   08/23/2022 16 6 - 24 mg/dL Final   02/18/2020 20 6 - 20 mg/dL Final     Creatinine   Date Value Ref Range Status   08/23/2022 0.82 0.76 - 1.27 mg/dL Final   02/18/2020 0.7 0.5 - 1.2 mg/dL Final     Sodium   Date Value Ref Range Status   08/23/2022 139 134 - 144 mmol/L Final   02/18/2020 140 136 - 145 mmol/L Final     Potassium   Date Value Ref Range Status   08/23/2022 5.2 3.5 - 5.2 mmol/L Final   02/18/2020 4.4 3.5 - 5.0 mmol/L Final     Chloride   Date Value Ref Range Status   08/23/2022 98 96 - 106 mmol/L Final   02/18/2020 102 98 - 111 mmol/L Final     CO2   Date Value Ref Range Status   02/18/2020 24 22 - 29 mmol/L Final     Total CO2   Date Value Ref Range Status   08/23/2022 28 20 - 29 mmol/L Final     Calcium   Date Value Ref Range Status   08/23/2022 9.3 8.7 - 10.2 mg/dL Final   02/18/2020 9.4 8.6 - 10.0 mg/dL Final     ALT (SGPT)   Date Value Ref Range Status   08/23/2022 15 0 - 44 IU/L Final   02/18/2020 8 5 - 41 U/L Final     AST (SGOT)   Date Value Ref  Range Status   08/23/2022 14 0 - 40 IU/L Final   02/18/2020 12 5 - 40 U/L Final     WBC   Date Value Ref Range Status   08/23/2022 9.1 3.4 - 10.8 x10E3/uL Final   02/18/2020 7.5 4.8 - 10.8 K/uL Final     Hematocrit   Date Value Ref Range Status   08/23/2022 36.0 (L) 37.5 - 51.0 % Final   02/18/2020 39.5 (L) 42.0 - 52.0 % Final     Platelets   Date Value Ref Range Status   08/23/2022 281 150 - 450 x10E3/uL Final   02/18/2020 281 130 - 400 K/uL Final     Triglycerides   Date Value Ref Range Status   08/23/2022 119 0 - 149 mg/dL Final   02/18/2020 98 0 - 149 mg/dL Final     HDL Cholesterol   Date Value Ref Range Status   08/23/2022 49 >39 mg/dL Final   02/18/2020 44 (L) 55 - 121 mg/dL Final     Comment:     VALUES>60 MG/DL ARE ASSOCIATED WITH A DECREASED RISK OF  ATHEROSCLEROTIC CARDIOVASCULAR DISEASE     LDL Cholesterol    Date Value Ref Range Status   02/18/2020 77 <100 mg/dL Final     Comment:     <100 MG/DL=OPITIMAL    100-129 MG/DL=DESIRABLE    130-159 MG/DL BORDERLINE=INCREASED RISK OF ATHEROSCLEROTIC  CARDIOVASCULAR DISEASE    > OR = 160 MG/DL=ASSOCIATED WITH AN INCREASE RISK OF  ATHEROSCLEROTIC CARDIOVASCULAR DISEASE     LDL Chol Calc ([facility])   Date Value Ref Range Status   08/23/2022 95 0 - 99 mg/dL Final       Assessment / Plan     Assessment/Plan:  1. Cough  - POCT SARS-CoV-2 Antigen ADINA    2. Bronchitis  - levoFLOXacin (Levaquin) 750 MG tablet; Take 1 tablet by mouth Daily.  Dispense: 5 tablet; Refill: 0  - methylPREDNISolone (MEDROL) 4 MG dose pack; Take 1 tablet by mouth Daily. Take as directed on package instructions.  Dispense: 21 tablet; Refill: 0  - guaiFENesin-codeine (GUAIFENESIN AC) 100-10 MG/5ML liquid; Take 5 mL by mouth 3 (Three) Times a Day As Needed for Cough.  Dispense: 180 mL; Refill: 0        Return if symptoms worsen or fail to improve, for Recheck, Next scheduled follow up. unless patient needs to be seen sooner or acute issues arise.    Code Status: full    I have discussed the patient  results/orders and and plan/recommendation with them at today's visit.      Nella Flores,    09/20/2022

## 2022-09-20 NOTE — TELEPHONE ENCOUNTER
Pharmacy Name: [facility]    Pharmacy representative name: RANJAN    Pharmacy representative phone number: [phone]    What medication are you calling in regards to: guaiFENesin-codeine (GUAIFENESIN AC) 100-10 MG/5ML liquid    What question does the pharmacy have: PATIENT IS GOING TO A [facility]BOXINE CLINICAL AND WANTED CLARIFICATION AS IT COMES UP AS A CONTRADICTION IN MEDICATION

## 2022-09-30 DIAGNOSIS — F33.42 RECURRENT MAJOR DEPRESSIVE DISORDER, IN FULL REMISSION: ICD-10-CM

## 2022-09-30 RX ORDER — DULOXETIN HYDROCHLORIDE 60 MG/1
60 CAPSULE, DELAYED RELEASE ORAL DAILY
Qty: 90 CAPSULE | Refills: 3 | Status: SHIPPED | OUTPATIENT
Start: 2022-09-30 | End: 2022-12-27 | Stop reason: SDUPTHER

## 2022-09-30 NOTE — TELEPHONE ENCOUNTER
Caller: Raimundo Ramos    Relationship: Self    Best call back number: [phone]    Requested Prescriptions:   Requested Prescriptions     Pending Prescriptions Disp Refills   • DULoxetine (CYMBALTA) 60 MG capsule 90 capsule 3     Sig: Take 1 capsule by mouth Daily for 30 days.        Pharmacy where request should be sent: [facility]/PHARMACY #[id number] - [place] [phone] [facility] [phone] FX     Additional details provided by patient:   PATIENT SAID THAT HE ONLY HAS TWO PILLS LEFT    Does the patient have less than a 3 day supply:  [x] Yes  [] No    Delia Encinas, PCT   09/30/22 10:22 CDT

## 2022-10-18 DIAGNOSIS — G89.29 CHRONIC BILATERAL LOW BACK PAIN WITH LEFT-SIDED SCIATICA: ICD-10-CM

## 2022-10-18 DIAGNOSIS — M54.42 CHRONIC BILATERAL LOW BACK PAIN WITH LEFT-SIDED SCIATICA: ICD-10-CM

## 2022-10-18 RX ORDER — GABAPENTIN 800 MG/1
800 TABLET ORAL 4 TIMES DAILY
Qty: 120 TABLET | Refills: 0 | Status: SHIPPED | OUTPATIENT
Start: 2022-10-18 | End: 2022-11-18 | Stop reason: SDUPTHER

## 2022-10-18 NOTE — TELEPHONE ENCOUNTER
Rx Refill Note  Requested Prescriptions     Pending Prescriptions Disp Refills   • gabapentin (NEURONTIN) 800 MG tablet 120 tablet 0     Sig: Take 1 tablet by mouth 4 (Four) Times a Day.      Last office visit with prescribing clinician: 9/20/2022      Next office visit with prescribing clinician: 10/31/2022        Compliance Drug Analysis, Ur - Urine, Clean Catch (07/20/2022 09:57)      Radha Parker CMA  10/18/22, 13:32 CDT

## 2022-10-18 NOTE — TELEPHONE ENCOUNTER
Caller: Raimundo Ramos    Relationship: Self    Best call back number: [phone]    Requested Prescriptions:   Requested Prescriptions     Pending Prescriptions Disp Refills   • gabapentin (NEURONTIN) 800 MG tablet 120 tablet 0     Sig: Take 1 tablet by mouth 4 (Four) Times a Day.        Pharmacy where request should be sent: [facility]/PHARMACY #[id number] - [address] [phone] [facility] [phone] FX     Does the patient have less than a 3 day supply:  [x] Yes  [] No    Rivera Ramos Rep   10/18/22 12:55 CDT

## 2022-10-20 NOTE — TELEPHONE ENCOUNTER
Rx Refill Note  Requested Prescriptions     Pending Prescriptions Disp Refills   • gabapentin (NEURONTIN) 800 MG tablet 120 tablet 0     Sig: Take 1 tablet by mouth 4 (Four) Times a Day.      Last office visit with prescribing clinician: 4/20/2022      Next office visit with prescribing clinician: 7/20/2022     Compliance Drug Analysis, Ur - Urine, Clean Catch (04/20/2022 09:10)         Ericka Dyson MA  07/19/22, 13:27 CDT   [FreeTextEntry1] : Patient for followup with regard to management of problems which include:\par \par 1. History of hypertension\par \par 2. History of hyperlipidemia\par \par 3.Dilated ascending aorta \par \par \par

## 2022-10-31 ENCOUNTER — OFFICE VISIT (OUTPATIENT)
Dept: INTERNAL MEDICINE | Facility: CLINIC | Age: 57
End: 2022-10-31

## 2022-10-31 VITALS
WEIGHT: 270 LBS | DIASTOLIC BLOOD PRESSURE: 77 MMHG | HEIGHT: 71 IN | TEMPERATURE: 97.1 F | SYSTOLIC BLOOD PRESSURE: 176 MMHG | OXYGEN SATURATION: 98 % | HEART RATE: 91 BPM | BODY MASS INDEX: 37.8 KG/M2

## 2022-10-31 DIAGNOSIS — M54.41 CHRONIC MIDLINE LOW BACK PAIN WITH RIGHT-SIDED SCIATICA: ICD-10-CM

## 2022-10-31 DIAGNOSIS — D64.9 ANEMIA, UNSPECIFIED TYPE: ICD-10-CM

## 2022-10-31 DIAGNOSIS — Z79.899 LONG TERM USE OF DRUG: Primary | ICD-10-CM

## 2022-10-31 DIAGNOSIS — G89.29 CHRONIC MIDLINE LOW BACK PAIN WITH RIGHT-SIDED SCIATICA: ICD-10-CM

## 2022-10-31 DIAGNOSIS — G47.00 INSOMNIA, UNSPECIFIED TYPE: ICD-10-CM

## 2022-10-31 PROCEDURE — 96372 THER/PROPH/DIAG INJ SC/IM: CPT | Performed by: INTERNAL MEDICINE

## 2022-10-31 PROCEDURE — 99214 OFFICE O/P EST MOD 30 MIN: CPT | Performed by: INTERNAL MEDICINE

## 2022-10-31 RX ORDER — TRIAMCINOLONE ACETONIDE 40 MG/ML
40 INJECTION, SUSPENSION INTRA-ARTICULAR; INTRAMUSCULAR ONCE
Status: COMPLETED | OUTPATIENT
Start: 2022-10-31 | End: 2022-10-31

## 2022-10-31 RX ORDER — QUETIAPINE FUMARATE 100 MG/1
100 TABLET, FILM COATED ORAL NIGHTLY
Qty: 90 TABLET | Refills: 1 | Status: SHIPPED | OUTPATIENT
Start: 2022-10-31

## 2022-10-31 RX ORDER — KETOROLAC TROMETHAMINE 30 MG/ML
60 INJECTION, SOLUTION INTRAMUSCULAR; INTRAVENOUS ONCE
Status: COMPLETED | OUTPATIENT
Start: 2022-10-31 | End: 2022-10-31

## 2022-10-31 RX ORDER — DEXAMETHASONE SODIUM PHOSPHATE 10 MG/ML
10 INJECTION INTRAMUSCULAR; INTRAVENOUS ONCE
Status: COMPLETED | OUTPATIENT
Start: 2022-10-31 | End: 2022-10-31

## 2022-10-31 RX ADMIN — KETOROLAC TROMETHAMINE 60 MG: 30 INJECTION, SOLUTION INTRAMUSCULAR; INTRAVENOUS at 11:51

## 2022-10-31 RX ADMIN — TRIAMCINOLONE ACETONIDE 40 MG: 40 INJECTION, SUSPENSION INTRA-ARTICULAR; INTRAMUSCULAR at 11:52

## 2022-10-31 RX ADMIN — DEXAMETHASONE SODIUM PHOSPHATE 10 MG: 10 INJECTION INTRAMUSCULAR; INTRAVENOUS at 11:52

## 2022-10-31 NOTE — PROGRESS NOTES
Subjective     Chief Complaint   Patient presents with   • Back Pain     3 MO FU     • Insomnia       Back Pain  This is a chronic problem. The current episode started more than 1 year ago. The problem occurs 2 to 4 times per day. The problem has been rapidly worsening since onset. The pain is present in the lumbar spine. The quality of the pain is described as aching, stabbing and shooting. The pain is moderate. The pain is worse during the day. The symptoms are aggravated by bending. Stiffness is present in the morning. Pertinent negatives include no chest pain, dysuria or fever. Risk factors include sedentary lifestyle. He has tried muscle relaxant for the symptoms. The treatment provided mild relief.   Insomnia  Associated symptoms include arthralgias. Pertinent negatives include no chest pain, chills, congestion, coughing or fever.     He bought property and is trying to get it cleared off.     His BP is up today. He states that his back is killing him and he took a bad step and lg him like he was using a abraham hammer.     Right in the center of his back and going down his right leg.     Patient's PMR from outside medical facility reviewed and noted.    Review of Systems   Constitutional: Negative for chills and fever.   HENT: Negative for congestion and rhinorrhea.    Respiratory: Negative for cough and shortness of breath.    Cardiovascular: Negative for chest pain and leg swelling.   Gastrointestinal: Negative for constipation and diarrhea.   Genitourinary: Negative for dysuria and hematuria.   Musculoskeletal: Positive for arthralgias and back pain.   Psychiatric/Behavioral: The patient has insomnia.       Otherwise complete ROS reviewed and negative except as mentioned in the HPI.    Past Medical History:   Past Medical History:   Diagnosis Date   • Anxiety    • Back pain    • Chronic pain    • Depression    • Elevated cholesterol    • Hypertension      Past Surgical History:  Past Surgical  History:   Procedure Laterality Date   • WOUND CLOSURE Right 5/28/2019    Procedure: Complex closure of open scalp wound avulsion defect 7x 4.5 cm with rotation/ advancement flap closure 9cm x 5 cm;  Surgeon: Fam Cardoso MD;  Location: [facility];  Service: ENT     Social History:  reports that he has been smoking cigarettes. He has a 19.00 pack-year smoking history. He has never used smokeless tobacco. He reports that he does not currently use alcohol. He reports that he does not use drugs.    Family History: family history includes Diabetes in his mother; Hyperlipidemia in his mother; Hypertension in his father and mother; Stroke in his maternal grandfather.      Allergies:  No Known Allergies  Medications:  Prior to Admission medications    Medication Sig Start Date End Date Taking? Authorizing Provider   albuterol (PROVENTIL) 2.5 MG/0.5ML nebulizer solution Take 2.5 mg by nebulization Every 6 (Six) Hours As Needed for Wheezing. 3/3/22  Yes Nella Flores DO   albuterol sulfate  (90 Base) MCG/ACT inhaler Inhale 2 puffs Every 4 (Four) Hours As Needed for Wheezing. 6/17/21  Yes Nella Flores DO   amLODIPine (NORVASC) 5 MG tablet Take 5 mg by mouth Daily. 7/4/21  Yes Janie Bueno MD   atomoxetine (STRATTERA) 60 MG capsule Take 1 capsule by mouth Daily. 4/20/22  Yes Nella Flores DO   buprenorphine-naloxone (SUBOXONE) 8-2 MG per SL tablet Place 1 tablet under the tongue Daily.   Yes Janie Bueno MD   buPROPion XL (WELLBUTRIN XL) 150 MG 24 hr tablet Take 1 tablet by mouth Daily. 9/15/22  Yes Nella Flores DO   clotrimazole-betamethasone (Lotrisone) 1-0.05 % cream Apply  topically to the appropriate area as directed 2 (Two) Times a Day. 2/8/21  Yes Nella Flores DO   [facility] Allergy Relief-D12 5-120 MG per 12 hr tablet TAKE 1 TABLET BY MOUTH 2 (TWO) TIMES A DAY AS NEEDED FOR ALLERGIES. 1/27/22  Yes Nella Flores DO   cyclobenzaprine (FLEXERIL) 10 MG  tablet TAKE 1 TABLET BY MOUTH 3 TIMES A DAY AS NEEDED FOR MUSCLE SPASMS. 9/15/22  Yes Nella Flores DO   fluticasone (FLONASE) 50 MCG/ACT nasal spray 2 sprays into the nostril(s) as directed by provider Daily. 2/15/21  Yes Nella Flores DO   gabapentin (NEURONTIN) 800 MG tablet Take 1 tablet by mouth 4 (Four) Times a Day. 10/18/22  Yes Fam Briggs MD   guaiFENesin-codeine (GUAIFENESIN AC) 100-10 MG/5ML liquid Take 5 mL by mouth 3 (Three) Times a Day As Needed for Cough. 9/20/22  Yes Nella Flores DO   guaifenesin-dextromethorphan (MUCINEX DM)  MG tablet sustained-release 12 hour tablet Take 1 tablet by mouth 2 (Two) Times a Day As Needed (sinus congestion). 3/31/22  Yes Nella Flores DO   ibuprofen (ADVIL,MOTRIN) 800 MG tablet TAKE 1 TABLET BY MOUTH TWICE A DAY 6/3/22  Yes Sonia Bernal APRN   levoFLOXacin (Levaquin) 750 MG tablet Take 1 tablet by mouth Daily. 9/20/22  Yes Nella Flores DO   lisinopril (PRINIVIL,ZESTRIL) 40 MG tablet TAKE 1 TABLET BY MOUTH EVERY DAY 9/19/22  Yes Nella Flores DO   Loratadine (CLARITIN PO) Take 10 mg by mouth Daily.   Yes Provider, MD Janie   meclizine (ANTIVERT) 25 MG tablet Take 1 tablet by mouth 3 (Three) Times a Day As Needed for Dizziness. 3/31/22  Yes Nella Flores DO   methylPREDNISolone (MEDROL) 4 MG dose pack Take 1 tablet by mouth Daily. Take as directed on package instructions. 9/20/22  Yes Nella Flores DO   mupirocin (BACTROBAN) 2 % ointment Apply 1 application topically to the appropriate area as directed 3 (Three) Times a Day. 4/20/22  Yes Nella Flores DO   mupirocin (BACTROBAN) 2 % ointment Apply 1 application topically to the appropriate area as directed 3 (Three) Times a Day. 4/20/22  Yes Nella Flores,    Omega-3 Fatty Acids (FISH OIL) 1000 MG capsule capsule Take 1,000 mg by mouth Daily With Breakfast.   Yes Provider, MD Janie   polyethylene glycol (GoLYTELY) 236 g  "solution Take as directed by office instructions. 6/22/21  Yes Sonia Guerrero APRN   QUEtiapine (SEROquel) 100 MG tablet Take 1 tablet by mouth Every Night. 8/23/22  Yes Nella Flores DO   sulfamethoxazole-trimethoprim (Bactrim DS) 800-160 MG per tablet Take 1 tablet by mouth 2 (Two) Times a Day. 4/20/22  Yes Nella Flores DO   tamsulosin (FLOMAX) 0.4 MG capsule 24 hr capsule TAKE 1 CAPSULE BY MOUTH EVERY DAY 7/5/22  Yes Nella Flores DO   DULoxetine (CYMBALTA) 60 MG capsule Take 1 capsule by mouth Daily for 30 days. 9/30/22 10/30/22  Nella Flores DO       Objective     Vital Signs: /77 (BP Location: Left arm, Patient Position: Sitting, Cuff Size: Adult)   Pulse 91   Temp 97.1 °F (36.2 °C) (Infrared)   Ht 180.3 cm (71\")   Wt 122 kg (270 lb)   SpO2 98%   BMI 37.66 kg/m²   Physical Exam  Vitals reviewed.   Constitutional:       Appearance: He is ill-appearing.   HENT:      Head: Normocephalic and atraumatic.      Right Ear: External ear normal.      Left Ear: External ear normal.      Nose: Nose normal.   Eyes:      General: No scleral icterus.     Conjunctiva/sclera: Conjunctivae normal.   Cardiovascular:      Rate and Rhythm: Normal rate and regular rhythm.      Heart sounds: Normal heart sounds.   Pulmonary:      Effort: Pulmonary effort is normal.      Breath sounds: Normal breath sounds.   Musculoskeletal:         General: No swelling or tenderness.      Cervical back: Normal range of motion and neck supple.   Skin:     General: Skin is warm and dry.   Neurological:      General: No focal deficit present.      Mental Status: He is alert.      Cranial Nerves: No cranial nerve deficit.      Comments: Restless in his chair trying to find a comfortable position.    Psychiatric:         Mood and Affect: Mood normal.         Behavior: Behavior normal.       Class 2 Severe Obesity (BMI >=35 and <=39.9). Obesity-related health conditions include the following: hypertension. " Obesity is unchanged. BMI is is above average; BMI management plan is completed. We discussed portion control and increasing exercise.      Results Reviewed:  Glucose   Date Value Ref Range Status   08/23/2022 73 65 - 99 mg/dL Final   02/18/2020 79 74 - 109 mg/dL Final     BUN   Date Value Ref Range Status   08/23/2022 16 6 - 24 mg/dL Final   02/18/2020 20 6 - 20 mg/dL Final     Creatinine   Date Value Ref Range Status   08/23/2022 0.82 0.76 - 1.27 mg/dL Final   02/18/2020 0.7 0.5 - 1.2 mg/dL Final     Sodium   Date Value Ref Range Status   08/23/2022 139 134 - 144 mmol/L Final   02/18/2020 140 136 - 145 mmol/L Final     Potassium   Date Value Ref Range Status   08/23/2022 5.2 3.5 - 5.2 mmol/L Final   02/18/2020 4.4 3.5 - 5.0 mmol/L Final     Chloride   Date Value Ref Range Status   08/23/2022 98 96 - 106 mmol/L Final   02/18/2020 102 98 - 111 mmol/L Final     CO2   Date Value Ref Range Status   02/18/2020 24 22 - 29 mmol/L Final     Total CO2   Date Value Ref Range Status   08/23/2022 28 20 - 29 mmol/L Final     Calcium   Date Value Ref Range Status   08/23/2022 9.3 8.7 - 10.2 mg/dL Final   02/18/2020 9.4 8.6 - 10.0 mg/dL Final     ALT (SGPT)   Date Value Ref Range Status   08/23/2022 15 0 - 44 IU/L Final   02/18/2020 8 5 - 41 U/L Final     AST (SGOT)   Date Value Ref Range Status   08/23/2022 14 0 - 40 IU/L Final   02/18/2020 12 5 - 40 U/L Final     WBC   Date Value Ref Range Status   08/23/2022 9.1 3.4 - 10.8 x10E3/uL Final   02/18/2020 7.5 4.8 - 10.8 K/uL Final     Hematocrit   Date Value Ref Range Status   08/23/2022 36.0 (L) 37.5 - 51.0 % Final   02/18/2020 39.5 (L) 42.0 - 52.0 % Final     Platelets   Date Value Ref Range Status   08/23/2022 281 150 - 450 x10E3/uL Final   02/18/2020 281 130 - 400 K/uL Final     Triglycerides   Date Value Ref Range Status   08/23/2022 119 0 - 149 mg/dL Final   02/18/2020 98 0 - 149 mg/dL Final     HDL Cholesterol   Date Value Ref Range Status   08/23/2022 49 >39 mg/dL Final    02/18/2020 44 (L) 55 - 121 mg/dL Final     Comment:     VALUES>60 MG/DL ARE ASSOCIATED WITH A DECREASED RISK OF  ATHEROSCLEROTIC CARDIOVASCULAR DISEASE     LDL Cholesterol    Date Value Ref Range Status   02/18/2020 77 <100 mg/dL Final     Comment:     <100 MG/DL=OPITIMAL    100-129 MG/DL=DESIRABLE    130-159 MG/DL BORDERLINE=INCREASED RISK OF ATHEROSCLEROTIC  CARDIOVASCULAR DISEASE    > OR = 160 MG/DL=ASSOCIATED WITH AN INCREASE RISK OF  ATHEROSCLEROTIC CARDIOVASCULAR DISEASE     LDL Chol Calc (NIH)   Date Value Ref Range Status   08/23/2022 95 0 - 99 mg/dL Final       Assessment / Plan     Assessment/Plan:  1. Long term use of drug  - Compliance Drug Analysis, Ur - Urine, Clean Catch    2. Chronic midline low back pain with right-sided sciatica  - Patient is UTD on his medication refills.   - ketorolac (TORADOL) injection 60 mg  - triamcinolone acetonide (KENALOG-40) injection 40 mg  - dexamethasone (DECADRON) injection 10 mg    3. Anemia  - Stool cards were not returned.   - Will send Cologuard to the house in hopes that he will be more compliant. At his next lab check will check iron studies with this.   - We discussed the possibility of colon cancer. Discussed his compliance.       Return in about 3 months (around 1/31/2023) for Recheck, Next scheduled follow up. unless patient needs to be seen sooner or acute issues arise.    Code Status: full    I have discussed the patient results/orders and and plan/recommendation with them at today's visit.      Nella Flores, DO   10/31/2022

## 2022-11-18 DIAGNOSIS — R42 DIZZINESS: ICD-10-CM

## 2022-11-18 DIAGNOSIS — G89.29 CHRONIC BILATERAL LOW BACK PAIN WITH LEFT-SIDED SCIATICA: ICD-10-CM

## 2022-11-18 DIAGNOSIS — M54.42 CHRONIC BILATERAL LOW BACK PAIN WITH LEFT-SIDED SCIATICA: ICD-10-CM

## 2022-11-18 RX ORDER — GABAPENTIN 800 MG/1
800 TABLET ORAL 4 TIMES DAILY
Qty: 120 TABLET | Refills: 0 | Status: SHIPPED | OUTPATIENT
Start: 2022-11-18 | End: 2022-12-19 | Stop reason: SDUPTHER

## 2022-11-18 RX ORDER — MECLIZINE HYDROCHLORIDE 25 MG/1
TABLET ORAL
Qty: 60 TABLET | Refills: 1 | Status: SHIPPED | OUTPATIENT
Start: 2022-11-18

## 2022-11-18 NOTE — TELEPHONE ENCOUNTER
Caller: Raimundo Ramos    Relationship: Self    Best call back number: [phone]    Requested Prescriptions:   Requested Prescriptions     Pending Prescriptions Disp Refills   • gabapentin (NEURONTIN) 800 MG tablet 120 tablet 0     Sig: Take 1 tablet by mouth 4 (Four) Times a Day.        Pharmacy where request should be sent: [facility]/PHARMACY #[id number] - [address] [phone] [facility] [phone] FX     Additional details provided by patient: HAS 3 LEFT    Does the patient have less than a 3 day supply:  [x] Yes  [] No    Rivera Leiva Rep   11/18/22 10:12 CST

## 2022-11-18 NOTE — TELEPHONE ENCOUNTER
Rx Refill Note  Requested Prescriptions     Pending Prescriptions Disp Refills   • meclizine (ANTIVERT) 25 MG tablet [Pharmacy Med Name: MECLIZINE 25 MG TABLET] 60 tablet 1     Sig: TAKE 1 TABLET BY MOUTH 3 TIMES A DAY AS NEEDED FOR DIZZINESS.      Last office visit with prescribing clinician: 10/31/2022      Next office visit with prescribing clinician: 1/30/2023            Ericka Dyson MA  11/18/22, 16:39 CST

## 2022-11-18 NOTE — TELEPHONE ENCOUNTER
Rx Refill Note  Requested Prescriptions     Pending Prescriptions Disp Refills   • gabapentin (NEURONTIN) 800 MG tablet 120 tablet 0     Sig: Take 1 tablet by mouth 4 (Four) Times a Day.      Last office visit with prescribing clinician: 10/31/2022      Next office visit with prescribing clinician: 1/30/2023     UDS: Compliance Drug Analysis, Ur - Urine, Clean Catch (10/31/2022 11:06)      DATE OF LAST REFILL: 10/18/2022             Ericka Dyson MA  11/18/22, 10:46 CST

## 2022-11-30 DIAGNOSIS — M19.90 ARTHRITIS: ICD-10-CM

## 2022-12-01 RX ORDER — IBUPROFEN 800 MG/1
TABLET ORAL
Qty: 60 TABLET | Refills: 5 | Status: SHIPPED | OUTPATIENT
Start: 2022-12-01

## 2022-12-19 DIAGNOSIS — G89.29 CHRONIC BILATERAL LOW BACK PAIN WITH LEFT-SIDED SCIATICA: ICD-10-CM

## 2022-12-19 DIAGNOSIS — M54.42 CHRONIC BILATERAL LOW BACK PAIN WITH LEFT-SIDED SCIATICA: ICD-10-CM

## 2022-12-19 RX ORDER — GABAPENTIN 800 MG/1
800 TABLET ORAL 4 TIMES DAILY
Qty: 120 TABLET | Refills: 0 | Status: SHIPPED | OUTPATIENT
Start: 2022-12-19 | End: 2023-01-18 | Stop reason: SDUPTHER

## 2022-12-19 NOTE — TELEPHONE ENCOUNTER
Rx Refill Note  Requested Prescriptions     Pending Prescriptions Disp Refills   • gabapentin (NEURONTIN) 800 MG tablet 120 tablet 0     Sig: Take 1 tablet by mouth 4 (Four) Times a Day.   Med last filled:  11/18/22  Last office visit with prescribing clinician: 10/31/2022  Next office visit with prescribing clinician: 1/30/2023     Compliance Drug Analysis, Ur - Urine, Clean Catch (10/31/2022 11:06)                            Would you like a call back once the refill request has been completed: [] Yes [] No    If the office needs to give you a call back, can they leave a voicemail: [] Yes [] No    Unique Coronado RN  12/19/22, 08:26 CST

## 2022-12-19 NOTE — TELEPHONE ENCOUNTER
Pt called requesting refills of following. [facility]    gabapentin (NEURONTIN) 800 MG tablet [75026] (Order [id number])

## 2022-12-27 DIAGNOSIS — F33.42 RECURRENT MAJOR DEPRESSIVE DISORDER, IN FULL REMISSION: ICD-10-CM

## 2022-12-27 RX ORDER — DULOXETIN HYDROCHLORIDE 60 MG/1
60 CAPSULE, DELAYED RELEASE ORAL DAILY
Qty: 90 CAPSULE | Refills: 3 | Status: SHIPPED | OUTPATIENT
Start: 2022-12-27 | End: 2023-03-27 | Stop reason: SDUPTHER

## 2022-12-27 NOTE — TELEPHONE ENCOUNTER
Incoming Refill Request      Medication requested (name and dose): DULoxetine (CYMBALTA) 60 MG capsule     Pharmacy where request should be sent: [facility] [place]    Additional details provided by patient: PATIENT TOOK HIS LAST ONE THIS MORNING.    Best call back number: [phone]    Does the patient have less than a 3 day supply:  [x] Yes  [] No    Kiera Kothari  12/27/22, 11:25 CST          05}

## 2022-12-27 NOTE — TELEPHONE ENCOUNTER
Rx Refill Note  Requested Prescriptions     Pending Prescriptions Disp Refills   • DULoxetine (CYMBALTA) 60 MG capsule 90 capsule 3     Sig: Take 1 capsule by mouth Daily for 30 days.      Last office visit with prescribing clinician: 10/31/2022   Last telemedicine visit with prescribing clinician: 1/30/2023   Next office visit with prescribing clinician: 1/30/2023                         Would you like a call back once the refill request has been completed: [] Yes [] No    If the office needs to give you a call back, can they leave a voicemail: [] Yes [] No    Ericka Dyson MA  12/27/22, 11:58 CST

## 2023-01-06 DIAGNOSIS — R35.0 URINARY FREQUENCY: ICD-10-CM

## 2023-01-06 RX ORDER — TAMSULOSIN HYDROCHLORIDE 0.4 MG/1
CAPSULE ORAL
Qty: 30 CAPSULE | Refills: 5 | Status: SHIPPED | OUTPATIENT
Start: 2023-01-06

## 2023-01-06 NOTE — TELEPHONE ENCOUNTER
Rx Refill Note  Requested Prescriptions     Pending Prescriptions Disp Refills   • tamsulosin (FLOMAX) 0.4 MG capsule 24 hr capsule [Pharmacy Med Name: TAMSULOSIN HCL 0.4 MG CAPSULE] 30 capsule 5     Sig: TAKE 1 CAPSULE BY MOUTH EVERY DAY      Last office visit with prescribing clinician: 10/31/2022   Last telemedicine visit with prescribing clinician: 1/30/2023   Next office visit with prescribing clinician: 1/30/2023                         Would you like a call back once the refill request has been completed: [] Yes [] No    If the office needs to give you a call back, can they leave a voicemail: [] Yes [] No    Ericka Dyson MA  01/06/23, 07:58 CST

## 2023-01-18 DIAGNOSIS — M54.42 CHRONIC BILATERAL LOW BACK PAIN WITH LEFT-SIDED SCIATICA: ICD-10-CM

## 2023-01-18 DIAGNOSIS — G89.29 CHRONIC BILATERAL LOW BACK PAIN WITH LEFT-SIDED SCIATICA: ICD-10-CM

## 2023-01-18 DIAGNOSIS — G47.00 INSOMNIA, UNSPECIFIED TYPE: ICD-10-CM

## 2023-01-18 RX ORDER — QUETIAPINE FUMARATE 100 MG/1
100 TABLET, FILM COATED ORAL NIGHTLY
Qty: 90 TABLET | Refills: 1 | Status: CANCELLED | OUTPATIENT
Start: 2023-01-18

## 2023-01-18 NOTE — TELEPHONE ENCOUNTER
CALLER: Raimundo Ramos    Relationship: Self    Best call back number: [phone]    Requested Prescriptions:   Requested Prescriptions     Pending Prescriptions Disp Refills   • QUEtiapine (SEROquel) 100 MG tablet 90 tablet 1     Sig: Take 1 tablet by mouth Every Night.   • gabapentin (NEURONTIN) 800 MG tablet 120 tablet 0     Sig: Take 1 tablet by mouth 4 (Four) Times a Day.        Pharmacy where request should be sent: [facility]/PHARMACY #[id number] - [place] - [address] [facility] [phone] [facility] [phone] FX     Additional details provided by patient: will be out over the weekend      Does the patient have less than a 3 day supply:  [x] Yes  [] No    Would you like a call back once the refill request has been completed: [] Yes [x] No    If the office needs to give you a call back, can they leave a voicemail: [x] Yes [] No    Rivera Sams Rep   01/18/23 12:17 CST

## 2023-01-18 NOTE — TELEPHONE ENCOUNTER
Rx Refill Note  Requested Prescriptions     Pending Prescriptions Disp Refills   • gabapentin (NEURONTIN) 800 MG tablet 120 tablet 0     Sig: Take 1 tablet by mouth 4 (Four) Times a Day.      Last office visit with prescribing clinician: 10/31/2022      Next office visit with prescribing clinician: 1/30/2023     UDS: Compliance Drug Analysis, Ur - Urine, Clean Catch (10/31/2022 11:06)      DATE OF LAST REFILL: 12/19/2022             Ericka Dyson MA  01/18/23, 12:36 CST

## 2023-01-19 RX ORDER — GABAPENTIN 800 MG/1
800 TABLET ORAL 4 TIMES DAILY
Qty: 120 TABLET | Refills: 0 | Status: SHIPPED | OUTPATIENT
Start: 2023-01-19 | End: 2023-02-20 | Stop reason: SDUPTHER

## 2023-02-20 ENCOUNTER — TELEPHONE (OUTPATIENT)
Dept: INTERNAL MEDICINE | Facility: CLINIC | Age: 58
End: 2023-02-20
Payer: COMMERCIAL

## 2023-02-20 DIAGNOSIS — M54.42 CHRONIC BILATERAL LOW BACK PAIN WITH LEFT-SIDED SCIATICA: ICD-10-CM

## 2023-02-20 DIAGNOSIS — G89.29 CHRONIC BILATERAL LOW BACK PAIN WITH LEFT-SIDED SCIATICA: ICD-10-CM

## 2023-02-20 NOTE — TELEPHONE ENCOUNTER
Caller: Rachel Raimundo    Relationship: Self    Best call back number: [phone]    Requested Prescriptions:   Requested Prescriptions     Pending Prescriptions Disp Refills   • gabapentin (NEURONTIN) 800 MG tablet 120 tablet 0     Sig: Take 1 tablet by mouth 4 (Four) Times a Day.        Pharmacy where request should be sent: [facility]/PHARMACY #[id number] - [place] [phone] [facility] [phone] FX     Additional details provided by patient: COMPLETELY OUT    Does the patient have less than a 3 day supply:  [x] Yes  [] No    Would you like a call back once the refill request has been completed: [x] Yes [] No    If the office needs to give you a call back, can they leave a voicemail: [x] Yes [] No    Rivera Cai Rep   02/20/23 10:43 CST

## 2023-02-20 NOTE — TELEPHONE ENCOUNTER
Rx Refill Note  Requested Prescriptions     Pending Prescriptions Disp Refills   • gabapentin (NEURONTIN) 800 MG tablet 120 tablet 0     Sig: Take 1 tablet by mouth 4 (Four) Times a Day.   Med last filled:  1/19/23  Last office visit with prescribing clinician: 10/31/2022  Next office visit with prescribing clinician: 3/7/2023     Compliance Drug Analysis, Ur - Urine, Clean Catch (07/20/2022 09:57)    WILL NEED UPDATED UDS AT 3/7/23 VISIT.                           Would you like a call back once the refill request has been completed: [] Yes [] No    If the office needs to give you a call back, can they leave a voicemail: [] Yes [] No    Unique Coronado RN  02/20/23, 10:49 CST

## 2023-02-21 RX ORDER — GABAPENTIN 800 MG/1
800 TABLET ORAL 4 TIMES DAILY
Qty: 120 TABLET | Refills: 0 | Status: SHIPPED | OUTPATIENT
Start: 2023-02-21 | End: 2023-03-17 | Stop reason: SDUPTHER

## 2023-03-09 DIAGNOSIS — I10 ESSENTIAL HYPERTENSION: ICD-10-CM

## 2023-03-09 RX ORDER — LISINOPRIL 40 MG/1
TABLET ORAL
Qty: 30 TABLET | Refills: 5 | Status: SHIPPED | OUTPATIENT
Start: 2023-03-09

## 2023-03-09 NOTE — TELEPHONE ENCOUNTER
Rx Refill Note  Requested Prescriptions     Pending Prescriptions Disp Refills   • lisinopril (PRINIVIL,ZESTRIL) 40 MG tablet [Pharmacy Med Name: LISINOPRIL 40 MG TABLET] 30 tablet 5     Sig: TAKE 1 TABLET BY MOUTH EVERY DAY      Last office visit with prescribing clinician: 10/31/2022  Next office visit with prescribing clinician: Visit date not found                         Would you like a call back once the refill request has been completed: [] Yes [] No    If the office needs to give you a call back, can they leave a voicemail: [] Yes [] No    Unique Coronado RN  03/09/23, 07:01 CST

## 2023-03-17 DIAGNOSIS — M54.42 CHRONIC BILATERAL LOW BACK PAIN WITH LEFT-SIDED SCIATICA: ICD-10-CM

## 2023-03-17 DIAGNOSIS — G89.29 CHRONIC BILATERAL LOW BACK PAIN WITH LEFT-SIDED SCIATICA: ICD-10-CM

## 2023-03-17 RX ORDER — GABAPENTIN 800 MG/1
800 TABLET ORAL 4 TIMES DAILY
Qty: 120 TABLET | Refills: 0 | Status: SHIPPED | OUTPATIENT
Start: 2023-03-17

## 2023-03-17 NOTE — TELEPHONE ENCOUNTER
Rx Refill Note  Requested Prescriptions     Pending Prescriptions Disp Refills   • gabapentin (NEURONTIN) 800 MG tablet 120 tablet 0     Sig: Take 1 tablet by mouth 4 (Four) Times a Day.      Last office visit with prescribing clinician: 10/31/2022      Next office visit with prescribing clinician: 4/18/2023     UDS: Compliance Drug Analysis, Ur - Urine, Clean Catch (07/20/2022 09:57)      DATE OF LAST REFILL: 02/21/2023             Ericka Dyson MA  03/17/23, 12:02 CDT

## 2023-03-17 NOTE — TELEPHONE ENCOUNTER
Caller: Raimundo Ramos    Relationship: Self    Best call back number:  [phone]      Requested Prescriptions     Pending Prescriptions Disp Refills   • gabapentin (NEURONTIN) 800 MG tablet 120 tablet 0     Sig: Take 1 tablet by mouth 4 (Four) Times a Day.        Pharmacy where request should be sent: [facility]/PHARMACY #[id number] - [place], [place] - [address] [facility] [phone] [facility] [phone] FX     Does the patient have less than a 3 day supply:  [] Yes  [] No    Would you like a call back once the refill request has been completed: [] Yes [x] No    If the office needs to give you a call back, can they leave a voicemail: [] Yes [x] No    Rivera Nelson Rep   03/17/23 12:00 CDT

## 2023-03-21 ENCOUNTER — TELEPHONE (OUTPATIENT)
Dept: INTERNAL MEDICINE | Facility: CLINIC | Age: 58
End: 2023-03-21
Payer: COMMERCIAL

## 2023-03-27 DIAGNOSIS — F33.42 RECURRENT MAJOR DEPRESSIVE DISORDER, IN FULL REMISSION: ICD-10-CM

## 2023-03-27 RX ORDER — DULOXETIN HYDROCHLORIDE 60 MG/1
60 CAPSULE, DELAYED RELEASE ORAL DAILY
Qty: 90 CAPSULE | Refills: 3 | Status: SHIPPED | OUTPATIENT
Start: 2023-03-27 | End: 2023-04-26

## 2023-03-27 NOTE — TELEPHONE ENCOUNTER
Rx Refill Note  Requested Prescriptions     Pending Prescriptions Disp Refills   • DULoxetine (CYMBALTA) 60 MG capsule 90 capsule 3     Sig: Take 1 capsule by mouth Daily for 30 days.      Last office visit with prescribing clinician: 10/31/2022  Next office visit with prescribing clinician: 4/18/2023                         Would you like a call back once the refill request has been completed: [] Yes [] No    If the office needs to give you a call back, can they leave a voicemail: [] Yes [] No    Unique Coronado RN  03/27/23, 08:14 CDT

## 2023-03-27 NOTE — TELEPHONE ENCOUNTER
Caller: Raimundo Ramos    Relationship: Self    Best call back number: [phone]    Requested Prescriptions:   Requested Prescriptions     Pending Prescriptions Disp Refills   • DULoxetine (CYMBALTA) 60 MG capsule 90 capsule 3     Sig: Take 1 capsule by mouth Daily for 30 days.        Pharmacy where request should be sent: [facility]/PHARMACY #[id number] - [place] [phone] [facility] [phone]      Last office visit with prescribing clinician: 10/31/2022   Last telemedicine visit with prescribing clinician: 4/18/2023   Next office visit with prescribing clinician: 4/18/2023     Additional details provided by patient: PATIENT IS COMPLETELY OUT     Does the patient have less than a 3 day supply:  [x] Yes  [] No    Would you like a call back once the refill request has been completed: [] Yes [x] No    If the office needs to give you a call back, can they leave a voicemail: [x] Yes [] No    Rivera Suero Rep   03/27/23 07:53 CDT

## 2023-04-06 DIAGNOSIS — F33.42 RECURRENT MAJOR DEPRESSIVE DISORDER, IN FULL REMISSION: ICD-10-CM

## 2023-04-06 RX ORDER — DULOXETIN HYDROCHLORIDE 60 MG/1
60 CAPSULE, DELAYED RELEASE ORAL DAILY
Qty: 90 CAPSULE | Refills: 3 | OUTPATIENT
Start: 2023-04-06 | End: 2023-05-06

## 2023-04-06 NOTE — TELEPHONE ENCOUNTER
Caller: Raimundo Ramos    Relationship: Self    Best call back number: [phone]    Requested Prescriptions:   Requested Prescriptions     Pending Prescriptions Disp Refills   • DULoxetine (CYMBALTA) 60 MG capsule 90 capsule 3     Sig: Take 1 capsule by mouth Daily for 30 days.        Pharmacy where request should be sent: [facility]/PHARMACY #[id number] - [place] [phone] [facility] [phone]      Last office visit with prescribing clinician: 10/31/2022   Last telemedicine visit with prescribing clinician: 4/18/2023   Next office visit with prescribing clinician: 4/18/2023     Does the patient have less than a 3 day supply:  [x] Yes  [] No    Would you like a call back once the refill request has been completed: [x] Yes [] No    If the office needs to give you a call back, can they leave a voicemail: [x] Yes [] No    Rivera Holt Rep   04/06/23 11:28 CDT

## 2023-04-06 NOTE — TELEPHONE ENCOUNTER
Rx Refill Note  Requested Prescriptions     Refused Prescriptions Disp Refills   • DULoxetine (CYMBALTA) 60 MG capsule 90 capsule 3     Sig: Take 1 capsule by mouth Daily for 30 days.      Last office visit with prescribing clinician: 10/31/2022   Last telemedicine visit with prescribing clinician: 4/18/2023   Next office visit with prescribing clinician: 4/18/2023       PATIENT NOTIFIED IT WAS ALREADY SENT TO PHARMACY.                     Would you like a call back once the refill request has been completed: [] Yes [] No    If the office needs to give you a call back, can they leave a voicemail: [] Yes [] No    Ericka Dyson MA  04/06/23, 11:31 CDT

## 2023-04-11 DIAGNOSIS — G47.00 INSOMNIA, UNSPECIFIED TYPE: ICD-10-CM

## 2023-04-11 RX ORDER — QUETIAPINE FUMARATE 100 MG/1
TABLET, FILM COATED ORAL
Qty: 90 TABLET | Refills: 1 | Status: SHIPPED | OUTPATIENT
Start: 2023-04-11

## 2023-04-11 NOTE — TELEPHONE ENCOUNTER
Rx Refill Note  Requested Prescriptions     Pending Prescriptions Disp Refills   • QUEtiapine (SEROquel) 100 MG tablet [Pharmacy Med Name: QUETIAPINE FUMARATE 100 MG TAB] 90 tablet 1     Sig: TAKE 1 TABLET BY MOUTH EVERY DAY AT NIGHT      Last office visit with prescribing clinician: 10/31/2022   Next office visit with prescribing clinician: 4/18/2023                         Would you like a call back once the refill request has been completed: [] Yes [] No    If the office needs to give you a call back, can they leave a voicemail: [] Yes [] No    Unique Coronado RN  04/11/23, 07:11 CDT

## 2023-04-18 ENCOUNTER — OFFICE VISIT (OUTPATIENT)
Dept: INTERNAL MEDICINE | Facility: CLINIC | Age: 58
End: 2023-04-18
Payer: COMMERCIAL

## 2023-04-18 VITALS
HEIGHT: 71 IN | SYSTOLIC BLOOD PRESSURE: 156 MMHG | OXYGEN SATURATION: 96 % | DIASTOLIC BLOOD PRESSURE: 88 MMHG | HEART RATE: 88 BPM | BODY MASS INDEX: 37.66 KG/M2 | WEIGHT: 269 LBS | TEMPERATURE: 97.8 F

## 2023-04-18 DIAGNOSIS — Z79.899 LONG TERM USE OF DRUG: Primary | ICD-10-CM

## 2023-04-18 DIAGNOSIS — L98.9 SKIN LESION: ICD-10-CM

## 2023-04-18 DIAGNOSIS — G89.29 CHRONIC BILATERAL LOW BACK PAIN WITH LEFT-SIDED SCIATICA: ICD-10-CM

## 2023-04-18 DIAGNOSIS — G89.29 CHRONIC MIDLINE LOW BACK PAIN WITH RIGHT-SIDED SCIATICA: ICD-10-CM

## 2023-04-18 DIAGNOSIS — M54.41 CHRONIC MIDLINE LOW BACK PAIN WITH RIGHT-SIDED SCIATICA: ICD-10-CM

## 2023-04-18 DIAGNOSIS — M54.42 CHRONIC BILATERAL LOW BACK PAIN WITH LEFT-SIDED SCIATICA: ICD-10-CM

## 2023-04-18 RX ORDER — GABAPENTIN 800 MG/1
800 TABLET ORAL 4 TIMES DAILY
Qty: 120 TABLET | Refills: 0 | Status: SHIPPED | OUTPATIENT
Start: 2023-04-18

## 2023-04-18 NOTE — PROGRESS NOTES
Subjective     Chief Complaint   Patient presents with   • Back Pain   • Insomnia       History of Present Illness   Back Pain  This is a chronic problem. The current episode started more than 1 year ago. The problem occurs 2 to 4 times per day. The problem has been rapidly worsening since onset. The pain is present in the lumbar spine. The quality of the pain is described as aching, stabbing and shooting. The pain is moderate. The pain is worse during the day. The symptoms are aggravated by bending. Stiffness is present in the morning. Pertinent negatives include no chest pain, dysuria or fever. Risk factors include sedentary lifestyle. He has tried muscle relaxant for the symptoms. The treatment provided mild relief.   BP is elevated but better than previous.     Patient states that he is doing about as well as he can.   He states that his anxiety is making him crazy and eating him alive.     Patient's PMR from outside medical facility reviewed and noted.    Review of Systems   Constitutional: Negative for chills and fever.   HENT: Negative for congestion and rhinorrhea.    Respiratory: Negative for cough and shortness of breath.    Cardiovascular: Negative for chest pain and leg swelling.   Gastrointestinal: Negative for constipation and diarrhea.   Genitourinary: Negative for dysuria and hematuria.   Psychiatric/Behavioral: Negative for confusion. The patient is nervous/anxious.       Otherwise complete ROS reviewed and negative except as mentioned in the HPI.    Past Medical History:   Past Medical History:   Diagnosis Date   • Anxiety    • Back pain    • Chronic pain    • Depression    • Elevated cholesterol    • Hypertension      Past Surgical History:  Past Surgical History:   Procedure Laterality Date   • WOUND CLOSURE Right 5/28/2019    Procedure: Complex closure of open scalp wound avulsion defect 7x 4.5 cm with rotation/ advancement flap closure 9cm x 5 cm;  Surgeon: Fam Cardoso MD;   Location: [facility] OR;  Service: ENT     Social History:  reports that he has been smoking cigarettes. He has a 19.00 pack-year smoking history. He has never used smokeless tobacco. He reports that he does not currently use alcohol. He reports that he does not use drugs.    Family History: family history includes Diabetes in his mother; Hyperlipidemia in his mother; Hypertension in his father and mother; Stroke in his maternal grandfather.       Allergies:  No Known Allergies  Medications:  Prior to Admission medications    Medication Sig Start Date End Date Taking? Authorizing Provider   albuterol (PROVENTIL) 2.5 MG/0.5ML nebulizer solution Take 2.5 mg by nebulization Every 6 (Six) Hours As Needed for Wheezing. 3/3/22  Yes Nella Flores DO   albuterol sulfate  (90 Base) MCG/ACT inhaler Inhale 2 puffs Every 4 (Four) Hours As Needed for Wheezing. 6/17/21  Yes Nella Flores DO   amLODIPine (NORVASC) 5 MG tablet Take 1 tablet by mouth Daily. 7/4/21  Yes Janie Bueno MD   atomoxetine (STRATTERA) 60 MG capsule Take 1 capsule by mouth Daily. 4/20/22  Yes Nella Flores DO   buprenorphine-naloxone (SUBOXONE) 8-2 MG per SL tablet Place 1 tablet under the tongue Daily.   Yes ProviderJanie MD   buPROPion XL (WELLBUTRIN XL) 150 MG 24 hr tablet Take 1 tablet by mouth Daily. 9/15/22  Yes Nella Flores DO   clotrimazole-betamethasone (Lotrisone) 1-0.05 % cream Apply  topically to the appropriate area as directed 2 (Two) Times a Day. 2/8/21  Yes Nella Flores DO   [employer] Allergy Relief-D12 5-120 MG per 12 hr tablet TAKE 1 TABLET BY MOUTH 2 (TWO) TIMES A DAY AS NEEDED FOR ALLERGIES. 1/27/22  Yes Nella Flores DO   cyclobenzaprine (FLEXERIL) 10 MG tablet TAKE 1 TABLET BY MOUTH 3 TIMES A DAY AS NEEDED FOR MUSCLE SPASMS. 9/15/22  Yes Nella Flores DO   DULoxetine (CYMBALTA) 60 MG capsule Take 1 capsule by mouth Daily for 30 days. 3/27/23 4/26/23 Yes Nella Flores, DO    fluticasone (FLONASE) 50 MCG/ACT nasal spray 2 sprays into the nostril(s) as directed by provider Daily. 2/15/21  Yes Nella Flores DO   gabapentin (NEURONTIN) 800 MG tablet Take 1 tablet by mouth 4 (Four) Times a Day. 3/17/23  Yes Nella Flores DO   guaiFENesin-codeine (GUAIFENESIN AC) 100-10 MG/5ML liquid Take 5 mL by mouth 3 (Three) Times a Day As Needed for Cough. 9/20/22  Yes Nella Flores DO   guaifenesin-dextromethorphan (MUCINEX DM)  MG tablet sustained-release 12 hour tablet Take 1 tablet by mouth 2 (Two) Times a Day As Needed (sinus congestion). 3/31/22  Yes Nella Flores DO   ibuprofen (ADVIL,MOTRIN) 800 MG tablet TAKE 1 TABLET BY MOUTH TWICE A DAY 12/1/22  Yes Nella Flores DO   levoFLOXacin (Levaquin) 750 MG tablet Take 1 tablet by mouth Daily. 9/20/22  Yes Nella Flores DO   lisinopril (PRINIVIL,ZESTRIL) 40 MG tablet TAKE 1 TABLET BY MOUTH EVERY DAY 3/9/23  Yes Nella Flores DO   Loratadine (CLARITIN PO) Take 10 mg by mouth Daily.   Yes ProviderJanie MD   meclizine (ANTIVERT) 25 MG tablet TAKE 1 TABLET BY MOUTH 3 TIMES A DAY AS NEEDED FOR DIZZINESS. 11/18/22  Yes Nella Flores DO   methylPREDNISolone (MEDROL) 4 MG dose pack Take 1 tablet by mouth Daily. Take as directed on package instructions. 9/20/22  Yes Nella Flores DO   mupirocin (BACTROBAN) 2 % ointment Apply 1 application topically to the appropriate area as directed 3 (Three) Times a Day. 4/20/22  Yes Nella Flores DO   Omega-3 Fatty Acids (FISH OIL) 1000 MG capsule capsule Take 1 capsule by mouth Daily With Breakfast.   Yes ProviderJanie MD   polyethylene glycol (GoLYTELY) 236 g solution Take as directed by office instructions. 6/22/21  Yes Cesar, Sonia Felisa, APRN   QUEtiapine (SEROquel) 100 MG tablet TAKE 1 TABLET BY MOUTH EVERY DAY AT NIGHT 4/11/23  Yes Nella Flores DO   sulfamethoxazole-trimethoprim (Bactrim DS) 800-160 MG per tablet Take 1 tablet  "by mouth 2 (Two) Times a Day. 4/20/22  Yes Nella Flores DO   tamsulosin (FLOMAX) 0.4 MG capsule 24 hr capsule TAKE 1 CAPSULE BY MOUTH EVERY DAY 1/6/23  Yes Nella Flores DO   mupirocin (BACTROBAN) 2 % ointment Apply 1 application topically to the appropriate area as directed 3 (Three) Times a Day.  Patient not taking: Reported on 4/18/2023 4/20/22 4/18/23  Nella Flores DO       Objective     Vital Signs: /88 (BP Location: Left arm, Patient Position: Sitting, Cuff Size: Adult)   Pulse 88   Temp 97.8 °F (36.6 °C) (Infrared)   Ht 180.3 cm (71\")   Wt 122 kg (269 lb)   SpO2 96%   BMI 37.52 kg/m²   Physical Exam  Vitals reviewed.   Constitutional:       Appearance: He is obese.   HENT:      Head: Normocephalic and atraumatic.      Right Ear: External ear normal.      Left Ear: External ear normal.      Nose: Nose normal.   Eyes:      General: No scleral icterus.     Conjunctiva/sclera: Conjunctivae normal.   Cardiovascular:      Rate and Rhythm: Normal rate and regular rhythm.      Heart sounds: Normal heart sounds.   Pulmonary:      Effort: Pulmonary effort is normal.      Breath sounds: Normal breath sounds.   Musculoskeletal:         General: No swelling or tenderness.      Cervical back: Normal range of motion and neck supple.   Skin:     General: Skin is warm and dry.      Comments: Skin lesion on his left cheek.    Neurological:      General: No focal deficit present.      Mental Status: He is alert.      Cranial Nerves: No cranial nerve deficit.   Psychiatric:         Mood and Affect: Mood normal.         Behavior: Behavior normal.       Class 2 Severe Obesity (BMI >=35 and <=39.9). Obesity-related health conditions include the following: hypertension. Obesity is unchanged. BMI is is above average; BMI management plan is completed. We discussed portion control and increasing exercise.      Results Reviewed:  Glucose   Date Value Ref Range Status   08/23/2022 73 65 - 99 mg/dL Final "   02/18/2020 79 74 - 109 mg/dL Final     BUN   Date Value Ref Range Status   08/23/2022 16 6 - 24 mg/dL Final   02/18/2020 20 6 - 20 mg/dL Final     Creatinine   Date Value Ref Range Status   08/23/2022 0.82 0.76 - 1.27 mg/dL Final   02/18/2020 0.7 0.5 - 1.2 mg/dL Final     Sodium   Date Value Ref Range Status   08/23/2022 139 134 - 144 mmol/L Final   02/18/2020 140 136 - 145 mmol/L Final     Potassium   Date Value Ref Range Status   08/23/2022 5.2 3.5 - 5.2 mmol/L Final   02/18/2020 4.4 3.5 - 5.0 mmol/L Final     Chloride   Date Value Ref Range Status   08/23/2022 98 96 - 106 mmol/L Final   02/18/2020 102 98 - 111 mmol/L Final     CO2   Date Value Ref Range Status   02/18/2020 24 22 - 29 mmol/L Final     Total CO2   Date Value Ref Range Status   08/23/2022 28 20 - 29 mmol/L Final     Calcium   Date Value Ref Range Status   08/23/2022 9.3 8.7 - 10.2 mg/dL Final   02/18/2020 9.4 8.6 - 10.0 mg/dL Final     ALT (SGPT)   Date Value Ref Range Status   08/23/2022 15 0 - 44 IU/L Final   02/18/2020 8 5 - 41 U/L Final     AST (SGOT)   Date Value Ref Range Status   08/23/2022 14 0 - 40 IU/L Final   02/18/2020 12 5 - 40 U/L Final     WBC   Date Value Ref Range Status   08/23/2022 9.1 3.4 - 10.8 x10E3/uL Final   02/18/2020 7.5 4.8 - 10.8 K/uL Final     Hematocrit   Date Value Ref Range Status   08/23/2022 36.0 (L) 37.5 - 51.0 % Final   02/18/2020 39.5 (L) 42.0 - 52.0 % Final     Platelets   Date Value Ref Range Status   08/23/2022 281 150 - 450 x10E3/uL Final   02/18/2020 281 130 - 400 K/uL Final     Triglycerides   Date Value Ref Range Status   08/23/2022 119 0 - 149 mg/dL Final   02/18/2020 98 0 - 149 mg/dL Final     HDL Cholesterol   Date Value Ref Range Status   08/23/2022 49 >39 mg/dL Final   02/18/2020 44 (L) 55 - 121 mg/dL Final     Comment:     VALUES>60 MG/DL ARE ASSOCIATED WITH A DECREASED RISK OF  ATHEROSCLEROTIC CARDIOVASCULAR DISEASE     LDL Cholesterol    Date Value Ref Range Status   02/18/2020 77 <100 mg/dL  Final     Comment:     <100 MG/DL=OPITIMAL    100-129 MG/DL=DESIRABLE    130-159 MG/DL BORDERLINE=INCREASED RISK OF ATHEROSCLEROTIC  CARDIOVASCULAR DISEASE    > OR = 160 MG/DL=ASSOCIATED WITH AN INCREASE RISK OF  ATHEROSCLEROTIC CARDIOVASCULAR DISEASE     LDL Chol Calc ([facility])   Date Value Ref Range Status   08/23/2022 95 0 - 99 mg/dL Final       Assessment / Plan     Assessment/Plan:  1. Long term use of drug  - ToxASSURE Select 13 (MW) - Urine, Clean Catch    2. Skin lesion  - Ambulatory Referral to Dermatology    3. Chronic midline low back pain with right-sided sciatica  - Refilled Neurontin today.  - Patient is controlled on his current therapy.     4. Hypertension  - Improved from prior visit. Will continue to follow.       Return in about 3 months (around 7/18/2023) for Recheck, Next scheduled follow up. unless patient needs to be seen sooner or acute issues arise.    Code Status: Full    I have discussed the patient results/orders and and plan/recommendation with them at today's visit.      Nella Flores, DO   04/18/2023

## 2023-04-25 LAB
DRUGS UR: NORMAL
Lab: NORMAL

## 2023-05-15 DIAGNOSIS — M19.90 ARTHRITIS: ICD-10-CM

## 2023-05-15 RX ORDER — IBUPROFEN 800 MG/1
TABLET ORAL
Qty: 60 TABLET | Refills: 5 | Status: SHIPPED | OUTPATIENT
Start: 2023-05-15

## 2023-05-15 NOTE — TELEPHONE ENCOUNTER
Rx Refill Note  Requested Prescriptions     Pending Prescriptions Disp Refills   • ibuprofen (ADVIL,MOTRIN) 800 MG tablet [Pharmacy Med Name: IBUPROFEN 800 MG TABLET] 60 tablet 5     Sig: TAKE 1 TABLET BY MOUTH TWICE A DAY      Last office visit with prescribing clinician: 4/18/2023   Next office visit with prescribing clinician: 9/25/2023                         Would you like a call back once the refill request has been completed: [] Yes [] No    If the office needs to give you a call back, can they leave a voicemail: [] Yes [] No    Unique Coronado RN  05/15/23, 11:26 CDT

## 2023-05-17 DIAGNOSIS — G89.29 CHRONIC BILATERAL LOW BACK PAIN WITH LEFT-SIDED SCIATICA: ICD-10-CM

## 2023-05-17 DIAGNOSIS — M54.42 CHRONIC BILATERAL LOW BACK PAIN WITH LEFT-SIDED SCIATICA: ICD-10-CM

## 2023-05-17 RX ORDER — GABAPENTIN 800 MG/1
800 TABLET ORAL 4 TIMES DAILY
Qty: 120 TABLET | Refills: 0 | Status: SHIPPED | OUTPATIENT
Start: 2023-05-17

## 2023-05-17 NOTE — TELEPHONE ENCOUNTER
Rx Refill Note  Requested Prescriptions     Pending Prescriptions Disp Refills    gabapentin (NEURONTIN) 800 MG tablet 120 tablet 0     Sig: Take 1 tablet by mouth 4 (Four) Times a Day.      Last office visit with prescribing clinician: 4/18/2023      Next office visit with prescribing clinician: 9/25/2023     UDS: ToxASSURE Select 13 (MW) - Urine, Clean Catch (04/17/2023 23:00)     DATE OF LAST REFILL: 04/18/2023             Ericka Dyson MA  05/17/23, 08:50 CDT

## 2023-05-17 NOTE — TELEPHONE ENCOUNTER
Caller: Raimundo Ramos    Relationship: Self    Best call back number: [phone]    Requested Prescriptions:   Requested Prescriptions     Pending Prescriptions Disp Refills   • gabapentin (NEURONTIN) 800 MG tablet 120 tablet 0     Sig: Take 1 tablet by mouth 4 (Four) Times a Day.        Pharmacy where request should be sent:      Last office visit with prescribing clinician: 4/18/2023   Last telemedicine visit with prescribing clinician: 5/15/2023   Next office visit with prescribing clinician: 9/25/2023     Additional details provided by patient:     Does the patient have less than a 3 day supply:  [x] Yes  [] No    Would you like a call back once the refill request has been completed: [] Yes [x] No    If the office needs to give you a call back, can they leave a voicemail: [] Yes [x] No    Rivera Sol Rep   05/17/23 08:47 CDT

## 2023-05-24 DIAGNOSIS — F33.42 RECURRENT MAJOR DEPRESSIVE DISORDER, IN FULL REMISSION: ICD-10-CM

## 2023-05-24 RX ORDER — DULOXETIN HYDROCHLORIDE 60 MG/1
60 CAPSULE, DELAYED RELEASE ORAL DAILY
Qty: 90 CAPSULE | Refills: 3 | OUTPATIENT
Start: 2023-05-24 | End: 2023-06-23

## 2023-05-24 NOTE — TELEPHONE ENCOUNTER
Caller: Raimundo Ramos    Relationship: Self    Best call back number:  [phone]    Requested Prescriptions:   Requested Prescriptions     Pending Prescriptions Disp Refills   • DULoxetine (CYMBALTA) 60 MG capsule 90 capsule 3     Sig: Take 1 capsule by mouth Daily for 30 days.        Pharmacy where request should be sent: [facility]/PHARMACY #[id number] - [place], [place] - [address] [facility] [phone] [facility] [phone]      Last office visit with prescribing clinician: 4/18/2023   Last telemedicine visit with prescribing clinician: Visit date not found   Next office visit with prescribing clinician: 9/25/2023     Additional details provided by patient: PATIENT HASN'T HAD MEDICATION IN 2 DAYS    Does the patient have less than a 3 day supply:  [x] Yes  [] No    Would you like a call back once the refill request has been completed: [] Yes [] No    If the office needs to give you a call back, can they leave a voicemail: [] Yes [] No    Rivera Moore Rep   05/24/23 16:03 CDT

## 2023-05-24 NOTE — TELEPHONE ENCOUNTER
Rx Refill Note  Requested Prescriptions     Refused Prescriptions Disp Refills    DULoxetine (CYMBALTA) 60 MG capsule 90 capsule 3     Sig: Take 1 capsule by mouth Daily for 30 days.      Last office visit with prescribing clinician: 4/18/2023   Last telemedicine visit with prescribing clinician: Visit date not found   Next office visit with prescribing clinician: 9/25/2023       Refills left on Rx. Patient checking with pharmacy.                       Would you like a call back once the refill request has been completed: [] Yes [] No    If the office needs to give you a call back, can they leave a voicemail: [] Yes [] No    Ericka Dyson MA  05/24/23, 16:08 CDT

## 2023-06-19 DIAGNOSIS — G89.29 CHRONIC BILATERAL LOW BACK PAIN WITH LEFT-SIDED SCIATICA: ICD-10-CM

## 2023-06-19 DIAGNOSIS — M54.42 CHRONIC BILATERAL LOW BACK PAIN WITH LEFT-SIDED SCIATICA: ICD-10-CM

## 2023-06-19 RX ORDER — GABAPENTIN 800 MG/1
800 TABLET ORAL 4 TIMES DAILY
Qty: 120 TABLET | Refills: 0 | Status: SHIPPED | OUTPATIENT
Start: 2023-06-19

## 2023-06-19 NOTE — TELEPHONE ENCOUNTER
Rx Refill Note  Requested Prescriptions     Pending Prescriptions Disp Refills    gabapentin (NEURONTIN) 800 MG tablet 120 tablet 0     Sig: Take 1 tablet by mouth 4 (Four) Times a Day.   Med last filled:  5/17/23  Last office visit with prescribing clinician: 4/18/2023   Next office visit with prescribing clinician: 9/25/2023     ToxASSURE Select 13 (MW) - Urine, Clean Catch (04/17/2023 23:00)                           Would you like a call back once the refill request has been completed: [] Yes [] No    If the office needs to give you a call back, can they leave a voicemail: [] Yes [] No    Unique Coronado RN  06/19/23, 12:35 CDT

## 2023-06-19 NOTE — TELEPHONE ENCOUNTER
Caller: Raimundo Ramos    Relationship: Self    Best call back number: [phone]     Requested Prescriptions:   Requested Prescriptions     Pending Prescriptions Disp Refills    gabapentin (NEURONTIN) 800 MG tablet 120 tablet 0     Sig: Take 1 tablet by mouth 4 (Four) Times a Day.        Pharmacy where request should be sent: [facility]/PHARMACY #[id number] - [place] [phone] [facility] [phone]      Last office visit with prescribing clinician: 4/18/2023   Last telemedicine visit with prescribing clinician: Visit date not found   Next office visit with prescribing clinician: 9/25/2023     Additional details provided by patient: COMPLETELY OUT    Does the patient have less than a 3 day supply:  [x] Yes  [] No    Would you like a call back once the refill request has been completed: [x] Yes [] No      Rivera Humphreys Rep   06/19/23 10:58 CDT

## 2023-07-25 DIAGNOSIS — Z79.899 LONG TERM USE OF DRUG: Primary | ICD-10-CM

## 2023-07-26 ENCOUNTER — LAB (OUTPATIENT)
Dept: INTERNAL MEDICINE | Facility: CLINIC | Age: 58
End: 2023-07-26
Payer: COMMERCIAL

## 2023-07-26 DIAGNOSIS — Z79.899 LONG TERM USE OF DRUG: ICD-10-CM

## 2023-08-03 LAB — DRUGS UR: NORMAL

## 2023-08-21 DIAGNOSIS — F33.42 RECURRENT MAJOR DEPRESSIVE DISORDER, IN FULL REMISSION: ICD-10-CM

## 2023-08-21 RX ORDER — DULOXETIN HYDROCHLORIDE 60 MG/1
60 CAPSULE, DELAYED RELEASE ORAL DAILY
Qty: 90 CAPSULE | Refills: 3 | OUTPATIENT
Start: 2023-08-21 | End: 2023-09-20

## 2023-08-21 NOTE — TELEPHONE ENCOUNTER
Caller: Raimundo Ramos    Relationship: Self    Best call back number: [phone]     Requested Prescriptions:   Requested Prescriptions     Pending Prescriptions Disp Refills    DULoxetine (CYMBALTA) 60 MG capsule 90 capsule 3     Sig: Take 1 capsule by mouth Daily for 30 days.        Pharmacy where request should be sent: [facility]/PHARMACY #[id number] - [place], [place] - [address] [facility] [phone] [facility] [phone]      Last office visit with prescribing clinician: 4/18/2023   Last telemedicine visit with prescribing clinician: Visit date not found   Next office visit with prescribing clinician: 9/25/2023     Additional details provided by patient: COMPLETELY OUT    Does the patient have less than a 3 day supply:  [x] Yes  [] No    Would you like a call back once the refill request has been completed: [] Yes [x] No    If the office needs to give you a call back, can they leave a voicemail: [] Yes [x] No    Rivera Cai Rep   08/21/23 11:05 CDT

## 2023-08-21 NOTE — TELEPHONE ENCOUNTER
Rx Refill Note       Last office visit with prescribing clinician: 4/18/2023     Next office visit with prescribing clinician: 9/25/2023     Radha Parker, ANTHONY  08/21/23, 11:55 CDT

## 2023-08-28 RX ORDER — DULOXETIN HYDROCHLORIDE 60 MG/1
60 CAPSULE, DELAYED RELEASE ORAL DAILY
Qty: 30 CAPSULE | Refills: 0 | Status: SHIPPED | OUTPATIENT
Start: 2023-08-28 | End: 2023-09-27

## 2023-09-26 ENCOUNTER — OFFICE VISIT (OUTPATIENT)
Dept: INTERNAL MEDICINE | Facility: CLINIC | Age: 58
End: 2023-09-26
Payer: COMMERCIAL

## 2023-09-26 VITALS
DIASTOLIC BLOOD PRESSURE: 85 MMHG | SYSTOLIC BLOOD PRESSURE: 143 MMHG | TEMPERATURE: 97 F | OXYGEN SATURATION: 96 % | HEART RATE: 85 BPM | HEIGHT: 71 IN | WEIGHT: 276 LBS | BODY MASS INDEX: 38.64 KG/M2

## 2023-09-26 DIAGNOSIS — M46.1 SACROILIITIS: Primary | ICD-10-CM

## 2023-09-26 DIAGNOSIS — F33.42 RECURRENT MAJOR DEPRESSIVE DISORDER, IN FULL REMISSION: ICD-10-CM

## 2023-09-26 DIAGNOSIS — J40 BRONCHITIS: ICD-10-CM

## 2023-09-26 DIAGNOSIS — I10 ESSENTIAL HYPERTENSION: ICD-10-CM

## 2023-09-26 DIAGNOSIS — R42 DIZZINESS: ICD-10-CM

## 2023-09-26 DIAGNOSIS — R35.0 URINARY FREQUENCY: ICD-10-CM

## 2023-09-26 DIAGNOSIS — G47.00 INSOMNIA, UNSPECIFIED TYPE: ICD-10-CM

## 2023-09-26 DIAGNOSIS — Z12.5 SCREENING PSA (PROSTATE SPECIFIC ANTIGEN): ICD-10-CM

## 2023-09-26 DIAGNOSIS — M19.90 ARTHRITIS: ICD-10-CM

## 2023-09-26 RX ORDER — QUETIAPINE FUMARATE 100 MG/1
100 TABLET, FILM COATED ORAL
Qty: 90 TABLET | Refills: 3 | Status: SHIPPED | OUTPATIENT
Start: 2023-09-26

## 2023-09-26 RX ORDER — TAMSULOSIN HYDROCHLORIDE 0.4 MG/1
1 CAPSULE ORAL DAILY
Qty: 90 CAPSULE | Refills: 3 | Status: SHIPPED | OUTPATIENT
Start: 2023-09-26

## 2023-09-26 RX ORDER — LORATADINE/PSEUDOEPHEDRINE 5 MG-120MG
1 TABLET, EXTENDED RELEASE 12 HR ORAL 2 TIMES DAILY
Qty: 180 TABLET | Refills: 3 | Status: SHIPPED | OUTPATIENT
Start: 2023-09-26

## 2023-09-26 RX ORDER — CARBAMAZEPINE 100 MG/1
100 TABLET, EXTENDED RELEASE ORAL 2 TIMES DAILY
Qty: 60 TABLET | Refills: 11 | Status: SHIPPED | OUTPATIENT
Start: 2023-09-26

## 2023-09-26 RX ORDER — ALBUTEROL SULFATE 90 UG/1
2 AEROSOL, METERED RESPIRATORY (INHALATION) EVERY 4 HOURS PRN
Qty: 18 G | Refills: 11 | Status: SHIPPED | OUTPATIENT
Start: 2023-09-26

## 2023-09-26 RX ORDER — IBUPROFEN 800 MG/1
800 TABLET ORAL 2 TIMES DAILY
Qty: 180 TABLET | Refills: 3 | Status: SHIPPED | OUTPATIENT
Start: 2023-09-26

## 2023-09-26 RX ORDER — LISINOPRIL 40 MG/1
40 TABLET ORAL DAILY
Qty: 90 TABLET | Refills: 3 | Status: SHIPPED | OUTPATIENT
Start: 2023-09-26

## 2023-09-26 RX ORDER — DULOXETIN HYDROCHLORIDE 60 MG/1
60 CAPSULE, DELAYED RELEASE ORAL DAILY
Qty: 90 CAPSULE | Refills: 3 | Status: SHIPPED | OUTPATIENT
Start: 2023-09-26

## 2023-09-26 NOTE — PROGRESS NOTES
"Chief Complaint  Back Pain and Insomnia    Subjective        Raimundo Ramos presents to [facility]  History of Present Illness    Raimundo Ramos is a 58 y.o. male who presents for a routine visit at this time. Patient comes in today for follow-up of elevated blood pressure.  he is not exercising and is not adherent to a low-salt diet.  Blood pressure is not well controlled at home. Cardiac symptoms: none. Patient denies: chest pressure/discomfort, claudication, and exertional chest pressure/discomfort.  We will go ahead and do his routine labs to evaluate this at this time include a comprehensive metabolic panel and a lipid profile.    Ports that his generalized osteoarthritis continues to do well with his current dose of medication.  Patient states that insomnia continues to do well on his Seroquel and has chronic COPD and bronchitis continues to do well with his albuterol.  Patient likewise states his allergies continue to do well with his current dose of allergy medication.  anxiety and depression well controlled with his Cymbalta at this time.  Urinary retention well controlled with his current dose of Flomax and will do a screening PSA for this at this time.    Patient came in today requesting refills of his Neurontin.    Patient is advised that importing to [place] revised statute 314.131 title 201 chapter 20 regulation 65 \"  buprenorphine shall not be prescribed to a patient who is also being prescribed benzodiazepines, other sedative  hypnotics, stimulants, or other opioids, without consultation of:  1. A physician who is certified by the American Board of Addiction Medicine, the American Board of  Preventive Medicine, the American Board of Medical Specialties (ABMS) in  psychiatry, or an American Osteopathic Association (AOA) certifying board in  addiction medicine or psychiatry  2. An APRN who is certified in addiction therapy by the:  a. Addictions Nursing Certification " "Board;  b. American Academy of Health Care Providers in the Addictive Disorders; or  c. National Certification Commission for Addiction Professionals; or  3. A psychiatric-mental health nurse practitioner.    (b) A licensee may prescribe, dispense, or administer Buprenorphine-Mono-Product or  Buprenorphine-Combined-with-Naloxone to a patient who is also being prescribed  benzodiazepines, other sedative hypnotics, stimulants, or other opioids, without  consultation in order to address an extraordinary and acute medical need not to exceed  a combined period of thirty (30) days    Patient advised that such documentation of consultation from one of the above, stating that additional controlled substances will assist with the patients care and not pose a risk for continued substance abuse/diversion, and that alternative non controlled measures have been tried and failed.  Patient is advised that we can provide up to 30 days until they will obtain said documentation.    Patient agreed at this time to come off of his Neurontin and we will switch him over at this time to Tegretol to see if it will be effective in controlling his pain.  Patient advised that should this work we will be able to slowly increase dose if tolerated.    Raimundo Ramos  reports that he has been smoking cigarettes. He has a 19.00 pack-year smoking history. He has never used smokeless tobacco.. I have educated him on the risk of diseases from using tobacco products such as cancer, COPD, and heart disease.     I advised him to quit and he is not willing to quit.    I spent 5 minutes counseling the patient.          Objective   Vital Signs:  /85 (BP Location: Left arm, Patient Position: Sitting, Cuff Size: Adult)   Pulse 85   Temp 97 °F (36.1 °C) (Infrared)   Ht 180.3 cm (71\")   Wt 125 kg (276 lb)   SpO2 96%   BMI 38.49 kg/m²   Estimated body mass index is 38.49 kg/m² as calculated from the following:    Height as of this encounter: 180.3 cm " "(71\").    Weight as of this encounter: 125 kg (276 lb).               Physical Exam  Vitals and nursing note reviewed.   Constitutional:       General: He is not in acute distress.     Appearance: Normal appearance.   HENT:      Head: Normocephalic.   Eyes:      Extraocular Movements: Extraocular movements intact.      Pupils: Pupils are equal, round, and reactive to light.   Cardiovascular:      Rate and Rhythm: Normal rate and regular rhythm.      Heart sounds: Normal heart sounds. No murmur heard.  Pulmonary:      Effort: Pulmonary effort is normal. No respiratory distress.      Breath sounds: Normal breath sounds. No rhonchi or rales.   Abdominal:      General: Abdomen is flat. Bowel sounds are normal.      Palpations: Abdomen is soft.   Neurological:      General: No focal deficit present.      Mental Status: He is alert.      Result Review :                   Assessment and Plan   Diagnoses and all orders for this visit:    1. Sacroiliitis (Primary)  -     carBAMazepine XR (TEGretol-XR) 100 MG 12 hr tablet; Take 1 tablet by mouth 2 (Two) Times a Day.  Dispense: 60 tablet; Refill: 11    2. Insomnia, unspecified type  -     QUEtiapine (SEROquel) 100 MG tablet; Take 1 tablet by mouth every night at bedtime.  Dispense: 90 tablet; Refill: 3    3. Bronchitis  -     albuterol sulfate  (90 Base) MCG/ACT inhaler; Inhale 2 puffs Every 4 (Four) Hours As Needed for Wheezing.  Dispense: 18 g; Refill: 11    4. Recurrent major depressive disorder, in full remission  -     DULoxetine (CYMBALTA) 60 MG capsule; Take 1 capsule by mouth Daily.  Dispense: 90 capsule; Refill: 3    5. Arthritis  -     ibuprofen (ADVIL,MOTRIN) 800 MG tablet; Take 1 tablet by mouth 2 (Two) Times a Day.  Dispense: 180 tablet; Refill: 3    6. Essential hypertension  -     lisinopril (PRINIVIL,ZESTRIL) 40 MG tablet; Take 1 tablet by mouth Daily.  Dispense: 90 tablet; Refill: 3  -     CBC & Differential; Future  -     Comprehensive Metabolic Panel; " Future  -     Lipid Panel; Future    7. Dizziness    8. Urinary frequency  -     tamsulosin (FLOMAX) 0.4 MG capsule 24 hr capsule; Take 1 capsule by mouth Daily.  Dispense: 90 capsule; Refill: 3    9. Screening PSA (prostate specific antigen)  -     PSA Screen; Future    Other orders  -     loratadine-pseudoephedrine ([employer] Allergy Relief-D12) 5-120 MG per 12 hr tablet; Take 1 tablet by mouth 2 (Two) Times a Day.  Dispense: 180 tablet; Refill: 3      As part of this patient's treatment plan, I am prescribing controlled substances. The patient has been made aware of appropriate use of such medications, including potential risk of somnolence, limited ability to drive and /or work safely, and potential for dependence or overdose. It has also been made clear that these medications are for use by this patient only, without concomitant use of alcohol or other substances unless prescribed.  Patient has been advised that PRN or as needed pain medication allows the patient to skip doses if not needed, but that the medication is not to be taken more often or at higher doses than prescribed.    Patient has completed prescribing agreement detailing terms of continued prescribing of controlled substances, including monitoring VICKI reports, urine drug screening, and pill counts if necessary. The patient is aware that inappropriate use will result in cessation of prescribing such medications, and possible termination from this practice.    History and physical exam exhibit continued safe and appropriate use of controlled substances.    1.  Controlled substance abuse agreement discussed and copy in record: YES  2.  Discussed:   Risk of addiction: YES   Specific risk of medications:YES   Side effects of medications: YES   Reasonable expectations of the medication: YES  3.  Treatment Objectives:   Discussed management of constipation: YES   Discussed management of other side effects: YES  4.  eKASPER:     VICKI report has been  reviewed by: Arcadio Mendes MD on 09/26/23 in the PDMD in the electronic medical record.  [unfilled]   Results/Date of last VICKI:  appropriate  5.  Results/Date of last drug screen:  appropriate  6.  Follow up plan:    Follow Up in One Months Time              Continue on current medications as directed    EMR Dictation/Transcription disclaimer:   Some of this note may be an electronic transcription/translation of spoken language to printed text. The electronic translation of spoken language may permit erroneous, or at times, nonsensical words or phrases to be inadvertently transcribed; Although I have reviewed the note for such errors, some may still exist.          Follow Up   Return in about 4 weeks (around 10/24/2023).  Patient was given instructions and counseling regarding his condition or for health maintenance advice. Please see specific information pulled into the AVS if appropriate.

## 2023-10-10 ENCOUNTER — TELEPHONE (OUTPATIENT)
Dept: INTERNAL MEDICINE | Facility: CLINIC | Age: 58
End: 2023-10-10
Payer: COMMERCIAL

## 2023-10-10 DIAGNOSIS — G25.81 RESTLESS LEG SYNDROME: Primary | ICD-10-CM

## 2023-10-10 RX ORDER — LAMOTRIGINE 25 MG/1
50 TABLET ORAL 2 TIMES DAILY
Qty: 120 TABLET | Refills: 0 | Status: SHIPPED | OUTPATIENT
Start: 2023-10-10

## 2023-10-10 NOTE — TELEPHONE ENCOUNTER
Caller: Joan Ramos    Relationship: Self    Best call back number: [phone]     What is the best time to reach you: ANYTIME    Who are you requesting to speak with (clinical staff, provider,  specific staff member): CLINICAL    Do you know the name of the person who called: JOAN    What was the call regarding: JOAN REPORTS THAT THE CARBAMAZEPINE (TEGRETOL-XR) ISN'T WORKING  AND HE ASKING DR GRISSOM TO SEE ABOUT PRESCRIBING HIM ANOTHER MEDICATION THAT WILL WORK.    Is it okay if the provider responds through MyChart: NO

## 2024-07-11 ENCOUNTER — OFFICE VISIT (OUTPATIENT)
Dept: INTERNAL MEDICINE | Facility: CLINIC | Age: 59
End: 2024-07-11
Payer: COMMERCIAL

## 2024-07-11 VITALS
HEIGHT: 71 IN | SYSTOLIC BLOOD PRESSURE: 129 MMHG | TEMPERATURE: 96 F | DIASTOLIC BLOOD PRESSURE: 71 MMHG | OXYGEN SATURATION: 100 % | WEIGHT: 279 LBS | BODY MASS INDEX: 39.06 KG/M2 | HEART RATE: 105 BPM

## 2024-07-11 DIAGNOSIS — G89.4 CHRONIC PAIN SYNDROME: ICD-10-CM

## 2024-07-11 DIAGNOSIS — R73.9 ELEVATED BLOOD SUGAR: ICD-10-CM

## 2024-07-11 DIAGNOSIS — Z00.00 ANNUAL PHYSICAL EXAM: ICD-10-CM

## 2024-07-11 DIAGNOSIS — T14.8XXA ABRASION: ICD-10-CM

## 2024-07-11 DIAGNOSIS — Z12.11 COLON CANCER SCREENING: ICD-10-CM

## 2024-07-11 DIAGNOSIS — R09.89 DECREASED PULSE: Primary | ICD-10-CM

## 2024-07-11 DIAGNOSIS — G47.00 INSOMNIA, UNSPECIFIED TYPE: ICD-10-CM

## 2024-07-11 PROCEDURE — 1125F AMNT PAIN NOTED PAIN PRSNT: CPT | Performed by: FAMILY MEDICINE

## 2024-07-11 PROCEDURE — 99396 PREV VISIT EST AGE 40-64: CPT | Performed by: FAMILY MEDICINE

## 2024-07-11 PROCEDURE — 3078F DIAST BP <80 MM HG: CPT | Performed by: FAMILY MEDICINE

## 2024-07-11 PROCEDURE — 3074F SYST BP LT 130 MM HG: CPT | Performed by: FAMILY MEDICINE

## 2024-07-11 RX ORDER — SULFAMETHOXAZOLE AND TRIMETHOPRIM 800; 160 MG/1; MG/1
1 TABLET ORAL 2 TIMES DAILY
Qty: 14 TABLET | Refills: 0 | Status: SHIPPED | OUTPATIENT
Start: 2024-07-11 | End: 2024-07-18

## 2024-07-11 RX ORDER — MUPIROCIN CALCIUM 20 MG/G
1 CREAM TOPICAL 3 TIMES DAILY
Qty: 15 G | Refills: 0 | Status: SHIPPED | OUTPATIENT
Start: 2024-07-11

## 2024-07-11 RX ORDER — QUETIAPINE FUMARATE 200 MG/1
200 TABLET, FILM COATED ORAL
Qty: 90 TABLET | Refills: 3 | Status: SHIPPED | OUTPATIENT
Start: 2024-07-11

## 2024-07-11 RX ORDER — TOPIRAMATE 50 MG/1
50 TABLET, FILM COATED ORAL 2 TIMES DAILY
Qty: 60 TABLET | Refills: 11 | Status: SHIPPED | OUTPATIENT
Start: 2024-07-11

## 2024-07-11 NOTE — PROGRESS NOTES
Subjective     Chief Complaint   Patient presents with    Insect Bite     Started as a block spot on shin, and has gotten worse         Insect Bite      Raimundo Ramos is a 58 y.o. male who presents for a routine visit at this time.  Patient comes in secondary to multiple medical complaints patient's primary concern today is his annual exam.  I discussed at preventative health care and cancer screening with him and its role in reducing types of cancer and other health problems appropriate for the patient's age.  Also discussed vaccines and current status with the patient.  Patient understands the risks and benefits of screening and is currently up-to-date with current recommendations that he chosses.    Patient is going to go ahead and get set up for colon cancer screening at this time.  Patient reports that he would also like to do his routine labs including a CBC comprehensive metabolic panel lipid profile and TSH she would also like to go ahead and get a hemoglobin A1c as he has had some elevated blood sugars in the past.    Patient also has an abrasion on the front of his left lower leg.  Patient states that he scratched on something and the area has gotten bigger and has a significant amount of redness.  Attempted to check pulses and could not palpate a dorsalis pedis or posterior tibial pulse on this patient at this time.  Will go ahead and arrange for an ESTIVEN for further evaluation.    Patient states his insomnia does fairly well with his Seroquel but would like to increase the dosage to 200 mg at this time.  Likewise he would like to discontinue the Lamictal as he states has not been effective for his nerve pain and would like to try something different we will try Topamax to see if it is effective for this patient,    Patient's PMR from outside medical facility reviewed and noted.      Past Medical History:   Past Medical History:   Diagnosis Date    Anxiety     Back pain     Chronic pain     Depression      Elevated cholesterol     Hypertension     Substance abuse      Past Surgical History:  Past Surgical History:   Procedure Laterality Date    WOUND CLOSURE Right 5/28/2019    Procedure: Complex closure of open scalp wound avulsion defect 7x 4.5 cm with rotation/ advancement flap closure 9cm x 5 cm;  Surgeon: Fam Cardoso MD;  Location: [facility];  Service: ENT     Social History:  reports that he has been smoking cigarettes. He has a 19 pack-year smoking history. He has never used smokeless tobacco. He reports that he does not currently use alcohol. He reports that he does not use drugs.    Family History: family history includes Diabetes in his mother; Hyperlipidemia in his mother; Hypertension in his father and mother; Stroke in his maternal grandfather.      Allergies:  No Known Allergies  Medications:  Prior to Admission medications    Medication Sig Start Date End Date Taking? Authorizing Provider   albuterol (PROVENTIL) 2.5 MG/0.5ML nebulizer solution Take 2.5 mg by nebulization Every 6 (Six) Hours As Needed for Wheezing. 3/3/22  Yes Nella Flores DO   albuterol sulfate  (90 Base) MCG/ACT inhaler Inhale 2 puffs Every 4 (Four) Hours As Needed for Wheezing. 9/26/23  Yes Arcadio Mendes MD   buprenorphine-naloxone (SUBOXONE) 8-2 MG per SL tablet Place 1 tablet under the tongue Daily.   Yes Provider, MD Janie   DULoxetine (CYMBALTA) 60 MG capsule Take 1 capsule by mouth Daily. 9/26/23  Yes Arcadio Mendes MD   fluticasone (FLONASE) 50 MCG/ACT nasal spray 2 sprays into the nostril(s) as directed by provider Daily. 2/15/21  Yes Nella Flores DO   ibuprofen (ADVIL,MOTRIN) 800 MG tablet Take 1 tablet by mouth 2 (Two) Times a Day. 9/26/23  Yes Arcadio Mendes MD   lisinopril (PRINIVIL,ZESTRIL) 40 MG tablet Take 1 tablet by mouth Daily. 9/26/23  Yes Arcadio Mendes MD   loratadine-pseudoephedrine ([employer] Allergy Relief-D12) 5-120 MG per 12 hr  tablet Take 1 tablet by mouth 2 (Two) Times a Day. 9/26/23  Yes Arcadio Mendes MD   QUEtiapine (SEROquel) 200 MG tablet Take 1 tablet by mouth every night at bedtime. 7/11/24  Yes Arcadio Mendes MD   tamsulosin (FLOMAX) 0.4 MG capsule 24 hr capsule Take 1 capsule by mouth Daily. 9/26/23  Yes Arcadio Mendes MD   lamoTRIgine (LaMICtal) 25 MG tablet Take 2 tablets by mouth 2 (Two) Times a Day. 10/10/23 7/11/24 Yes Arcadio Mendes MD   QUEtiapine (SEROquel) 100 MG tablet Take 1 tablet by mouth every night at bedtime. 9/26/23 7/11/24 Yes Arcadio Mendes MD   mupirocin (BACTROBAN) 2 % cream Apply 1 Application topically to the appropriate area as directed 3 (Three) Times a Day. 7/11/24   Arcadio Mendes MD   sulfamethoxazole-trimethoprim (Bactrim DS) 800-160 MG per tablet Take 1 tablet by mouth 2 (Two) Times a Day for 7 days. 7/11/24 7/18/24  Arcadio Mendes MD   topiramate (Topamax) 50 MG tablet Take 1 tablet by mouth 2 (Two) Times a Day. 7/11/24   Arcadio Mendes MD       BRI: Over the last two weeks, how often have you been bothered by the following problems?  Feeling nervous, anxious or on edge: Not at all  Not being able to stop or control worrying: Not at all  Worrying too much about different things: Not at all  Trouble Relaxing: Not at all  Being so restless that it is hard to sit still: Not at all  Becoming easily annoyed or irritable: Not at all  Feeling afraid as if something awful might happen: Not at all  BRI 7 Total Score: 0  If you checked any problems, how difficult have these problems made it for you to do your work, take care of things at home, or get along with other people: Not difficult at all      PHQ-9 Depression Screening  Little interest or pleasure in doing things? 0-->not at all   Feeling down, depressed, or hopeless? 0-->not at all   Trouble falling or staying asleep, or sleeping too much?     Feeling tired or  "having little energy?     Poor appetite or overeating?     Feeling bad about yourself - or that you are a failure or have let yourself or your family down?     Trouble concentrating on things, such as reading the newspaper or watching television?     Moving or speaking so slowly that other people could have noticed? Or the opposite - being so fidgety or restless that you have been moving around a lot more than usual?     Thoughts that you would be better off dead, or of hurting yourself in some way?     PHQ-9 Total Score 0   If you checked off any problems, how difficult have these problems made it for you to do your work, take care of things at home, or get along with other people?         PHQ-9 Total Score: 0   0 (Negative screening for depression)  Support given, observe for worsening symptoms    Review of systems   negative unless otherwise specified above in HPI    Objective     Vital Signs: /71 (BP Location: Left arm, Patient Position: Sitting, Cuff Size: Adult)   Pulse 105   Temp 96 °F (35.6 °C) (Infrared)   Ht 180.3 cm (71\")   Wt 127 kg (279 lb)   SpO2 100%   BMI 38.91 kg/m²     Physical Exam  Vitals and nursing note reviewed.   Constitutional:       General: He is not in acute distress.     Appearance: Normal appearance.   HENT:      Head: Normocephalic.   Eyes:      Extraocular Movements: Extraocular movements intact.      Pupils: Pupils are equal, round, and reactive to light.   Cardiovascular:      Rate and Rhythm: Normal rate and regular rhythm.      Heart sounds: Normal heart sounds. No murmur heard.  Pulmonary:      Effort: Pulmonary effort is normal. No respiratory distress.      Breath sounds: Normal breath sounds. No rhonchi or rales.   Abdominal:      General: Abdomen is flat. Bowel sounds are normal.      Palpations: Abdomen is soft.   Neurological:      General: No focal deficit present.      Mental Status: He is alert.                     Results Reviewed:  Glucose   Date Value Ref " Range Status   08/23/2022 73 65 - 99 mg/dL Final   02/18/2020 79 74 - 109 mg/dL Final     BUN   Date Value Ref Range Status   08/23/2022 16 6 - 24 mg/dL Final   02/18/2020 20 6 - 20 mg/dL Final     Creatinine   Date Value Ref Range Status   08/23/2022 0.82 0.76 - 1.27 mg/dL Final   02/18/2020 0.7 0.5 - 1.2 mg/dL Final     Sodium   Date Value Ref Range Status   08/23/2022 139 134 - 144 mmol/L Final   02/18/2020 140 136 - 145 mmol/L Final     Potassium   Date Value Ref Range Status   08/23/2022 5.2 3.5 - 5.2 mmol/L Final   02/18/2020 4.4 3.5 - 5.0 mmol/L Final     Chloride   Date Value Ref Range Status   08/23/2022 98 96 - 106 mmol/L Final   02/18/2020 102 98 - 111 mmol/L Final     CO2   Date Value Ref Range Status   02/18/2020 24 22 - 29 mmol/L Final     Total CO2   Date Value Ref Range Status   08/23/2022 28 20 - 29 mmol/L Final     Calcium   Date Value Ref Range Status   08/23/2022 9.3 8.7 - 10.2 mg/dL Final   02/18/2020 9.4 8.6 - 10.0 mg/dL Final     ALT (SGPT)   Date Value Ref Range Status   08/23/2022 15 0 - 44 IU/L Final   02/18/2020 8 5 - 41 U/L Final     AST (SGOT)   Date Value Ref Range Status   08/23/2022 14 0 - 40 IU/L Final   02/18/2020 12 5 - 40 U/L Final     WBC   Date Value Ref Range Status   08/23/2022 9.1 3.4 - 10.8 x10E3/uL Final   02/18/2020 7.5 4.8 - 10.8 K/uL Final     Hematocrit   Date Value Ref Range Status   08/23/2022 36.0 (L) 37.5 - 51.0 % Final   02/18/2020 39.5 (L) 42.0 - 52.0 % Final     Platelets   Date Value Ref Range Status   08/23/2022 281 150 - 450 x10E3/uL Final   02/18/2020 281 130 - 400 K/uL Final     Triglycerides   Date Value Ref Range Status   08/23/2022 119 0 - 149 mg/dL Final   02/18/2020 98 0 - 149 mg/dL Final     HDL Cholesterol   Date Value Ref Range Status   08/23/2022 49 >39 mg/dL Final   02/18/2020 44 (L) 55 - 121 mg/dL Final     Comment:     VALUES>60 MG/DL ARE ASSOCIATED WITH A DECREASED RISK OF  ATHEROSCLEROTIC CARDIOVASCULAR DISEASE     LDL Cholesterol    Date Value  Ref Range Status   02/18/2020 77 <100 mg/dL Final     Comment:     <100 MG/DL=OPITIMAL    100-129 MG/DL=DESIRABLE    130-159 MG/DL BORDERLINE=INCREASED RISK OF ATHEROSCLEROTIC  CARDIOVASCULAR DISEASE    > OR = 160 MG/DL=ASSOCIATED WITH AN INCREASE RISK OF  ATHEROSCLEROTIC CARDIOVASCULAR DISEASE     LDL Chol Calc ([facility])   Date Value Ref Range Status   08/23/2022 95 0 - 99 mg/dL Final                 Assessment / Plan     Assessment/Plan:   Diagnosis Plan   1. Decreased pulse  US Ankle / Brachial Indices Extremity Complete      2. Abrasion  US Ankle / Brachial Indices Extremity Complete    sulfamethoxazole-trimethoprim (Bactrim DS) 800-160 MG per tablet    mupirocin (BACTROBAN) 2 % cream      3. Annual physical exam  Lipid Panel    Comprehensive Metabolic Panel    CBC & Differential    TSH      4. Elevated blood sugar  Hemoglobin A1c      5. Insomnia, unspecified type  QUEtiapine (SEROquel) 200 MG tablet      6. Colon cancer screening  Cologuard - Stool, Per Rectum      7. Chronic pain syndrome  topiramate (Topamax) 50 MG tablet            No follow-ups on file. unless patient needs to be seen sooner or acute issues arise.      I have discussed the patient results/orders and and plan/recommendation with them at today's visit.      Signed by:    Arcadio Mendes MD Date: 07/11/24

## 2024-07-12 ENCOUNTER — TELEPHONE (OUTPATIENT)
Dept: INTERNAL MEDICINE | Facility: CLINIC | Age: 59
End: 2024-07-12
Payer: COMMERCIAL

## 2024-07-12 DIAGNOSIS — F33.42 RECURRENT MAJOR DEPRESSIVE DISORDER, IN FULL REMISSION: ICD-10-CM

## 2024-07-12 RX ORDER — DULOXETIN HYDROCHLORIDE 60 MG/1
60 CAPSULE, DELAYED RELEASE ORAL DAILY
Qty: 90 CAPSULE | Refills: 3 | Status: SHIPPED | OUTPATIENT
Start: 2024-07-12

## 2024-07-12 NOTE — TELEPHONE ENCOUNTER
PATIENT SAID THE INS WOULD NOT PAY FOR THE:  mupirocin (BACTROBAN) 2 % cream   WOULD THERE BE A GENERIC FOR THIS CREAM  PLEASE ADVISE

## 2024-07-12 NOTE — TELEPHONE ENCOUNTER
I called and spoke with the pharmacy. He will not be able to refill until 07/22/2024. He has already been given 5 extra on 07/06/2024 with a discount card. He was also given 20 extra with a discount card in April before his fill on 04/22/2024.

## 2024-07-12 NOTE — TELEPHONE ENCOUNTER
Caller: Raimundo Ramos    Relationship: Self    Best call back number: [phone]     Requested Prescriptions:   Requested Prescriptions     Pending Prescriptions Disp Refills    DULoxetine (CYMBALTA) 60 MG capsule 90 capsule 3     Sig: Take 1 capsule by mouth Daily.        Pharmacy where request should be sent: [facility]/PHARMACY #[id number] - [place], [place] - [address] [facility] [phone] [facility] [phone]      Last office visit with prescribing clinician: 7/11/2024   Last telemedicine visit with prescribing clinician: Visit date not found   Next office visit with prescribing clinician: 7/14/2025     Additional details provided by patient:     PATIENT STATES HE HAS MISPLACED THIS MEDICATION. PATIENT STATES HIS INSURANCE COMPANY WILL NOT COVER THIS MEDICATION DUE TO EARLY REFILL.    PATIENT STATES HE CANNOT GO WITHOUT THIS MEDICATION. PATIENT IS REQUESTING DR. GRISSOM CONTACT THE PHARMACY OR THE INSURANCE COMPANY REGARDING THIS ISSUE.     Does the patient have less than a 3 day supply:  [x] Yes  [] No    Would you like a call back once the refill request has been completed: [x] Yes [] No    If the office needs to give you a call back, can they leave a voicemail: [] Yes [] No    Rivera Arevalo   07/12/24 14:08 CDT

## 2024-07-16 ENCOUNTER — OFFICE VISIT (OUTPATIENT)
Dept: INTERNAL MEDICINE | Facility: CLINIC | Age: 59
End: 2024-07-16
Payer: COMMERCIAL

## 2024-07-16 ENCOUNTER — LAB (OUTPATIENT)
Dept: INTERNAL MEDICINE | Facility: CLINIC | Age: 59
End: 2024-07-16
Payer: COMMERCIAL

## 2024-07-16 VITALS
SYSTOLIC BLOOD PRESSURE: 115 MMHG | TEMPERATURE: 97.3 F | DIASTOLIC BLOOD PRESSURE: 73 MMHG | OXYGEN SATURATION: 98 % | HEART RATE: 97 BPM | WEIGHT: 279 LBS | BODY MASS INDEX: 39.06 KG/M2 | HEIGHT: 71 IN

## 2024-07-16 DIAGNOSIS — M54.42 CHRONIC LEFT-SIDED LOW BACK PAIN WITH LEFT-SIDED SCIATICA: Primary | ICD-10-CM

## 2024-07-16 DIAGNOSIS — G89.29 CHRONIC LEFT-SIDED LOW BACK PAIN WITH LEFT-SIDED SCIATICA: Primary | ICD-10-CM

## 2024-07-16 DIAGNOSIS — Z00.00 ANNUAL PHYSICAL EXAM: ICD-10-CM

## 2024-07-16 DIAGNOSIS — G47.00 INSOMNIA, UNSPECIFIED TYPE: ICD-10-CM

## 2024-07-16 DIAGNOSIS — R73.9 ELEVATED BLOOD SUGAR: ICD-10-CM

## 2024-07-16 PROCEDURE — 1125F AMNT PAIN NOTED PAIN PRSNT: CPT | Performed by: FAMILY MEDICINE

## 2024-07-16 PROCEDURE — 3074F SYST BP LT 130 MM HG: CPT | Performed by: FAMILY MEDICINE

## 2024-07-16 PROCEDURE — 99213 OFFICE O/P EST LOW 20 MIN: CPT | Performed by: FAMILY MEDICINE

## 2024-07-16 PROCEDURE — 3078F DIAST BP <80 MM HG: CPT | Performed by: FAMILY MEDICINE

## 2024-07-16 RX ORDER — NORTRIPTYLINE HYDROCHLORIDE 10 MG/1
10 CAPSULE ORAL NIGHTLY
Qty: 30 CAPSULE | Refills: 11 | Status: SHIPPED | OUTPATIENT
Start: 2024-07-16

## 2024-07-16 RX ORDER — QUETIAPINE FUMARATE 100 MG/1
100 TABLET, FILM COATED ORAL 2 TIMES DAILY
Qty: 180 TABLET | Refills: 3 | Status: SHIPPED | OUTPATIENT
Start: 2024-07-16

## 2024-07-16 NOTE — PROGRESS NOTES
Subjective     Chief Complaint   Patient presents with    Spot on leg       History of Present Illness  Raimundo Ramos is a 58 y.o. male who presents for a routine visit at this time.  Patient is still awaiting his ESTIVEN at this time.    Patient also has an abrasion on the front of his left lower leg.  This area is improving with antibiotics patient advised if it is not doing a lot better by the day he finishes his antibiotics call the office and we will call in some additional.    Patient states his insomnia does fairly well with his Seroquel but would like to increase the dosage to 100 mg twice a day patient also would like to try something different for his nerve pains we will try some nortriptyline.  Patient's PMR from outside medical facility reviewed and noted.      Past Medical History:   Past Medical History:   Diagnosis Date    Anxiety     Back pain     Chronic pain     Depression     Elevated cholesterol     Hypertension     Substance abuse      Past Surgical History:  Past Surgical History:   Procedure Laterality Date    WOUND CLOSURE Right 5/28/2019    Procedure: Complex closure of open scalp wound avulsion defect 7x 4.5 cm with rotation/ advancement flap closure 9cm x 5 cm;  Surgeon: Fam Cardoso MD;  Location: [facility];  Service: ENT     Social History:  reports that he has been smoking cigarettes. He has a 19 pack-year smoking history. He has never used smokeless tobacco. He reports that he does not currently use alcohol. He reports that he does not use drugs.    Family History: family history includes Diabetes in his mother; Hyperlipidemia in his mother; Hypertension in his father and mother; Stroke in his maternal grandfather.      Allergies:  No Known Allergies  Medications:  Prior to Admission medications    Medication Sig Start Date End Date Taking? Authorizing Provider   albuterol (PROVENTIL) 2.5 MG/0.5ML nebulizer solution Take 2.5 mg by nebulization Every 6 (Six) Hours As Needed  for Wheezing. 3/3/22  Yes Nella Flores DO   albuterol sulfate  (90 Base) MCG/ACT inhaler Inhale 2 puffs Every 4 (Four) Hours As Needed for Wheezing. 9/26/23  Yes Arcadio Mendes MD   buprenorphine-naloxone (SUBOXONE) 8-2 MG per SL tablet Place 1 tablet under the tongue Daily.   Yes Janie Bueno MD   DULoxetine (CYMBALTA) 60 MG capsule Take 1 capsule by mouth Daily. 9/26/23  Yes Arcadio Mendes MD   fluticasone (FLONASE) 50 MCG/ACT nasal spray 2 sprays into the nostril(s) as directed by provider Daily. 2/15/21  Yes Nella Flores DO   ibuprofen (ADVIL,MOTRIN) 800 MG tablet Take 1 tablet by mouth 2 (Two) Times a Day. 9/26/23  Yes Arcadio Mendes MD   lisinopril (PRINIVIL,ZESTRIL) 40 MG tablet Take 1 tablet by mouth Daily. 9/26/23  Yes Arcadio Mendes MD   loratadine-pseudoephedrine ([employer] Allergy Relief-D12) 5-120 MG per 12 hr tablet Take 1 tablet by mouth 2 (Two) Times a Day. 9/26/23  Yes Arcadio Mendes MD   QUEtiapine (SEROquel) 200 MG tablet Take 1 tablet by mouth every night at bedtime. 7/11/24  Yes Arcadio Mendes MD   tamsulosin (FLOMAX) 0.4 MG capsule 24 hr capsule Take 1 capsule by mouth Daily. 9/26/23  Yes Arcadio Mendes MD   lamoTRIgine (LaMICtal) 25 MG tablet Take 2 tablets by mouth 2 (Two) Times a Day. 10/10/23 7/11/24 Yes Arcadio Mendes MD   QUEtiapine (SEROquel) 100 MG tablet Take 1 tablet by mouth every night at bedtime. 9/26/23 7/11/24 Yes Arcadio Mendes MD   mupirocin (BACTROBAN) 2 % cream Apply 1 Application topically to the appropriate area as directed 3 (Three) Times a Day. 7/11/24   Arcadio Mendes MD   sulfamethoxazole-trimethoprim (Bactrim DS) 800-160 MG per tablet Take 1 tablet by mouth 2 (Two) Times a Day for 7 days. 7/11/24 7/18/24  Arcadio Mendes MD   topiramate (Topamax) 50 MG tablet Take 1 tablet by mouth 2 (Two) Times a Day. 7/11/24   Vaughn  "Arcadio Mejía MD           Review of systems   negative unless otherwise specified above in HPI    Objective     Vital Signs: /73 (BP Location: Left arm, Patient Position: Sitting, Cuff Size: Adult)   Pulse 97   Temp 97.3 °F (36.3 °C) (Infrared)   Ht 180.3 cm (71\")   Wt 127 kg (279 lb)   SpO2 98%   BMI 38.91 kg/m²     Physical Exam  Vitals and nursing note reviewed.   Constitutional:       General: He is not in acute distress.     Appearance: Normal appearance.   HENT:      Head: Normocephalic.   Eyes:      Extraocular Movements: Extraocular movements intact.      Pupils: Pupils are equal, round, and reactive to light.   Cardiovascular:      Rate and Rhythm: Normal rate and regular rhythm.      Heart sounds: Normal heart sounds. No murmur heard.  Pulmonary:      Effort: Pulmonary effort is normal. No respiratory distress.      Breath sounds: Normal breath sounds. No rhonchi or rales.   Abdominal:      General: Abdomen is flat. Bowel sounds are normal.      Palpations: Abdomen is soft.   Neurological:      General: No focal deficit present.      Mental Status: He is alert.                     Results Reviewed:  Glucose   Date Value Ref Range Status   08/23/2022 73 65 - 99 mg/dL Final   02/18/2020 79 74 - 109 mg/dL Final     BUN   Date Value Ref Range Status   08/23/2022 16 6 - 24 mg/dL Final   02/18/2020 20 6 - 20 mg/dL Final     Creatinine   Date Value Ref Range Status   08/23/2022 0.82 0.76 - 1.27 mg/dL Final   02/18/2020 0.7 0.5 - 1.2 mg/dL Final     Sodium   Date Value Ref Range Status   08/23/2022 139 134 - 144 mmol/L Final   02/18/2020 140 136 - 145 mmol/L Final     Potassium   Date Value Ref Range Status   08/23/2022 5.2 3.5 - 5.2 mmol/L Final   02/18/2020 4.4 3.5 - 5.0 mmol/L Final     Chloride   Date Value Ref Range Status   08/23/2022 98 96 - 106 mmol/L Final   02/18/2020 102 98 - 111 mmol/L Final     CO2   Date Value Ref Range Status   02/18/2020 24 22 - 29 mmol/L Final     Total CO2   Date " Value Ref Range Status   08/23/2022 28 20 - 29 mmol/L Final     Calcium   Date Value Ref Range Status   08/23/2022 9.3 8.7 - 10.2 mg/dL Final   02/18/2020 9.4 8.6 - 10.0 mg/dL Final     ALT (SGPT)   Date Value Ref Range Status   08/23/2022 15 0 - 44 IU/L Final   02/18/2020 8 5 - 41 U/L Final     AST (SGOT)   Date Value Ref Range Status   08/23/2022 14 0 - 40 IU/L Final   02/18/2020 12 5 - 40 U/L Final     WBC   Date Value Ref Range Status   08/23/2022 9.1 3.4 - 10.8 x10E3/uL Final   02/18/2020 7.5 4.8 - 10.8 K/uL Final     Hematocrit   Date Value Ref Range Status   08/23/2022 36.0 (L) 37.5 - 51.0 % Final   02/18/2020 39.5 (L) 42.0 - 52.0 % Final     Platelets   Date Value Ref Range Status   08/23/2022 281 150 - 450 x10E3/uL Final   02/18/2020 281 130 - 400 K/uL Final     Triglycerides   Date Value Ref Range Status   08/23/2022 119 0 - 149 mg/dL Final   02/18/2020 98 0 - 149 mg/dL Final     HDL Cholesterol   Date Value Ref Range Status   08/23/2022 49 >39 mg/dL Final   02/18/2020 44 (L) 55 - 121 mg/dL Final     Comment:     VALUES>60 MG/DL ARE ASSOCIATED WITH A DECREASED RISK OF  ATHEROSCLEROTIC CARDIOVASCULAR DISEASE     LDL Cholesterol    Date Value Ref Range Status   02/18/2020 77 <100 mg/dL Final     Comment:     <100 MG/DL=OPITIMAL    100-129 MG/DL=DESIRABLE    130-159 MG/DL BORDERLINE=INCREASED RISK OF ATHEROSCLEROTIC  CARDIOVASCULAR DISEASE    > OR = 160 MG/DL=ASSOCIATED WITH AN INCREASE RISK OF  ATHEROSCLEROTIC CARDIOVASCULAR DISEASE     LDL Chol Calc (NIH)   Date Value Ref Range Status   08/23/2022 95 0 - 99 mg/dL Final                 Assessment / Plan     Assessment/Plan:   Diagnosis Plan   1. Chronic left-sided low back pain with left-sided sciatica  nortriptyline (PAMELOR) 10 MG capsule      2. Insomnia, unspecified type  QUEtiapine (SEROquel) 100 MG tablet            No follow-ups on file. unless patient needs to be seen sooner or acute issues arise.      I have discussed the patient results/orders and  and plan/recommendation with them at today's visit.      Signed by:    Arcadio Mendes MD Date: 07/16/24

## 2024-07-18 DIAGNOSIS — N17.9 ACUTE RENAL FAILURE, UNSPECIFIED ACUTE RENAL FAILURE TYPE: Primary | ICD-10-CM

## 2024-07-18 LAB
ALBUMIN SERPL-MCNC: 4.1 G/DL (ref 3.8–4.9)
ALP SERPL-CCNC: 103 IU/L (ref 44–121)
ALT SERPL-CCNC: 13 IU/L (ref 0–44)
AST SERPL-CCNC: 14 IU/L (ref 0–40)
BASOPHILS # BLD AUTO: 0 X10E3/UL (ref 0–0.2)
BASOPHILS NFR BLD AUTO: 0 %
BILIRUB SERPL-MCNC: 0.3 MG/DL (ref 0–1.2)
BUN SERPL-MCNC: 40 MG/DL (ref 6–24)
BUN/CREAT SERPL: 23 (ref 9–20)
CALCIUM SERPL-MCNC: 9.6 MG/DL (ref 8.7–10.2)
CHLORIDE SERPL-SCNC: 102 MMOL/L (ref 96–106)
CHOLEST SERPL-MCNC: 156 MG/DL (ref 100–199)
CO2 SERPL-SCNC: 21 MMOL/L (ref 20–29)
CREAT SERPL-MCNC: 1.76 MG/DL (ref 0.76–1.27)
EGFRCR SERPLBLD CKD-EPI 2021: 45 ML/MIN/1.73
EOSINOPHIL # BLD AUTO: 0.2 X10E3/UL (ref 0–0.4)
EOSINOPHIL NFR BLD AUTO: 2 %
ERYTHROCYTE [DISTWIDTH] IN BLOOD BY AUTOMATED COUNT: 13.6 % (ref 11.6–15.4)
GLOBULIN SER CALC-MCNC: 3.6 G/DL (ref 1.5–4.5)
GLUCOSE SERPL-MCNC: 100 MG/DL (ref 70–99)
HBA1C MFR BLD: 6.2 % (ref 4.8–5.6)
HCT VFR BLD AUTO: 42.5 % (ref 37.5–51)
HDLC SERPL-MCNC: 38 MG/DL
HGB BLD-MCNC: 13.1 G/DL (ref 13–17.7)
IMM GRANULOCYTES # BLD AUTO: 0 X10E3/UL (ref 0–0.1)
IMM GRANULOCYTES NFR BLD AUTO: 0 %
LDLC SERPL CALC-MCNC: 78 MG/DL (ref 0–99)
LYMPHOCYTES # BLD AUTO: 2.6 X10E3/UL (ref 0.7–3.1)
LYMPHOCYTES NFR BLD AUTO: 31 %
MCH RBC QN AUTO: 28.5 PG (ref 26.6–33)
MCHC RBC AUTO-ENTMCNC: 30.8 G/DL (ref 31.5–35.7)
MCV RBC AUTO: 93 FL (ref 79–97)
MONOCYTES # BLD AUTO: 0.6 X10E3/UL (ref 0.1–0.9)
MONOCYTES NFR BLD AUTO: 7 %
NEUTROPHILS # BLD AUTO: 4.9 X10E3/UL (ref 1.4–7)
NEUTROPHILS NFR BLD AUTO: 60 %
PLATELET # BLD AUTO: 285 X10E3/UL (ref 150–450)
POTASSIUM SERPL-SCNC: 5.9 MMOL/L (ref 3.5–5.2)
PROT SERPL-MCNC: 7.7 G/DL (ref 6–8.5)
RBC # BLD AUTO: 4.59 X10E6/UL (ref 4.14–5.8)
SODIUM SERPL-SCNC: 136 MMOL/L (ref 134–144)
TRIGL SERPL-MCNC: 240 MG/DL (ref 0–149)
TSH SERPL DL<=0.005 MIU/L-ACNC: 0.66 UIU/ML (ref 0.45–4.5)
VLDLC SERPL CALC-MCNC: 40 MG/DL (ref 5–40)
WBC # BLD AUTO: 8.3 X10E3/UL (ref 3.4–10.8)

## 2024-08-09 DIAGNOSIS — G89.29 CHRONIC LEFT-SIDED LOW BACK PAIN WITH LEFT-SIDED SCIATICA: ICD-10-CM

## 2024-08-09 DIAGNOSIS — M54.42 CHRONIC LEFT-SIDED LOW BACK PAIN WITH LEFT-SIDED SCIATICA: ICD-10-CM

## 2024-08-09 RX ORDER — NORTRIPTYLINE HYDROCHLORIDE 10 MG/1
10 CAPSULE ORAL NIGHTLY
Qty: 90 CAPSULE | Refills: 3 | Status: SHIPPED | OUTPATIENT
Start: 2024-08-09

## 2024-10-10 ENCOUNTER — OFFICE VISIT (OUTPATIENT)
Dept: INTERNAL MEDICINE | Facility: CLINIC | Age: 59
End: 2024-10-10
Payer: COMMERCIAL

## 2024-10-10 VITALS
WEIGHT: 288.4 LBS | HEART RATE: 82 BPM | HEIGHT: 71 IN | SYSTOLIC BLOOD PRESSURE: 160 MMHG | DIASTOLIC BLOOD PRESSURE: 92 MMHG | OXYGEN SATURATION: 98 % | BODY MASS INDEX: 40.38 KG/M2 | TEMPERATURE: 98.7 F | RESPIRATION RATE: 18 BRPM

## 2024-10-10 DIAGNOSIS — I10 ESSENTIAL HYPERTENSION: ICD-10-CM

## 2024-10-10 DIAGNOSIS — G89.29 CHRONIC LEFT-SIDED LOW BACK PAIN WITH LEFT-SIDED SCIATICA: ICD-10-CM

## 2024-10-10 DIAGNOSIS — J40 BRONCHITIS: ICD-10-CM

## 2024-10-10 DIAGNOSIS — M19.90 ARTHRITIS: ICD-10-CM

## 2024-10-10 DIAGNOSIS — M54.42 CHRONIC LEFT-SIDED LOW BACK PAIN WITH LEFT-SIDED SCIATICA: ICD-10-CM

## 2024-10-10 DIAGNOSIS — F33.42 RECURRENT MAJOR DEPRESSIVE DISORDER, IN FULL REMISSION: ICD-10-CM

## 2024-10-10 DIAGNOSIS — R73.03 BORDERLINE DIABETES: Primary | ICD-10-CM

## 2024-10-10 DIAGNOSIS — G47.00 INSOMNIA, UNSPECIFIED TYPE: ICD-10-CM

## 2024-10-10 PROCEDURE — 99214 OFFICE O/P EST MOD 30 MIN: CPT | Performed by: FAMILY MEDICINE

## 2024-10-10 PROCEDURE — 1160F RVW MEDS BY RX/DR IN RCRD: CPT | Performed by: FAMILY MEDICINE

## 2024-10-10 PROCEDURE — 1125F AMNT PAIN NOTED PAIN PRSNT: CPT | Performed by: FAMILY MEDICINE

## 2024-10-10 PROCEDURE — 3077F SYST BP >= 140 MM HG: CPT | Performed by: FAMILY MEDICINE

## 2024-10-10 PROCEDURE — 1159F MED LIST DOCD IN RCRD: CPT | Performed by: FAMILY MEDICINE

## 2024-10-10 PROCEDURE — 3080F DIAST BP >= 90 MM HG: CPT | Performed by: FAMILY MEDICINE

## 2024-10-10 RX ORDER — ALBUTEROL SULFATE 90 UG/1
2 INHALANT RESPIRATORY (INHALATION) EVERY 4 HOURS PRN
Qty: 18 G | Refills: 11 | Status: SHIPPED | OUTPATIENT
Start: 2024-10-10

## 2024-10-10 RX ORDER — LISINOPRIL 40 MG/1
40 TABLET ORAL DAILY
Qty: 90 TABLET | Refills: 3 | Status: SHIPPED | OUTPATIENT
Start: 2024-10-10

## 2024-10-10 RX ORDER — QUETIAPINE FUMARATE 100 MG/1
100 TABLET, FILM COATED ORAL 2 TIMES DAILY
Qty: 180 TABLET | Refills: 3 | Status: SHIPPED | OUTPATIENT
Start: 2024-10-10

## 2024-10-10 RX ORDER — TOPIRAMATE 25 MG/1
25 TABLET, FILM COATED ORAL 2 TIMES DAILY
Qty: 60 TABLET | Refills: 11 | Status: SHIPPED | OUTPATIENT
Start: 2024-10-10 | End: 2024-10-14

## 2024-10-10 RX ORDER — IBUPROFEN 800 MG/1
800 TABLET, FILM COATED ORAL 2 TIMES DAILY
Qty: 180 TABLET | Refills: 3 | Status: SHIPPED | OUTPATIENT
Start: 2024-10-10

## 2024-10-10 RX ORDER — DULOXETIN HYDROCHLORIDE 60 MG/1
60 CAPSULE, DELAYED RELEASE ORAL DAILY
Qty: 90 CAPSULE | Refills: 3 | Status: SHIPPED | OUTPATIENT
Start: 2024-10-10

## 2024-10-10 NOTE — PROGRESS NOTES
Subjective     Chief Complaint   Patient presents with    Med Refill       History of Present Illness    Patient's PMR from outside medical facility reviewed and noted.    Raimundo Ramos is a 59 y.o. male who presents for a routine office visit.  Patient comes in for recheck of borderline diabetes last hemoglobin A1c was moderately elevated we will go ahead and recheck that now as well as a CBC and a comprehensive metabolic Pap panel.    Needs refills of several of his medications.  Patient states that his arthritis depression hypertension and insomnia all remain well-controlled on his current dose of medications.    Has his Cologuard and is going to attempt to get it done before his next visit.    He states his Cymbalta helps with his chronic lower back pain but would like something additional for nerve pain we will add some Topamax to see if this will be effective.    Past Medical History:   Past Medical History:   Diagnosis Date    Anxiety     Back pain     Chronic pain     Depression     Elevated cholesterol     Hypertension     Substance abuse      Past Surgical History:  Past Surgical History:   Procedure Laterality Date    WOUND CLOSURE Right 5/28/2019    Procedure: Complex closure of open scalp wound avulsion defect 7x 4.5 cm with rotation/ advancement flap closure 9cm x 5 cm;  Surgeon: Fam Cardoso MD;  Location: [facility];  Service: ENT     Social History:  reports that he has been smoking cigarettes. He has a 19 pack-year smoking history. He has never used smokeless tobacco. He reports that he does not currently use alcohol. He reports that he does not use drugs.    Family History: family history includes Diabetes in his mother; Hyperlipidemia in his mother; Hypertension in his father and mother; Stroke in his maternal grandfather.      Allergies:  Allergies   Allergen Reactions    Meloxicam Other (See Comments)    Nsaids Other (See Comments)    Oxazepam Other (See Comments)      Medications:  Prior to Admission medications    Medication Sig Start Date End Date Taking? Authorizing Provider   albuterol sulfate  (90 Base) MCG/ACT inhaler Inhale 2 puffs Every 4 (Four) Hours As Needed for Wheezing. 9/26/23  Yes Arcadio Mendes MD   buprenorphine-naloxone (SUBOXONE) 8-2 MG per SL tablet Place 1 tablet under the tongue Daily.   Yes ProviderJanie MD   DULoxetine (CYMBALTA) 60 MG capsule Take 1 capsule by mouth Daily. 7/12/24  Yes Arcadio Mendes MD   ibuprofen (ADVIL,MOTRIN) 800 MG tablet Take 1 tablet by mouth 2 (Two) Times a Day. 9/26/23  Yes Arcadio Mendes MD   lisinopril (PRINIVIL,ZESTRIL) 40 MG tablet Take 1 tablet by mouth Daily. 9/26/23  Yes Arcadio Mendes MD   QUEtiapine (SEROquel) 100 MG tablet Take 1 tablet by mouth 2 (Two) Times a Day. 7/16/24  Yes Arcadio Mendes MD   albuterol (PROVENTIL) 2.5 MG/0.5ML nebulizer solution Take 2.5 mg by nebulization Every 6 (Six) Hours As Needed for Wheezing.  Patient not taking: Reported on 10/10/2024 3/3/22   Nella Flores DO   fluticasone (FLONASE) 50 MCG/ACT nasal spray 2 sprays into the nostril(s) as directed by provider Daily.  Patient not taking: Reported on 10/10/2024 2/15/21   Nella Flores DO   loratadine-pseudoephedrine ([employer] Allergy Relief-D12) 5-120 MG per 12 hr tablet Take 1 tablet by mouth 2 (Two) Times a Day.  Patient not taking: Reported on 10/10/2024 9/26/23   Arcadio Mendes MD   mupirocin (BACTROBAN) 2 % cream Apply 1 Application topically to the appropriate area as directed 3 (Three) Times a Day.  Patient not taking: Reported on 10/10/2024 7/11/24   Arcadio Mendes MD   mupirocin (BACTROBAN) 2 % ointment Apply 1 Application topically to the appropriate area as directed 3 (Three) Times a Day.  Patient not taking: Reported on 10/10/2024 7/12/24   Arcadio Mendes MD   nortriptyline (PAMELOR) 10 MG capsule Take 1 capsule by  "mouth Every Night.  Patient not taking: Reported on 10/10/2024 8/9/24   Arcadio Mendes MD   tamsulosin (FLOMAX) 0.4 MG capsule 24 hr capsule Take 1 capsule by mouth Daily.  Patient not taking: Reported on 10/10/2024 9/26/23   Arcadio Mendes MD       BRI:        PHQ-9 Depression Screening  Little interest or pleasure in doing things? 0-->not at all   Feeling down, depressed, or hopeless? 0-->not at all   Trouble falling or staying asleep, or sleeping too much?     Feeling tired or having little energy?     Poor appetite or overeating?     Feeling bad about yourself - or that you are a failure or have let yourself or your family down?     Trouble concentrating on things, such as reading the newspaper or watching television?     Moving or speaking so slowly that other people could have noticed? Or the opposite - being so fidgety or restless that you have been moving around a lot more than usual?     Thoughts that you would be better off dead, or of hurting yourself in some way?     PHQ-9 Total Score 0   If you checked off any problems, how difficult have these problems made it for you to do your work, take care of things at home, or get along with other people?         PHQ-9 Total Score: 0   0 (Negative screening for depression)  Support given, observe for worsening symptoms    Review of systems   negative unless otherwise specified above in HPI    Objective     Vital Signs: /92 (BP Location: Right arm, Patient Position: Sitting, Cuff Size: Adult)   Pulse 82   Temp 98.7 °F (37.1 °C) (Infrared)   Resp 18   Ht 180.3 cm (71\")   Wt 131 kg (288 lb 6.4 oz)   SpO2 98%   BMI 40.22 kg/m²     Physical Exam  Vitals and nursing note reviewed.   Constitutional:       General: He is not in acute distress.     Appearance: Normal appearance.   HENT:      Head: Normocephalic.   Eyes:      Extraocular Movements: Extraocular movements intact.      Pupils: Pupils are equal, round, and reactive to light. "   Cardiovascular:      Rate and Rhythm: Normal rate and regular rhythm.      Heart sounds: Normal heart sounds. No murmur heard.  Pulmonary:      Effort: Pulmonary effort is normal. No respiratory distress.      Breath sounds: Normal breath sounds. No rhonchi or rales.   Abdominal:      General: Abdomen is flat. Bowel sounds are normal.      Palpations: Abdomen is soft.   Neurological:      General: No focal deficit present.      Mental Status: He is alert.                Results Reviewed:  Glucose   Date Value Ref Range Status   07/16/2024 100 (H) 70 - 99 mg/dL Final   02/18/2020 79 74 - 109 mg/dL Final     BUN   Date Value Ref Range Status   07/16/2024 40 (H) 6 - 24 mg/dL Final   02/18/2020 20 6 - 20 mg/dL Final     Creatinine   Date Value Ref Range Status   07/16/2024 1.76 (H) 0.76 - 1.27 mg/dL Final   02/18/2020 0.7 0.5 - 1.2 mg/dL Final     Sodium   Date Value Ref Range Status   07/16/2024 136 134 - 144 mmol/L Final   02/18/2020 140 136 - 145 mmol/L Final     Potassium   Date Value Ref Range Status   07/16/2024 5.9 (H) 3.5 - 5.2 mmol/L Final   02/18/2020 4.4 3.5 - 5.0 mmol/L Final     Chloride   Date Value Ref Range Status   07/16/2024 102 96 - 106 mmol/L Final   02/18/2020 102 98 - 111 mmol/L Final     CO2   Date Value Ref Range Status   02/18/2020 24 22 - 29 mmol/L Final     Total CO2   Date Value Ref Range Status   07/16/2024 21 20 - 29 mmol/L Final     Calcium   Date Value Ref Range Status   07/16/2024 9.6 8.7 - 10.2 mg/dL Final   02/18/2020 9.4 8.6 - 10.0 mg/dL Final     ALT (SGPT)   Date Value Ref Range Status   07/16/2024 13 0 - 44 IU/L Final   02/18/2020 8 5 - 41 U/L Final     AST (SGOT)   Date Value Ref Range Status   07/16/2024 14 0 - 40 IU/L Final   02/18/2020 12 5 - 40 U/L Final     WBC   Date Value Ref Range Status   07/16/2024 8.3 3.4 - 10.8 x10E3/uL Final   02/18/2020 7.5 4.8 - 10.8 K/uL Final     Hematocrit   Date Value Ref Range Status   07/16/2024 42.5 37.5 - 51.0 % Final   02/18/2020 39.5 (L)  42.0 - 52.0 % Final     Platelets   Date Value Ref Range Status   07/16/2024 285 150 - 450 x10E3/uL Final   02/18/2020 281 130 - 400 K/uL Final     Triglycerides   Date Value Ref Range Status   07/16/2024 240 (H) 0 - 149 mg/dL Final   02/18/2020 98 0 - 149 mg/dL Final     HDL Cholesterol   Date Value Ref Range Status   07/16/2024 38 (L) >39 mg/dL Final   02/18/2020 44 (L) 55 - 121 mg/dL Final     Comment:     VALUES>60 MG/DL ARE ASSOCIATED WITH A DECREASED RISK OF  ATHEROSCLEROTIC CARDIOVASCULAR DISEASE     LDL Cholesterol    Date Value Ref Range Status   02/18/2020 77 <100 mg/dL Final     Comment:     <100 MG/DL=OPITIMAL    100-129 MG/DL=DESIRABLE    130-159 MG/DL BORDERLINE=INCREASED RISK OF ATHEROSCLEROTIC  CARDIOVASCULAR DISEASE    > OR = 160 MG/DL=ASSOCIATED WITH AN INCREASE RISK OF  ATHEROSCLEROTIC CARDIOVASCULAR DISEASE     LDL Chol Calc (NIH)   Date Value Ref Range Status   07/16/2024 78 0 - 99 mg/dL Final     Hemoglobin A1C   Date Value Ref Range Status   07/16/2024 6.2 (H) 4.8 - 5.6 % Final     Comment:              Prediabetes: 5.7 - 6.4           Diabetes: >6.4           Glycemic control for adults with diabetes: <7.0               Procedure   Procedures       Assessment / Plan     Assessment/Plan:   Diagnosis Plan   1. Borderline diabetes  Microalbumin / Creatinine Urine Ratio - Urine, Clean Catch    Comprehensive Metabolic Panel    Hemoglobin A1c      2. Bronchitis  albuterol sulfate  (90 Base) MCG/ACT inhaler      3. Arthritis  ibuprofen (ADVIL,MOTRIN) 800 MG tablet      4. Recurrent major depressive disorder, in full remission  DULoxetine (CYMBALTA) 60 MG capsule      5. Essential hypertension  lisinopril (PRINIVIL,ZESTRIL) 40 MG tablet      6. Insomnia, unspecified type  QUEtiapine (SEROquel) 100 MG tablet      7. Chronic left-sided low back pain with left-sided sciatica  topiramate (Topamax) 25 MG tablet            Return in about 6 months (around 4/10/2025) for Recheck. unless patient needs  to be seen sooner or acute issues arise.      I have discussed the patient results/orders and and plan/recommendation with them at today's visit.      Signed by:    Arcadio Mendes MD Date: 10/10/24

## 2024-10-12 LAB
ALBUMIN SERPL-MCNC: 3.8 G/DL (ref 3.8–4.9)
ALBUMIN/CREAT UR: 14 MG/G CREAT (ref 0–29)
ALP SERPL-CCNC: 99 IU/L (ref 44–121)
ALT SERPL-CCNC: 10 IU/L (ref 0–44)
AST SERPL-CCNC: 16 IU/L (ref 0–40)
BILIRUB SERPL-MCNC: <0.2 MG/DL (ref 0–1.2)
BUN SERPL-MCNC: 21 MG/DL (ref 6–24)
BUN/CREAT SERPL: 21 (ref 9–20)
CALCIUM SERPL-MCNC: 9 MG/DL (ref 8.7–10.2)
CHLORIDE SERPL-SCNC: 99 MMOL/L (ref 96–106)
CO2 SERPL-SCNC: 26 MMOL/L (ref 20–29)
CREAT SERPL-MCNC: 0.99 MG/DL (ref 0.76–1.27)
CREAT UR-MCNC: 172.4 MG/DL
EGFRCR SERPLBLD CKD-EPI 2021: 88 ML/MIN/1.73
GLOBULIN SER CALC-MCNC: 3.5 G/DL (ref 1.5–4.5)
GLUCOSE SERPL-MCNC: 97 MG/DL (ref 70–99)
HBA1C MFR BLD: 5.8 % (ref 4.8–5.6)
MICROALBUMIN UR-MCNC: 23.3 UG/ML
POTASSIUM SERPL-SCNC: 4.4 MMOL/L (ref 3.5–5.2)
PROT SERPL-MCNC: 7.3 G/DL (ref 6–8.5)
SODIUM SERPL-SCNC: 137 MMOL/L (ref 134–144)

## 2024-10-14 RX ORDER — CETIRIZINE HYDROCHLORIDE 10 MG/1
10 TABLET ORAL DAILY
Qty: 30 TABLET | Refills: 11 | Status: SHIPPED | OUTPATIENT
Start: 2024-10-14

## 2024-12-05 ENCOUNTER — PRIOR AUTHORIZATION (OUTPATIENT)
Dept: INTERNAL MEDICINE | Facility: CLINIC | Age: 59
End: 2024-12-05
Payer: COMMERCIAL

## 2024-12-05 NOTE — TELEPHONE ENCOUNTER
Raimundo Ramos (Key: [id number])  PA Case ID #: [id number]  Rx #: [id number]  Need Help? Call us at [phone]  Status  sent iconSent to Plan today  Drug  QUEtiapine Fumarate 100MG tablets  ePA cloud logo  Form  [employer] [place] Medicaid ePA Form 2017 NCPDP  Original Claim Info  75 TRANS FEE = 0.00PA REQUIRED; SUBMIT THRU [email or web address]; RESUBMIT WITH QUALIFIED ICD-10 CODEROUTINE METABOLIC LAB RECOMMENDED FOR PEDIATRIC MEMBERS

## 2025-01-07 ENCOUNTER — TELEPHONE (OUTPATIENT)
Dept: INTERNAL MEDICINE | Facility: CLINIC | Age: 60
End: 2025-01-07

## 2025-01-07 NOTE — TELEPHONE ENCOUNTER
Caller: Raimundo Ramos    Relationship: Self    Best call back number: [phone]     What is the best time to reach you: AS SOON AS POSSIBLE    Who are you requesting to speak with (clinical staff, provider,  specific staff member): CLINICAL    What was the call regarding: DULoxetine (CYMBALTA) 60 MG capsule PATIENT IS ALMOST OUT AND HE WENT TO [facility] TO GET THIS AND THE WHOLE PHARMACY IS LOCKED UP. SOMEONE AT THE STORE TOLD HIM THAT THEY DO NOT KNOW WHAT WILL HAPPEN BUT IT IS THEIR UNDERSTANDING THAT THEY DO NOT HAVE THE PROPER AMOUNT OF PHARMACIST'S TO BE OPERATING. PLEASE SEND DULOXETINE TO    [facility] [place] - [address] - [phone]  - [phone] FX

## 2025-01-08 NOTE — TELEPHONE ENCOUNTER
Called pt informed him that pharmacy is open,  they do close down for lunch,  if they only have 1 pharmacist working.  He voiced understanding.

## 2025-04-15 ENCOUNTER — OFFICE VISIT (OUTPATIENT)
Dept: INTERNAL MEDICINE | Facility: CLINIC | Age: 60
End: 2025-04-15
Payer: COMMERCIAL

## 2025-04-15 ENCOUNTER — TRANSCRIBE ORDERS (OUTPATIENT)
Dept: GASTROENTEROLOGY | Facility: CLINIC | Age: 60
End: 2025-04-15
Payer: COMMERCIAL

## 2025-04-15 VITALS
HEIGHT: 71 IN | HEART RATE: 84 BPM | SYSTOLIC BLOOD PRESSURE: 145 MMHG | DIASTOLIC BLOOD PRESSURE: 80 MMHG | WEIGHT: 280 LBS | BODY MASS INDEX: 39.2 KG/M2 | TEMPERATURE: 98 F | OXYGEN SATURATION: 93 %

## 2025-04-15 DIAGNOSIS — R73.03 BORDERLINE DIABETES: Primary | ICD-10-CM

## 2025-04-15 DIAGNOSIS — J40 BRONCHITIS: ICD-10-CM

## 2025-04-15 DIAGNOSIS — G47.00 INSOMNIA, UNSPECIFIED TYPE: ICD-10-CM

## 2025-04-15 DIAGNOSIS — Z12.11 COLON CANCER SCREENING: ICD-10-CM

## 2025-04-15 DIAGNOSIS — Z12.5 SCREENING PSA (PROSTATE SPECIFIC ANTIGEN): ICD-10-CM

## 2025-04-15 DIAGNOSIS — I10 ESSENTIAL HYPERTENSION: ICD-10-CM

## 2025-04-15 DIAGNOSIS — F33.42 RECURRENT MAJOR DEPRESSIVE DISORDER, IN FULL REMISSION: ICD-10-CM

## 2025-04-15 DIAGNOSIS — M19.90 ARTHRITIS: ICD-10-CM

## 2025-04-15 RX ORDER — CETIRIZINE HYDROCHLORIDE 10 MG/1
10 TABLET ORAL DAILY
Qty: 30 TABLET | Refills: 11 | Status: SHIPPED | OUTPATIENT
Start: 2025-04-15

## 2025-04-15 RX ORDER — LISINOPRIL 40 MG/1
40 TABLET ORAL DAILY
Qty: 90 TABLET | Refills: 3 | Status: SHIPPED | OUTPATIENT
Start: 2025-04-15

## 2025-04-15 RX ORDER — ALBUTEROL SULFATE 90 UG/1
2 INHALANT RESPIRATORY (INHALATION) EVERY 4 HOURS PRN
Qty: 18 G | Refills: 11 | Status: SHIPPED | OUTPATIENT
Start: 2025-04-15

## 2025-04-15 RX ORDER — QUETIAPINE FUMARATE 100 MG/1
100 TABLET, FILM COATED ORAL NIGHTLY
Qty: 90 TABLET | Refills: 3 | Status: SHIPPED | OUTPATIENT
Start: 2025-04-15

## 2025-04-15 RX ORDER — DULOXETIN HYDROCHLORIDE 60 MG/1
60 CAPSULE, DELAYED RELEASE ORAL DAILY
Qty: 90 CAPSULE | Refills: 3 | Status: SHIPPED | OUTPATIENT
Start: 2025-04-15

## 2025-04-15 RX ORDER — IBUPROFEN 800 MG/1
800 TABLET, FILM COATED ORAL 2 TIMES DAILY
Qty: 180 TABLET | Refills: 3 | Status: SHIPPED | OUTPATIENT
Start: 2025-04-15

## 2025-04-15 NOTE — PROGRESS NOTES
Subjective     Chief Complaint   Patient presents with    Hypertension    Back Pain       Hypertension    Back Pain      Patient's PMR from outside medical facility reviewed and noted.    Raimundo Ramos is a 59 y.o. male who presents for a routine office visit.  Patient presents for follow up of Pre-Diabetes. Current symptoms include: none. Patient denies polydipsia, polyuria, visual disturbances, and vomiting.  Home sugars: patient does not check sugars. Current treatments: no recent interventions. Patient is due for his diabetic labs at this time, so we will go ahead and order Cmp, HBa1c, Lipid Profile, and Microalbumin urine for evaluation.     Patient would also like to get set up with prostate cancer screening and colon cancer screening at this time.    His blood pressure is moderately elevated as he had run out of his lisinopril we will go ahead and update a new prescription at this time.  Patient states that his arthritis and insomnia are doing well with his specific meds for these issues.  States that his depression continues to do well on Cymbalta.  Otherwise reports no other new problems complaints or concerns        Past Medical History:   Past Medical History:   Diagnosis Date    Anxiety     Back pain     Chronic pain     Depression     Elevated cholesterol     Hypertension     Substance abuse      Past Surgical History:  Past Surgical History:   Procedure Laterality Date    WOUND CLOSURE Right 5/28/2019    Procedure: Complex closure of open scalp wound avulsion defect 7x 4.5 cm with rotation/ advancement flap closure 9cm x 5 cm;  Surgeon: Fam Cardoso MD;  Location: [facility] OR;  Service: ENT     Social History:  reports that he has been smoking cigarettes. He has a 19 pack-year smoking history. He has never used smokeless tobacco. He reports that he does not currently use alcohol. He reports that he does not use drugs.    Family History: family history includes Diabetes in his mother;  Hyperlipidemia in his mother; Hypertension in his father and mother; Stroke in his maternal grandfather.      Allergies:  Allergies   Allergen Reactions    Meloxicam Other (See Comments)    Nsaids Other (See Comments)    Oxazepam Other (See Comments)     Medications:  Prior to Admission medications    Medication Sig Start Date End Date Taking? Authorizing Provider   albuterol sulfate  (90 Base) MCG/ACT inhaler Inhale 2 puffs Every 4 (Four) Hours As Needed for Wheezing. 10/10/24  Yes Arcadio Mendes MD   buprenorphine-naloxone (SUBOXONE) 8-2 MG per SL tablet Place 1 tablet under the tongue Daily.   Yes ProviderJanie MD   cetirizine (zyrTEC) 10 MG tablet Take 1 tablet by mouth Daily. 10/14/24  Yes Arcadio Mendes MD   DULoxetine (CYMBALTA) 60 MG capsule Take 1 capsule by mouth Daily. 10/10/24  Yes Arcadio Mendes MD   ibuprofen (ADVIL,MOTRIN) 800 MG tablet Take 1 tablet by mouth 2 (Two) Times a Day. 10/10/24  Yes Arcadio Mendes MD   lisinopril (PRINIVIL,ZESTRIL) 40 MG tablet Take 1 tablet by mouth Daily. 10/10/24  Yes Arcadio Mendes MD   QUEtiapine (SEROquel) 100 MG tablet Take 1 tablet by mouth 2 (Two) Times a Day. 10/10/24  Yes Arcadio Mendes MD       BRI: Over the last two weeks, how often have you been bothered by the following problems?  Feeling nervous, anxious or on edge: Not at all  Not being able to stop or control worrying: Not at all  Worrying too much about different things: Not at all  Trouble Relaxing: Not at all  Being so restless that it is hard to sit still: Not at all  Becoming easily annoyed or irritable: Not at all  Feeling afraid as if something awful might happen: Not at all  BRI 7 Total Score: 0  If you checked any problems, how difficult have these problems made it for you to do your work, take care of things at home, or get along with other people: Not difficult at all    PHQ:  Little interest or pleasure in doing  "things? Not at all   Feeling down, depressed, or hopeless? Not at all   PHQ-2 Total Score 0         0 (Negative screening for depression)  Support given, observe for worsening symptoms        Review of systems   negative unless otherwise specified above in HPI    Objective     Vital Signs: /80   Pulse 84   Temp 98 °F (36.7 °C) (Infrared)   Ht 180.3 cm (71\")   Wt 127 kg (280 lb)   SpO2 93%   BMI 39.05 kg/m²     Physical Exam  Vitals and nursing note reviewed.   Constitutional:       General: He is not in acute distress.     Appearance: Normal appearance.   HENT:      Head: Normocephalic.   Eyes:      Extraocular Movements: Extraocular movements intact.      Pupils: Pupils are equal, round, and reactive to light.   Cardiovascular:      Rate and Rhythm: Normal rate and regular rhythm.      Heart sounds: Normal heart sounds. No murmur heard.  Pulmonary:      Effort: Pulmonary effort is normal. No respiratory distress.      Breath sounds: Normal breath sounds. No rhonchi or rales.   Abdominal:      General: Abdomen is flat. Bowel sounds are normal.      Palpations: Abdomen is soft.   Neurological:      General: No focal deficit present.      Mental Status: He is alert.              Results Reviewed:  Glucose   Date Value Ref Range Status   10/10/2024 97 70 - 99 mg/dL Final   02/18/2020 79 74 - 109 mg/dL Final     BUN   Date Value Ref Range Status   10/10/2024 21 6 - 24 mg/dL Final   02/18/2020 20 6 - 20 mg/dL Final     Creatinine   Date Value Ref Range Status   10/10/2024 0.99 0.76 - 1.27 mg/dL Final   02/18/2020 0.7 0.5 - 1.2 mg/dL Final     Sodium   Date Value Ref Range Status   10/10/2024 137 134 - 144 mmol/L Final   02/18/2020 140 136 - 145 mmol/L Final     Potassium   Date Value Ref Range Status   10/10/2024 4.4 3.5 - 5.2 mmol/L Final   02/18/2020 4.4 3.5 - 5.0 mmol/L Final     Chloride   Date Value Ref Range Status   10/10/2024 99 96 - 106 mmol/L Final   02/18/2020 102 98 - 111 mmol/L Final     CO2 "   Date Value Ref Range Status   02/18/2020 24 22 - 29 mmol/L Final     Total CO2   Date Value Ref Range Status   10/10/2024 26 20 - 29 mmol/L Final     Calcium   Date Value Ref Range Status   10/10/2024 9.0 8.7 - 10.2 mg/dL Final   02/18/2020 9.4 8.6 - 10.0 mg/dL Final     ALT (SGPT)   Date Value Ref Range Status   10/10/2024 10 0 - 44 IU/L Final   02/18/2020 8 5 - 41 U/L Final     AST (SGOT)   Date Value Ref Range Status   10/10/2024 16 0 - 40 IU/L Final   02/18/2020 12 5 - 40 U/L Final     WBC   Date Value Ref Range Status   07/16/2024 8.3 3.4 - 10.8 x10E3/uL Final   02/18/2020 7.5 4.8 - 10.8 K/uL Final     Hematocrit   Date Value Ref Range Status   07/16/2024 42.5 37.5 - 51.0 % Final   02/18/2020 39.5 (L) 42.0 - 52.0 % Final     Platelets   Date Value Ref Range Status   07/16/2024 285 150 - 450 x10E3/uL Final   02/18/2020 281 130 - 400 K/uL Final     Triglycerides   Date Value Ref Range Status   07/16/2024 240 (H) 0 - 149 mg/dL Final   02/18/2020 98 0 - 149 mg/dL Final     HDL Cholesterol   Date Value Ref Range Status   07/16/2024 38 (L) >39 mg/dL Final   02/18/2020 44 (L) 55 - 121 mg/dL Final     Comment:     VALUES>60 MG/DL ARE ASSOCIATED WITH A DECREASED RISK OF  ATHEROSCLEROTIC CARDIOVASCULAR DISEASE     LDL Cholesterol    Date Value Ref Range Status   02/18/2020 77 <100 mg/dL Final     Comment:     <100 MG/DL=OPITIMAL    100-129 MG/DL=DESIRABLE    130-159 MG/DL BORDERLINE=INCREASED RISK OF ATHEROSCLEROTIC  CARDIOVASCULAR DISEASE    > OR = 160 MG/DL=ASSOCIATED WITH AN INCREASE RISK OF  ATHEROSCLEROTIC CARDIOVASCULAR DISEASE     LDL Chol Calc (NIH)   Date Value Ref Range Status   07/16/2024 78 0 - 99 mg/dL Final     Hemoglobin A1C   Date Value Ref Range Status   10/10/2024 5.8 (H) 4.8 - 5.6 % Final     Comment:              Prediabetes: 5.7 - 6.4           Diabetes: >6.4           Glycemic control for adults with diabetes: <7.0               Procedure   Procedures       Assessment / Plan     Assessment/Plan:    Diagnosis Plan   1. Borderline diabetes  Microalbumin / Creatinine Urine Ratio - Urine, Clean Catch    Lipid Panel    Comprehensive Metabolic Panel    Hemoglobin A1c      2. Screening PSA (prostate specific antigen)  PSA Screen      3. Recurrent major depressive disorder, in full remission  DULoxetine (CYMBALTA) 60 MG capsule      4. Essential hypertension  lisinopril (PRINIVIL,ZESTRIL) 40 MG tablet      5. Insomnia, unspecified type  QUEtiapine (SEROquel) 100 MG tablet      6. Bronchitis  albuterol sulfate  (90 Base) MCG/ACT inhaler      7. Arthritis  ibuprofen (ADVIL,MOTRIN) 800 MG tablet      8. Colon cancer screening  Ambulatory Referral For Screening Colonoscopy            No follow-ups on file. unless patient needs to be seen sooner or acute issues arise.      I have discussed the patient results/orders and and plan/recommendation with them at today's visit.      Signed by:    Arcadio Mendes MD Date: 04/15/25

## 2025-04-17 LAB
ALBUMIN SERPL-MCNC: 4.4 G/DL (ref 3.8–4.9)
ALP SERPL-CCNC: 108 IU/L (ref 44–121)
ALT SERPL-CCNC: 14 IU/L (ref 0–44)
AST SERPL-CCNC: 12 IU/L (ref 0–40)
BILIRUB SERPL-MCNC: <0.2 MG/DL (ref 0–1.2)
BUN SERPL-MCNC: 15 MG/DL (ref 6–24)
BUN/CREAT SERPL: 16 (ref 9–20)
CALCIUM SERPL-MCNC: 9.9 MG/DL (ref 8.7–10.2)
CHLORIDE SERPL-SCNC: 100 MMOL/L (ref 96–106)
CHOLEST SERPL-MCNC: 158 MG/DL (ref 100–199)
CO2 SERPL-SCNC: 19 MMOL/L (ref 20–29)
CREAT SERPL-MCNC: 0.96 MG/DL (ref 0.76–1.27)
CREAT UR-MCNC: NORMAL MG/DL
EGFRCR SERPLBLD CKD-EPI 2021: 92 ML/MIN/1.73
GLOBULIN SER CALC-MCNC: 3.5 G/DL (ref 1.5–4.5)
GLUCOSE SERPL-MCNC: 92 MG/DL (ref 70–99)
HBA1C MFR BLD: 6.1 % (ref 4.8–5.6)
HDLC SERPL-MCNC: 54 MG/DL
LDLC SERPL CALC-MCNC: 80 MG/DL (ref 0–99)
MICROALBUMIN UR-MCNC: NORMAL
POTASSIUM SERPL-SCNC: 5 MMOL/L (ref 3.5–5.2)
PROT SERPL-MCNC: 7.9 G/DL (ref 6–8.5)
PSA SERPL-MCNC: 0.1 NG/ML (ref 0–4)
SODIUM SERPL-SCNC: 137 MMOL/L (ref 134–144)
TRIGL SERPL-MCNC: 141 MG/DL (ref 0–149)
VLDLC SERPL CALC-MCNC: 24 MG/DL (ref 5–40)

## 2025-05-27 ENCOUNTER — TELEPHONE (OUTPATIENT)
Dept: INTERNAL MEDICINE | Facility: CLINIC | Age: 60
End: 2025-05-27
Payer: COMMERCIAL

## 2025-05-27 DIAGNOSIS — L08.9 SKIN INFECTION: Primary | ICD-10-CM

## 2025-05-27 RX ORDER — SULFAMETHOXAZOLE AND TRIMETHOPRIM 800; 160 MG/1; MG/1
1 TABLET ORAL 2 TIMES DAILY
Qty: 14 TABLET | Refills: 0 | Status: SHIPPED | OUTPATIENT
Start: 2025-05-27 | End: 2025-06-03

## 2025-05-27 NOTE — TELEPHONE ENCOUNTER
Caller: Raimundo Ramos    Relationship: Self    Best call back number: [phone]     What medication are you requesting: BETO OR LOTION FOR SPOT ON LEG     What are your current symptoms: LEG IS INFECTED AND KEEPS OPENING UP--SIZE OF A QUARTER    How long have you been experiencing symptoms: A WEEK    Have you had these symptoms before:    [x] Yes  [] No    Have you been treated for these symptoms before:   [x] Yes  [] No    If a prescription is needed, what is your preferred pharmacy and phone number: [facility] [place] [address] - [phone]  - [phone] FX     Additional notes: HE DOESN'T HAVE TRANSPORTATION TO COME IN YET BUT WILL MAKE ARRANGEMENTS IN A COUPLE DAYS TO GET HIM IN TO SEE YOU.        PLEASE CALL WHEN SENT TO PHARMACY

## 2025-05-27 NOTE — TELEPHONE ENCOUNTER
Please advise.  Not sure how you would like to proceed, as pt has transportation issues and unable to schedule an appt at this time.

## 2025-06-18 ENCOUNTER — TELEPHONE (OUTPATIENT)
Dept: GASTROENTEROLOGY | Facility: CLINIC | Age: 60
End: 2025-06-18
Payer: COMMERCIAL

## (undated) DEVICE — DRSNG TELFA PAD NONADH STR 1S 3X8IN

## (undated) DEVICE — GAUZE,SPONGE,FLUFF,6"X6.75",STRL,10/TRAY: Brand: MEDLINE

## (undated) DEVICE — 3M™ STERI-STRIP™ REINFORCED ADHESIVE SKIN CLOSURES, R1547, 1/2 IN X 4 IN (12 MM X 100 MM), 6 STRIPS/ENVELOPE: Brand: 3M™ STERI-STRIP™

## (undated) DEVICE — PK TURNOVER RM ADV

## (undated) DEVICE — SUT VIC 4/0 P3 18IN UD VCP494H

## (undated) DEVICE — UTILITY MARKER W/MED LABELS: Brand: MEDLINE

## (undated) DEVICE — PK ENT HD AND NK 30

## (undated) DEVICE — GLV SURG BIOGEL M LTX PF 7 1/2

## (undated) DEVICE — ANTIBACTERIAL UNDYED BRAIDED (POLYGLACTIN 910), SYNTHETIC ABSORBABLE SUTURE: Brand: COATED VICRYL

## (undated) DEVICE — CONTAINER,SPECIMEN,OR STERILE,4OZ: Brand: MEDLINE

## (undated) DEVICE — OCCLUSIVE GAUZE STRIP,3% BISMUTH TRIBROMOPHENATE IN PETROLATUM BLEND: Brand: XEROFORM

## (undated) DEVICE — DISPOSABLE BIPOLAR CABLE 12FT. (3.6M): Brand: KIRWAN